# Patient Record
Sex: MALE | Race: WHITE | Employment: OTHER | URBAN - METROPOLITAN AREA
[De-identification: names, ages, dates, MRNs, and addresses within clinical notes are randomized per-mention and may not be internally consistent; named-entity substitution may affect disease eponyms.]

---

## 2017-01-05 ENCOUNTER — HOSPITAL ENCOUNTER (OUTPATIENT)
Dept: PHARMACY | Age: 82
Discharge: HOME | End: 2017-01-05
Payer: MEDICARE

## 2017-01-05 DIAGNOSIS — I48.91 ATRIAL FIBRILLATION, UNSPECIFIED TYPE (HCC): ICD-10-CM

## 2017-01-05 LAB
INR BLD: 2.1
PROTIME: 25 SECONDS

## 2017-01-05 PROCEDURE — G0463 HOSPITAL OUTPT CLINIC VISIT: HCPCS

## 2017-01-05 PROCEDURE — 85610 PROTHROMBIN TIME: CPT

## 2017-02-16 ENCOUNTER — HOSPITAL ENCOUNTER (OUTPATIENT)
Dept: PHARMACY | Age: 82
Discharge: HOME | End: 2017-02-16
Payer: MEDICARE

## 2017-02-16 DIAGNOSIS — I48.91 ATRIAL FIBRILLATION, UNSPECIFIED TYPE (HCC): ICD-10-CM

## 2017-02-16 LAB
INR BLD: 2.8
PROTIME: 33.3 SECONDS

## 2017-02-16 PROCEDURE — 85610 PROTHROMBIN TIME: CPT

## 2017-02-16 PROCEDURE — G0463 HOSPITAL OUTPT CLINIC VISIT: HCPCS

## 2017-03-02 LAB
INR BLD: 3.2
PROTIME: 38.8 SECONDS

## 2017-03-30 ENCOUNTER — HOSPITAL ENCOUNTER (OUTPATIENT)
Dept: PHARMACY | Age: 82
Discharge: HOME | End: 2017-03-30
Payer: MEDICARE

## 2017-03-30 DIAGNOSIS — I48.91 ATRIAL FIBRILLATION, UNSPECIFIED TYPE (HCC): ICD-10-CM

## 2017-03-30 LAB
INR BLD: 2.6
PROTIME: 31.2 SECONDS

## 2017-03-30 PROCEDURE — G0463 HOSPITAL OUTPT CLINIC VISIT: HCPCS | Performed by: PHARMACIST

## 2017-03-30 PROCEDURE — 85610 PROTHROMBIN TIME: CPT | Performed by: PHARMACIST

## 2017-04-05 RX ORDER — WARFARIN SODIUM 5 MG/1
TABLET ORAL
Qty: 140 TABLET | Refills: 1 | Status: SHIPPED | OUTPATIENT
Start: 2017-04-05 | End: 2017-11-29 | Stop reason: SDUPTHER

## 2017-05-11 ENCOUNTER — HOSPITAL ENCOUNTER (OUTPATIENT)
Dept: PHARMACY | Age: 82
Discharge: HOME | End: 2017-05-11
Payer: MEDICARE

## 2017-05-11 DIAGNOSIS — I48.91 ATRIAL FIBRILLATION, UNSPECIFIED TYPE (HCC): ICD-10-CM

## 2017-05-11 LAB
INR BLD: 3.2
PROTIME: 38.8 SECONDS

## 2017-05-11 PROCEDURE — 85610 PROTHROMBIN TIME: CPT | Performed by: PHARMACIST

## 2017-05-11 PROCEDURE — 99211 OFF/OP EST MAY X REQ PHY/QHP: CPT | Performed by: PHARMACIST

## 2017-06-08 ENCOUNTER — HOSPITAL ENCOUNTER (OUTPATIENT)
Dept: PHARMACY | Age: 82
Discharge: HOME | End: 2017-06-08
Payer: MEDICARE

## 2017-06-08 DIAGNOSIS — I48.91 ATRIAL FIBRILLATION, UNSPECIFIED TYPE (HCC): ICD-10-CM

## 2017-06-08 LAB
INR BLD: 2.2
PROTIME: 26.9 SECONDS

## 2017-06-08 PROCEDURE — 99211 OFF/OP EST MAY X REQ PHY/QHP: CPT | Performed by: PHARMACIST

## 2017-06-08 PROCEDURE — 85610 PROTHROMBIN TIME: CPT | Performed by: PHARMACIST

## 2017-07-13 ENCOUNTER — HOSPITAL ENCOUNTER (OUTPATIENT)
Dept: PHARMACY | Age: 82
Setting detail: THERAPIES SERIES
Discharge: HOME | End: 2017-07-13
Payer: MEDICARE

## 2017-07-13 DIAGNOSIS — I48.91 ATRIAL FIBRILLATION, UNSPECIFIED TYPE (HCC): ICD-10-CM

## 2017-07-13 LAB
INR BLD: 3
PROTIME: 35.9 SECONDS

## 2017-07-13 PROCEDURE — 99211 OFF/OP EST MAY X REQ PHY/QHP: CPT

## 2017-07-13 PROCEDURE — 85610 PROTHROMBIN TIME: CPT

## 2017-08-24 ENCOUNTER — HOSPITAL ENCOUNTER (OUTPATIENT)
Dept: PHARMACY | Age: 82
Setting detail: THERAPIES SERIES
Discharge: HOME | End: 2017-08-24
Payer: MEDICARE

## 2017-08-24 DIAGNOSIS — I48.91 ATRIAL FIBRILLATION, UNSPECIFIED TYPE (HCC): ICD-10-CM

## 2017-08-24 LAB
INR BLD: 3.9
PROTIME: 46.8 SECONDS

## 2017-08-24 PROCEDURE — 85610 PROTHROMBIN TIME: CPT

## 2017-08-24 PROCEDURE — 99211 OFF/OP EST MAY X REQ PHY/QHP: CPT

## 2017-08-24 RX ORDER — PRAVASTATIN SODIUM 10 MG
10 TABLET ORAL DAILY
COMMUNITY

## 2017-09-22 ENCOUNTER — OFFICE VISIT (OUTPATIENT)
Dept: PULMONOLOGY | Age: 82
End: 2017-09-22

## 2017-09-22 VITALS
OXYGEN SATURATION: 96 % | WEIGHT: 192 LBS | HEIGHT: 70 IN | SYSTOLIC BLOOD PRESSURE: 120 MMHG | BODY MASS INDEX: 27.49 KG/M2 | TEMPERATURE: 96.4 F | DIASTOLIC BLOOD PRESSURE: 70 MMHG | HEART RATE: 62 BPM

## 2017-09-22 DIAGNOSIS — G47.33 OSA ON CPAP: ICD-10-CM

## 2017-09-22 DIAGNOSIS — Z99.89 OSA ON CPAP: ICD-10-CM

## 2017-09-22 PROBLEM — I10 HTN (HYPERTENSION): Status: ACTIVE | Noted: 2017-09-22

## 2017-09-22 PROCEDURE — G8419 CALC BMI OUT NRM PARAM NOF/U: HCPCS | Performed by: INTERNAL MEDICINE

## 2017-09-22 PROCEDURE — 1123F ACP DISCUSS/DSCN MKR DOCD: CPT | Performed by: INTERNAL MEDICINE

## 2017-09-22 PROCEDURE — 1036F TOBACCO NON-USER: CPT | Performed by: INTERNAL MEDICINE

## 2017-09-22 PROCEDURE — 99213 OFFICE O/P EST LOW 20 MIN: CPT | Performed by: INTERNAL MEDICINE

## 2017-09-22 PROCEDURE — 4040F PNEUMOC VAC/ADMIN/RCVD: CPT | Performed by: INTERNAL MEDICINE

## 2017-09-22 PROCEDURE — G8427 DOCREV CUR MEDS BY ELIG CLIN: HCPCS | Performed by: INTERNAL MEDICINE

## 2017-09-22 RX ORDER — VERAPAMIL HYDROCHLORIDE 120 MG/1
180 CAPSULE, EXTENDED RELEASE ORAL
COMMUNITY
Start: 2005-09-01

## 2017-09-22 RX ORDER — OLMESARTAN MEDOXOMIL AND HYDROCHLOROTHIAZIDE 20/12.5 20; 12.5 MG/1; MG/1
TABLET ORAL
COMMUNITY
Start: 2005-09-01 | End: 2021-08-31

## 2017-09-22 ASSESSMENT — ENCOUNTER SYMPTOMS
WHEEZING: 0
ABDOMINAL PAIN: 0
NAUSEA: 0
SHORTNESS OF BREATH: 0
VOMITING: 0
DIARRHEA: 0
RHINORRHEA: 0
EYE ITCHING: 0
VOICE CHANGE: 0
COUGH: 0
CHEST TIGHTNESS: 0
SORE THROAT: 0

## 2017-10-05 ENCOUNTER — HOSPITAL ENCOUNTER (OUTPATIENT)
Dept: PHARMACY | Age: 82
Setting detail: THERAPIES SERIES
Discharge: HOME | End: 2017-10-05
Payer: MEDICARE

## 2017-10-05 DIAGNOSIS — I48.91 ATRIAL FIBRILLATION, UNSPECIFIED TYPE (HCC): ICD-10-CM

## 2017-10-05 LAB
INR BLD: 3.4
PROTIME: 41 SECONDS

## 2017-10-05 PROCEDURE — 99211 OFF/OP EST MAY X REQ PHY/QHP: CPT | Performed by: PHARMACIST

## 2017-10-05 PROCEDURE — 85610 PROTHROMBIN TIME: CPT | Performed by: PHARMACIST

## 2017-11-16 ENCOUNTER — HOSPITAL ENCOUNTER (OUTPATIENT)
Dept: PHARMACY | Age: 82
Setting detail: THERAPIES SERIES
Discharge: HOME | End: 2017-11-16
Payer: MEDICARE

## 2017-11-16 DIAGNOSIS — I48.91 ATRIAL FIBRILLATION, UNSPECIFIED TYPE (HCC): ICD-10-CM

## 2017-11-16 LAB
INR BLD: 3.1
PROTIME: 37.4 SECONDS

## 2017-11-16 PROCEDURE — 85610 PROTHROMBIN TIME: CPT

## 2017-11-16 PROCEDURE — 99211 OFF/OP EST MAY X REQ PHY/QHP: CPT

## 2017-11-16 NOTE — PROGRESS NOTES
Mr. Kandace Ventura is a [de-identified] y.o. y/o male with history of Afib who presents today for anticoagulation monitoring and adjustment.   INR 3.1 is supra-therapeutic for this patient (goal range 2-3) and is reflective of 42.5 mg TWD  Patient verifies current dosing regimen, patient able to verbally recall dose  Patient reports 0  missed doses since last INR   Patient denies s/sx clotting and/or stroke  Patient denies hematuria, epistaxis, rectal bleeding  Patient denies changes in diet, alcohol, or tobacco use  Reviewed medication list and drug allergies with patient, updated any medication additions or modifications accordingly  Patient also denies any pending medical or dental procedures scheduled at this time  This was the third result supra-therapeutic (previous readings 3.4, 3.9)  Patient was instructed to decrease (5.9%) to 40 mg  and RTC 5 weeks

## 2017-11-29 RX ORDER — WARFARIN SODIUM 5 MG/1
TABLET ORAL
Qty: 130 TABLET | Refills: 1 | Status: SHIPPED | OUTPATIENT
Start: 2017-11-29 | End: 2018-02-15 | Stop reason: SDUPTHER

## 2017-12-21 ENCOUNTER — HOSPITAL ENCOUNTER (OUTPATIENT)
Dept: PHARMACY | Age: 82
Setting detail: THERAPIES SERIES
Discharge: HOME | End: 2017-12-21
Payer: MEDICARE

## 2017-12-21 DIAGNOSIS — I48.91 ATRIAL FIBRILLATION, UNSPECIFIED TYPE (HCC): ICD-10-CM

## 2017-12-21 LAB
INR BLD: 3.9
PROTIME: 46.9 SECONDS

## 2017-12-21 PROCEDURE — 99211 OFF/OP EST MAY X REQ PHY/QHP: CPT | Performed by: PHARMACIST

## 2017-12-21 PROCEDURE — 85610 PROTHROMBIN TIME: CPT | Performed by: PHARMACIST

## 2017-12-21 RX ORDER — TAMSULOSIN HYDROCHLORIDE 0.4 MG/1
0.4 CAPSULE ORAL DAILY
COMMUNITY

## 2018-01-04 ENCOUNTER — HOSPITAL ENCOUNTER (OUTPATIENT)
Dept: PHARMACY | Age: 83
Setting detail: THERAPIES SERIES
Discharge: HOME | End: 2018-01-04
Payer: MEDICARE

## 2018-01-04 DIAGNOSIS — I48.91 ATRIAL FIBRILLATION, UNSPECIFIED TYPE (HCC): ICD-10-CM

## 2018-01-04 LAB
INR BLD: 2.6
PROTIME: 31 SECONDS

## 2018-01-04 PROCEDURE — 99211 OFF/OP EST MAY X REQ PHY/QHP: CPT

## 2018-01-04 PROCEDURE — 85610 PROTHROMBIN TIME: CPT

## 2018-02-01 ENCOUNTER — HOSPITAL ENCOUNTER (OUTPATIENT)
Dept: PHARMACY | Age: 83
Setting detail: THERAPIES SERIES
Discharge: HOME | End: 2018-02-01
Payer: MEDICARE

## 2018-02-01 DIAGNOSIS — I48.91 ATRIAL FIBRILLATION, UNSPECIFIED TYPE (HCC): ICD-10-CM

## 2018-02-01 LAB
INR BLD: 2.7
PROTIME: 32 SECONDS

## 2018-02-01 PROCEDURE — 85610 PROTHROMBIN TIME: CPT | Performed by: PHARMACIST

## 2018-02-01 PROCEDURE — 99211 OFF/OP EST MAY X REQ PHY/QHP: CPT | Performed by: PHARMACIST

## 2018-02-15 RX ORDER — WARFARIN SODIUM 5 MG/1
TABLET ORAL
Qty: 130 TABLET | Refills: 1 | Status: SHIPPED | OUTPATIENT
Start: 2018-02-15 | End: 2018-08-15 | Stop reason: SDUPTHER

## 2018-03-13 ENCOUNTER — HOSPITAL ENCOUNTER (OUTPATIENT)
Dept: GENERAL RADIOLOGY | Age: 83
Discharge: HOME | End: 2018-03-15
Payer: MEDICARE

## 2018-03-13 DIAGNOSIS — R10.9 ABDOMINAL PAIN, UNSPECIFIED ABDOMINAL LOCATION: ICD-10-CM

## 2018-03-13 PROCEDURE — 74018 RADEX ABDOMEN 1 VIEW: CPT

## 2018-03-15 ENCOUNTER — HOSPITAL ENCOUNTER (OUTPATIENT)
Dept: PHARMACY | Age: 83
Setting detail: THERAPIES SERIES
Discharge: HOME | End: 2018-03-15
Payer: MEDICARE

## 2018-03-15 DIAGNOSIS — I48.91 ATRIAL FIBRILLATION, UNSPECIFIED TYPE (HCC): ICD-10-CM

## 2018-03-15 LAB
INR BLD: 2
PROTIME: 23.9 SECONDS

## 2018-03-15 PROCEDURE — 85610 PROTHROMBIN TIME: CPT

## 2018-03-15 PROCEDURE — 99211 OFF/OP EST MAY X REQ PHY/QHP: CPT

## 2018-03-15 NOTE — PROGRESS NOTES
Mr. Angelia Leyden is a [de-identified] y.o. y/o male with history of Afib who presents today for anticoagulation monitoring and adjustment.   INR 2.0 is therapeutic for this patient (goal range 2-3) and is reflective of 35 mg TWD  Patient verifies current dosing regimen, patient able to verbally recall dose  Patient reports 0 missed doses since last INR   Patient denies s/sx clotting and/or stroke  Patient denies hematuria, epistaxis, rectal bleeding  Patient denies changes in diet, alcohol, or tobacco use  Reviewed medication list and drug allergies with patient, updated any medication additions or modifications accordingly  Patient also denies any pending medical or dental procedures scheduled at this time  Patient was instructed to continue 35 mg TWD and RTC 6 weeks    Rinku Castano PharmD   3/15/2018 8:41 AM

## 2018-03-27 ENCOUNTER — OFFICE VISIT (OUTPATIENT)
Dept: PULMONOLOGY | Age: 83
End: 2018-03-27
Payer: MEDICARE

## 2018-03-27 VITALS
WEIGHT: 191.2 LBS | BODY MASS INDEX: 28.32 KG/M2 | OXYGEN SATURATION: 96 % | HEART RATE: 97 BPM | SYSTOLIC BLOOD PRESSURE: 128 MMHG | HEIGHT: 69 IN | TEMPERATURE: 97.6 F | DIASTOLIC BLOOD PRESSURE: 70 MMHG

## 2018-03-27 DIAGNOSIS — I48.20 CHRONIC ATRIAL FIBRILLATION (HCC): ICD-10-CM

## 2018-03-27 DIAGNOSIS — G47.33 OSA ON CPAP: Primary | ICD-10-CM

## 2018-03-27 DIAGNOSIS — E66.3 OVERWEIGHT (BMI 25.0-29.9): ICD-10-CM

## 2018-03-27 DIAGNOSIS — Z99.89 OSA ON CPAP: Primary | ICD-10-CM

## 2018-03-27 PROCEDURE — 4040F PNEUMOC VAC/ADMIN/RCVD: CPT | Performed by: INTERNAL MEDICINE

## 2018-03-27 PROCEDURE — 99214 OFFICE O/P EST MOD 30 MIN: CPT | Performed by: INTERNAL MEDICINE

## 2018-03-27 PROCEDURE — 1123F ACP DISCUSS/DSCN MKR DOCD: CPT | Performed by: INTERNAL MEDICINE

## 2018-03-27 PROCEDURE — G8419 CALC BMI OUT NRM PARAM NOF/U: HCPCS | Performed by: INTERNAL MEDICINE

## 2018-03-27 PROCEDURE — 1036F TOBACCO NON-USER: CPT | Performed by: INTERNAL MEDICINE

## 2018-03-27 PROCEDURE — G8484 FLU IMMUNIZE NO ADMIN: HCPCS | Performed by: INTERNAL MEDICINE

## 2018-03-27 PROCEDURE — G8427 DOCREV CUR MEDS BY ELIG CLIN: HCPCS | Performed by: INTERNAL MEDICINE

## 2018-03-27 ASSESSMENT — ENCOUNTER SYMPTOMS
ABDOMINAL PAIN: 0
SORE THROAT: 0
DIARRHEA: 0
VOICE CHANGE: 0
EYE ITCHING: 0
SHORTNESS OF BREATH: 0
CHEST TIGHTNESS: 0
NAUSEA: 0
VOMITING: 0
RHINORRHEA: 0
WHEEZING: 0
COUGH: 0

## 2018-03-27 NOTE — PROGRESS NOTES
for chest pain, palpitations and leg swelling. Gastrointestinal: Negative for abdominal pain, diarrhea, nausea and vomiting. Genitourinary: Negative for difficulty urinating and hematuria. Musculoskeletal: Negative for arthralgias, joint swelling and myalgias. Skin: Negative for rash. Allergic/Immunologic: Negative for environmental allergies. Neurological: Negative for dizziness, tremors, weakness and headaches. Psychiatric/Behavioral: Negative for behavioral problems and sleep disturbance. Objective:     Vitals:    03/27/18 0858   BP: 128/70   Site: Left Arm   Pulse: 97   Temp: 97.6 °F (36.4 °C)   TempSrc: Tympanic   SpO2: 96%   Weight: 191 lb 3.2 oz (86.7 kg)   Height: 5' 9\" (1.753 m)         Physical Exam   Constitutional: He is oriented to person, place, and time. He appears well-developed and well-nourished. HENT:   Head: Normocephalic and atraumatic. Nose: Nose normal.   Mouth/Throat: Oropharynx is clear and moist.   Eyes: Conjunctivae and EOM are normal. Pupils are equal, round, and reactive to light. Neck: No JVD present. No tracheal deviation present. No thyromegaly present. Cardiovascular: Normal rate and regular rhythm. Exam reveals no gallop and no friction rub. No murmur heard. Pulmonary/Chest: Effort normal and breath sounds normal. No respiratory distress. He has no wheezes. He has no rales. He exhibits no tenderness. Abdominal: He exhibits no distension. Musculoskeletal: Normal range of motion. Lymphadenopathy:     He has no cervical adenopathy. Neurological: He is alert and oriented to person, place, and time. No cranial nerve deficit. Skin: Skin is warm and dry. No rash noted. Psychiatric: He has a normal mood and affect. His behavior is normal.       Current Outpatient Prescriptions   Medication Sig Dispense Refill    warfarin (COUMADIN) 5 MG tablet Take as directed by [facility] [place] [facility] [place] [facility]. Quantity equals 90 day supply.

## 2018-04-26 ENCOUNTER — HOSPITAL ENCOUNTER (OUTPATIENT)
Dept: PHARMACY | Age: 83
Setting detail: THERAPIES SERIES
Discharge: HOME | End: 2018-04-26
Payer: MEDICARE

## 2018-04-26 DIAGNOSIS — I48.91 ATRIAL FIBRILLATION, UNSPECIFIED TYPE (HCC): ICD-10-CM

## 2018-04-26 LAB
INR BLD: 2.2
PROTIME: 26.2 SECONDS

## 2018-04-26 PROCEDURE — 85610 PROTHROMBIN TIME: CPT

## 2018-04-26 PROCEDURE — 99211 OFF/OP EST MAY X REQ PHY/QHP: CPT

## 2018-06-07 ENCOUNTER — HOSPITAL ENCOUNTER (OUTPATIENT)
Dept: PHARMACY | Age: 83
Setting detail: THERAPIES SERIES
Discharge: HOME | End: 2018-06-07
Payer: MEDICARE

## 2018-06-07 DIAGNOSIS — I48.91 ATRIAL FIBRILLATION, UNSPECIFIED TYPE (HCC): ICD-10-CM

## 2018-06-07 LAB
INR BLD: 2.3
PROTIME: 27.1 SECONDS

## 2018-06-07 PROCEDURE — 85610 PROTHROMBIN TIME: CPT | Performed by: PHARMACIST

## 2018-06-07 PROCEDURE — 99211 OFF/OP EST MAY X REQ PHY/QHP: CPT | Performed by: PHARMACIST

## 2018-07-19 ENCOUNTER — HOSPITAL ENCOUNTER (OUTPATIENT)
Dept: PHARMACY | Age: 83
Setting detail: THERAPIES SERIES
Discharge: HOME | End: 2018-07-19
Payer: MEDICARE

## 2018-07-19 DIAGNOSIS — I48.91 ATRIAL FIBRILLATION, UNSPECIFIED TYPE (HCC): ICD-10-CM

## 2018-07-19 LAB
INR BLD: 2.6
PROTIME: 31 SECONDS

## 2018-07-19 PROCEDURE — 99211 OFF/OP EST MAY X REQ PHY/QHP: CPT

## 2018-07-19 PROCEDURE — 85610 PROTHROMBIN TIME: CPT

## 2018-08-15 RX ORDER — WARFARIN SODIUM 5 MG/1
TABLET ORAL
Qty: 100 TABLET | Refills: 1 | Status: SHIPPED | OUTPATIENT
Start: 2018-08-15 | End: 2019-05-14 | Stop reason: SDUPTHER

## 2018-08-30 ENCOUNTER — HOSPITAL ENCOUNTER (OUTPATIENT)
Dept: PHARMACY | Age: 83
Setting detail: THERAPIES SERIES
Discharge: HOME | End: 2018-08-30
Payer: MEDICARE

## 2018-08-30 DIAGNOSIS — I48.91 ATRIAL FIBRILLATION, UNSPECIFIED TYPE (HCC): ICD-10-CM

## 2018-08-30 LAB
INR BLD: 2.3
PROTIME: 27.8 SECONDS

## 2018-08-30 PROCEDURE — 85610 PROTHROMBIN TIME: CPT

## 2018-08-30 PROCEDURE — 99211 OFF/OP EST MAY X REQ PHY/QHP: CPT

## 2018-08-30 NOTE — PROGRESS NOTES
Mr. Sara Horta is a 82 y.o. y/o male with history of Afib who presents today for anticoagulation monitoring and adjustment.   INR 2.3 is therapeutic for this patient (goal range 2-3) and is reflective of 35 mg TWD  Patient verifies current dosing regimen, patient able to verbally recall dose  Patient reports 0  missed doses since last INR   Patient denies s/sx clotting and/or stroke  Patient denies hematuria, epistaxis, rectal bleeding  Patient denies changes in diet, alcohol, or tobacco use  Reviewed medication list and drug allergies with patient, updated any medication additions or modifications accordingly  Patient also denies any pending medical or dental procedures scheduled at this time  Patient was instructed to resume current regimen of 35 mg TWD and RTC 6 weeks      Sophy Angelo, PharmD  Staff Pharmacist  8/30/2018 8:49 AM

## 2018-09-25 ENCOUNTER — OFFICE VISIT (OUTPATIENT)
Dept: PULMONOLOGY | Age: 83
End: 2018-09-25
Payer: MEDICARE

## 2018-09-25 VITALS
BODY MASS INDEX: 27.63 KG/M2 | OXYGEN SATURATION: 97 % | TEMPERATURE: 98.6 F | HEIGHT: 70 IN | WEIGHT: 193 LBS | SYSTOLIC BLOOD PRESSURE: 120 MMHG | RESPIRATION RATE: 16 BRPM | HEART RATE: 76 BPM | DIASTOLIC BLOOD PRESSURE: 62 MMHG

## 2018-09-25 DIAGNOSIS — Z99.89 OSA ON CPAP: Primary | ICD-10-CM

## 2018-09-25 DIAGNOSIS — I48.91 ATRIAL FIBRILLATION, UNSPECIFIED TYPE (HCC): ICD-10-CM

## 2018-09-25 DIAGNOSIS — G47.33 OSA ON CPAP: Primary | ICD-10-CM

## 2018-09-25 DIAGNOSIS — E66.3 OVERWEIGHT (BMI 25.0-29.9): ICD-10-CM

## 2018-09-25 PROBLEM — H26.492 POSTERIOR CAPSULAR OPACIFICATION VISUALLY SIGNIFICANT OF LEFT EYE: Status: ACTIVE | Noted: 2018-01-10

## 2018-09-25 PROCEDURE — 1123F ACP DISCUSS/DSCN MKR DOCD: CPT | Performed by: INTERNAL MEDICINE

## 2018-09-25 PROCEDURE — 4040F PNEUMOC VAC/ADMIN/RCVD: CPT | Performed by: INTERNAL MEDICINE

## 2018-09-25 PROCEDURE — G8419 CALC BMI OUT NRM PARAM NOF/U: HCPCS | Performed by: INTERNAL MEDICINE

## 2018-09-25 PROCEDURE — 1036F TOBACCO NON-USER: CPT | Performed by: INTERNAL MEDICINE

## 2018-09-25 PROCEDURE — G8427 DOCREV CUR MEDS BY ELIG CLIN: HCPCS | Performed by: INTERNAL MEDICINE

## 2018-09-25 PROCEDURE — 1101F PT FALLS ASSESS-DOCD LE1/YR: CPT | Performed by: INTERNAL MEDICINE

## 2018-09-25 PROCEDURE — 99213 OFFICE O/P EST LOW 20 MIN: CPT | Performed by: INTERNAL MEDICINE

## 2018-09-25 RX ORDER — ASPIRIN 81 MG/1
81 TABLET, CHEWABLE ORAL DAILY
COMMUNITY

## 2018-09-25 ASSESSMENT — ENCOUNTER SYMPTOMS
CHEST TIGHTNESS: 0
VOICE CHANGE: 0
SHORTNESS OF BREATH: 0
COUGH: 0
SORE THROAT: 0
DIARRHEA: 0
ABDOMINAL PAIN: 0
VOMITING: 0
EYE ITCHING: 0
NAUSEA: 0
RHINORRHEA: 0
WHEEZING: 0

## 2018-09-25 NOTE — PROGRESS NOTES
chewable tablet Take 81 mg by mouth daily      warfarin (COUMADIN) 5 MG tablet Take as directed by [facility] EMERGENCY [facility] AT [place] [facility]. Quantity equals 90 day supply. 100 tablet 1    tamsulosin (FLOMAX) 0.4 MG capsule Take 0.4 mg by mouth daily      olmesartan-hydrochlorothiazide (BENICAR HCT) 20-12.5 MG per tablet Take by mouth      verapamil (VERELAN) 120 MG extended release capsule Take by mouth      CPAP Machine MISC by Does not apply route Indications: 7 cm with sleep 6-8 hours      pravastatin (PRAVACHOL) 10 MG tablet Take 10 mg by mouth daily       No current facility-administered medications for this visit. Assessment/Plan:     1. OSVALDO on CPAP  Patient is using CPAP with  7  centimeters of H2O with heated humidity. Patient is using CPAP for about 7  hours every night. Patient is using CPAP with  Dream Wear Nasal Mask. He said mask is moving and leaking. He said he will call [facility] to change mask . CPAP supply is requested. Counseling: CPAP/BiPAP uses, patient advised to use CPAP at least 5-6 hours every night. Driving: patient is advised for extreme caution when driving or operating machinery if there is a feeling of drowsiness, especially while driving it is preferable to stop driving and take a brief nap. Sleep hygiene:Avoid supine sleep, sleep on  sides. Avoid  sleep deprivation. Explained sleep hygiene. Advice to avoid Alcohol and sedative    2. Overweight (BMI 25.0-29. 9)  Patient patient is advised try to lose weight. obesity related risk explained to the patient ,  Current weight:  193 lb (87.5 kg) Lbs. BMI:  Body mass index is 27.69 kg/m². 3. Atrial fibrillation, unspecified type [facility])  Following with dr. Rene Olsen, he is on coumadin therapy. Return in about 6 months (around 3/25/2019) for osvaldo.       Sujata Greene MD

## 2018-10-11 ENCOUNTER — HOSPITAL ENCOUNTER (OUTPATIENT)
Dept: PHARMACY | Age: 83
Setting detail: THERAPIES SERIES
Discharge: HOME | End: 2018-10-11
Payer: MEDICARE

## 2018-10-11 DIAGNOSIS — I48.91 ATRIAL FIBRILLATION, UNSPECIFIED TYPE (HCC): ICD-10-CM

## 2018-10-11 LAB
INR BLD: 3.7
PROTIME: 44.3 SECONDS

## 2018-10-11 PROCEDURE — 85610 PROTHROMBIN TIME: CPT

## 2018-10-11 PROCEDURE — 99211 OFF/OP EST MAY X REQ PHY/QHP: CPT

## 2018-11-01 ENCOUNTER — HOSPITAL ENCOUNTER (OUTPATIENT)
Dept: PHARMACY | Age: 83
Setting detail: THERAPIES SERIES
Discharge: HOME | End: 2018-11-01
Payer: MEDICARE

## 2018-11-01 DIAGNOSIS — I48.91 ATRIAL FIBRILLATION, UNSPECIFIED TYPE (HCC): ICD-10-CM

## 2018-11-01 PROCEDURE — 85610 PROTHROMBIN TIME: CPT

## 2018-11-01 PROCEDURE — 99211 OFF/OP EST MAY X REQ PHY/QHP: CPT

## 2018-11-01 NOTE — PROGRESS NOTES
Mr. Santy Cisneros is a 82 y.o. y/o male with history of Afib who presents today for anticoagulation monitoring and adjustment.   INR 2.8 is therapeutic for this patient (goal range 2-3) and is reflective of 35 mg TWD  Patient verifies current dosing regimen, patient able to verbally recall dose  Patient reports 0  missed doses since last INR   Patient denies s/sx clotting and/or stroke  Patient denies hematuria, epistaxis, rectal bleeding  Patient denies changes in diet, alcohol, or tobacco use  Reviewed medication list and drug allergies with patient, updated any medication additions or modifications accordingly  Patient also denies any pending medical or dental procedures scheduled at this time  Patient was instructed to continue 35 mg TWd and RTC 6 weeks

## 2018-12-13 ENCOUNTER — HOSPITAL ENCOUNTER (OUTPATIENT)
Dept: PHARMACY | Age: 83
Setting detail: THERAPIES SERIES
Discharge: HOME | End: 2018-12-13
Payer: MEDICARE

## 2018-12-13 DIAGNOSIS — I48.91 ATRIAL FIBRILLATION, UNSPECIFIED TYPE (HCC): ICD-10-CM

## 2018-12-13 LAB — INTERNATIONAL NORMALIZATION RATIO, POC: 2.2

## 2018-12-13 PROCEDURE — 99211 OFF/OP EST MAY X REQ PHY/QHP: CPT | Performed by: PHARMACIST

## 2018-12-13 PROCEDURE — 85610 PROTHROMBIN TIME: CPT | Performed by: PHARMACIST

## 2018-12-13 NOTE — PROGRESS NOTES
Mr. Arleen Marr is a 82 y.o. y/o male with history of Afib who presents today for anticoagulation monitoring and adjustment.   INR 2.2 is therapeutic for this patient (goal range 2-3) and is reflective of 35 mg TWD  Patient verifies current dosing regimen, patient able to verbally recall dose  Patient reports no  missed doses since last INR   Patient denies s/sx clotting and/or stroke  Patient denies hematuria, epistaxis, rectal bleeding  Patient denies changes in diet, alcohol, or tobacco use  Reviewed medication list and drug allergies with patient, updated any medication additions or modifications accordingly  Patient also denies any pending medical or dental procedures scheduled at this time  Patient was instructed to continue 35mg TWD and RTC 6 weeks

## 2019-01-24 ENCOUNTER — HOSPITAL ENCOUNTER (OUTPATIENT)
Dept: PHARMACY | Age: 84
Setting detail: THERAPIES SERIES
Discharge: HOME | End: 2019-01-24
Payer: MEDICARE

## 2019-01-24 DIAGNOSIS — I48.91 ATRIAL FIBRILLATION, UNSPECIFIED TYPE (HCC): ICD-10-CM

## 2019-01-24 LAB — INTERNATIONAL NORMALIZATION RATIO, POC: 2.1

## 2019-01-24 PROCEDURE — 99211 OFF/OP EST MAY X REQ PHY/QHP: CPT | Performed by: PHARMACIST

## 2019-01-24 PROCEDURE — 85610 PROTHROMBIN TIME: CPT | Performed by: PHARMACIST

## 2019-03-07 ENCOUNTER — HOSPITAL ENCOUNTER (OUTPATIENT)
Dept: PHARMACY | Age: 84
Setting detail: THERAPIES SERIES
Discharge: HOME | End: 2019-03-07
Payer: MEDICARE

## 2019-03-07 DIAGNOSIS — I48.91 ATRIAL FIBRILLATION, UNSPECIFIED TYPE (HCC): ICD-10-CM

## 2019-03-07 PROCEDURE — 85610 PROTHROMBIN TIME: CPT

## 2019-03-07 PROCEDURE — 99211 OFF/OP EST MAY X REQ PHY/QHP: CPT

## 2019-03-26 ENCOUNTER — OFFICE VISIT (OUTPATIENT)
Dept: PULMONOLOGY | Age: 84
End: 2019-03-26
Payer: MEDICARE

## 2019-03-26 VITALS
TEMPERATURE: 97.6 F | HEART RATE: 75 BPM | RESPIRATION RATE: 16 BRPM | WEIGHT: 190 LBS | HEIGHT: 69 IN | BODY MASS INDEX: 28.14 KG/M2 | DIASTOLIC BLOOD PRESSURE: 64 MMHG | OXYGEN SATURATION: 97 % | SYSTOLIC BLOOD PRESSURE: 138 MMHG

## 2019-03-26 DIAGNOSIS — Z99.89 OSA ON CPAP: Primary | ICD-10-CM

## 2019-03-26 DIAGNOSIS — E66.3 OVERWEIGHT (BMI 25.0-29.9): ICD-10-CM

## 2019-03-26 DIAGNOSIS — G47.33 OSA ON CPAP: Primary | ICD-10-CM

## 2019-03-26 DIAGNOSIS — I48.91 ATRIAL FIBRILLATION, UNSPECIFIED TYPE (HCC): ICD-10-CM

## 2019-03-26 PROCEDURE — G8427 DOCREV CUR MEDS BY ELIG CLIN: HCPCS | Performed by: INTERNAL MEDICINE

## 2019-03-26 PROCEDURE — 1036F TOBACCO NON-USER: CPT | Performed by: INTERNAL MEDICINE

## 2019-03-26 PROCEDURE — 1123F ACP DISCUSS/DSCN MKR DOCD: CPT | Performed by: INTERNAL MEDICINE

## 2019-03-26 PROCEDURE — 4040F PNEUMOC VAC/ADMIN/RCVD: CPT | Performed by: INTERNAL MEDICINE

## 2019-03-26 PROCEDURE — G8484 FLU IMMUNIZE NO ADMIN: HCPCS | Performed by: INTERNAL MEDICINE

## 2019-03-26 PROCEDURE — 99214 OFFICE O/P EST MOD 30 MIN: CPT | Performed by: INTERNAL MEDICINE

## 2019-03-26 PROCEDURE — 1101F PT FALLS ASSESS-DOCD LE1/YR: CPT | Performed by: INTERNAL MEDICINE

## 2019-03-26 PROCEDURE — G8419 CALC BMI OUT NRM PARAM NOF/U: HCPCS | Performed by: INTERNAL MEDICINE

## 2019-03-26 ASSESSMENT — ENCOUNTER SYMPTOMS
COUGH: 0
SORE THROAT: 0
SHORTNESS OF BREATH: 0
CHEST TIGHTNESS: 0
VOMITING: 0
WHEEZING: 0
VOICE CHANGE: 0
DIARRHEA: 0
NAUSEA: 0
RHINORRHEA: 0
EYE ITCHING: 0
ABDOMINAL PAIN: 0

## 2019-04-18 ENCOUNTER — HOSPITAL ENCOUNTER (OUTPATIENT)
Dept: PHARMACY | Age: 84
Setting detail: THERAPIES SERIES
Discharge: HOME | End: 2019-04-18
Payer: MEDICARE

## 2019-04-18 DIAGNOSIS — I48.91 ATRIAL FIBRILLATION, UNSPECIFIED TYPE (HCC): ICD-10-CM

## 2019-04-18 LAB — INTERNATIONAL NORMALIZATION RATIO, POC: 2.8

## 2019-04-18 PROCEDURE — 99211 OFF/OP EST MAY X REQ PHY/QHP: CPT

## 2019-04-18 PROCEDURE — 85610 PROTHROMBIN TIME: CPT

## 2019-04-30 LAB
ANION GAP SERPL CALCULATED.3IONS-SCNC: 11 MMOL/L (ref 10–20)
BICARBONATE: 31 MMOL/L (ref 21–32)
CHLORIDE BLD-SCNC: 104 MMOL/L (ref 98–107)
CHOLESTEROL/HDL RATIO: 2.8
CHOLESTEROL: 128 MG/DL (ref 0–199)
CREAT SERPL-MCNC: 1.01 MG/DL (ref 0.5–1.3)
DIGOXIN LEVEL: 0.9 NG/ML (ref 0.8–2)
GFR AFRICAN AMERICAN: >60 ML/MIN/1.73M2
GFR NON-AFRICAN AMERICAN: >60 ML/MIN/1.73M2
HDLC SERPL-MCNC: 46 MG/DL
LDL CHOLESTEROL: 68 MG/DL (ref 0–99)
POTASSIUM SERPL-SCNC: 3.8 MMOL/L (ref 3.5–5.3)
SODIUM BLD-SCNC: 142 MMOL/L (ref 136–145)
TRIGL SERPL-MCNC: 72 MG/DL (ref 0–149)
UREA NITROGEN: 16 MG/DL (ref 6–23)
VLDLC SERPL CALC-MCNC: 14 MG/DL (ref 0–40)

## 2019-05-14 RX ORDER — WARFARIN SODIUM 5 MG/1
TABLET ORAL
Qty: 100 TABLET | Refills: 1 | Status: SHIPPED | OUTPATIENT
Start: 2019-05-14 | End: 2019-11-20 | Stop reason: SDUPTHER

## 2019-05-30 ENCOUNTER — HOSPITAL ENCOUNTER (OUTPATIENT)
Dept: PHARMACY | Age: 84
Setting detail: THERAPIES SERIES
Discharge: HOME | End: 2019-05-30
Payer: MEDICARE

## 2019-05-30 LAB — INTERNATIONAL NORMALIZATION RATIO, POC: 2

## 2019-05-30 PROCEDURE — 85610 PROTHROMBIN TIME: CPT

## 2019-05-30 PROCEDURE — 99211 OFF/OP EST MAY X REQ PHY/QHP: CPT

## 2019-07-11 ENCOUNTER — HOSPITAL ENCOUNTER (OUTPATIENT)
Dept: PHARMACY | Age: 84
Setting detail: THERAPIES SERIES
Discharge: HOME | End: 2019-07-11
Payer: MEDICARE

## 2019-07-11 DIAGNOSIS — I48.91 ATRIAL FIBRILLATION, UNSPECIFIED TYPE (HCC): ICD-10-CM

## 2019-07-11 LAB — INTERNATIONAL NORMALIZATION RATIO, POC: 2.8

## 2019-07-11 PROCEDURE — 85610 PROTHROMBIN TIME: CPT

## 2019-07-11 PROCEDURE — 99211 OFF/OP EST MAY X REQ PHY/QHP: CPT

## 2019-08-22 ENCOUNTER — HOSPITAL ENCOUNTER (OUTPATIENT)
Dept: PHARMACY | Age: 84
Setting detail: THERAPIES SERIES
Discharge: HOME | End: 2019-08-22
Payer: MEDICARE

## 2019-08-22 DIAGNOSIS — I48.91 ATRIAL FIBRILLATION, UNSPECIFIED TYPE (HCC): ICD-10-CM

## 2019-08-22 LAB — INTERNATIONAL NORMALIZATION RATIO, POC: 2.1

## 2019-08-22 PROCEDURE — 85610 PROTHROMBIN TIME: CPT | Performed by: PHARMACIST

## 2019-08-22 PROCEDURE — 99211 OFF/OP EST MAY X REQ PHY/QHP: CPT | Performed by: PHARMACIST

## 2019-08-22 NOTE — PROGRESS NOTES
Mr. Marco Antonio Machado is a 82 y.o. y/o male with history of Afib who presents today for anticoagulation monitoring and adjustment.   INR 2.1 is therapeutic for this patient (goal range 2-3) and is reflective of 35 mg TWD  Patient verifies current dosing regimen, patient able to verbally recall dose  Patient reports no missed doses since last INR   Patient denies s/sx clotting and/or stroke  Patient denies hematuria, epistaxis, rectal bleeding  Patient denies changes in diet, alcohol, or tobacco use  Reviewed medication list and drug allergies with patient, updated any medication additions or modifications accordingly  Patient also denies any pending medical or dental procedures scheduled at this time  Patient was instructed to continue 35mg TWD and RTC 6 weeks

## 2019-09-27 ENCOUNTER — OFFICE VISIT (OUTPATIENT)
Dept: PULMONOLOGY | Age: 84
End: 2019-09-27
Payer: MEDICARE

## 2019-09-27 VITALS
SYSTOLIC BLOOD PRESSURE: 120 MMHG | DIASTOLIC BLOOD PRESSURE: 60 MMHG | HEIGHT: 69 IN | BODY MASS INDEX: 28.14 KG/M2 | HEART RATE: 75 BPM | WEIGHT: 190 LBS | TEMPERATURE: 97.1 F | RESPIRATION RATE: 16 BRPM | OXYGEN SATURATION: 96 %

## 2019-09-27 DIAGNOSIS — E66.3 OVERWEIGHT (BMI 25.0-29.9): ICD-10-CM

## 2019-09-27 DIAGNOSIS — G47.33 OSA ON CPAP: Primary | ICD-10-CM

## 2019-09-27 DIAGNOSIS — Z99.89 OSA ON CPAP: Primary | ICD-10-CM

## 2019-09-27 PROCEDURE — 1123F ACP DISCUSS/DSCN MKR DOCD: CPT | Performed by: INTERNAL MEDICINE

## 2019-09-27 PROCEDURE — G8419 CALC BMI OUT NRM PARAM NOF/U: HCPCS | Performed by: INTERNAL MEDICINE

## 2019-09-27 PROCEDURE — 4040F PNEUMOC VAC/ADMIN/RCVD: CPT | Performed by: INTERNAL MEDICINE

## 2019-09-27 PROCEDURE — 1036F TOBACCO NON-USER: CPT | Performed by: INTERNAL MEDICINE

## 2019-09-27 PROCEDURE — G8427 DOCREV CUR MEDS BY ELIG CLIN: HCPCS | Performed by: INTERNAL MEDICINE

## 2019-09-27 PROCEDURE — 99213 OFFICE O/P EST LOW 20 MIN: CPT | Performed by: INTERNAL MEDICINE

## 2019-09-27 ASSESSMENT — ENCOUNTER SYMPTOMS
DIARRHEA: 0
WHEEZING: 0
RHINORRHEA: 0
EYE ITCHING: 0
CHEST TIGHTNESS: 0
VOICE CHANGE: 0
SORE THROAT: 0
VOMITING: 0
NAUSEA: 0
ABDOMINAL PAIN: 0
SHORTNESS OF BREATH: 0
COUGH: 0

## 2019-09-27 NOTE — PROGRESS NOTES
club or organization: Not on file     Attends meetings of clubs or organizations: Not on file     Relationship status: Not on file    Intimate partner violence:     Fear of current or ex partner: Not on file     Emotionally abused: Not on file     Physically abused: Not on file     Forced sexual activity: Not on file   Other Topics Concern    Not on file   Social History Narrative    Not on file         Review of Systems   Constitutional: Negative for chills, diaphoresis, fatigue and fever. HENT: Negative for congestion, mouth sores, nosebleeds, postnasal drip, rhinorrhea, sneezing, sore throat and voice change. Eyes: Negative for itching and visual disturbance. Respiratory: Negative for cough, chest tightness, shortness of breath and wheezing. Cardiovascular: Negative. Negative for chest pain, palpitations and leg swelling. Gastrointestinal: Negative for abdominal pain, diarrhea, nausea and vomiting. Genitourinary: Negative for difficulty urinating and hematuria. Musculoskeletal: Negative for arthralgias, joint swelling and myalgias. Skin: Negative for rash. Allergic/Immunologic: Negative for environmental allergies. Neurological: Negative for dizziness, tremors, weakness and headaches. Psychiatric/Behavioral: Positive for sleep disturbance. Negative for behavioral problems. Objective:     Vitals:    09/27/19 0907   BP: 120/60   Pulse: 75   Resp: 16   Temp: 97.1 °F (36.2 °C)   TempSrc: Tympanic   SpO2: 96%   Weight: 190 lb (86.2 kg)   Height: 5' 9\" (1.753 m)         Physical Exam   Constitutional: He is oriented to person, place, and time. He appears well-developed and well-nourished. HENT:   Head: Normocephalic and atraumatic. Nose: Nose normal.   Mouth/Throat: Oropharynx is clear and moist.   Eyes: Pupils are equal, round, and reactive to light. Conjunctivae and EOM are normal.   Neck: No JVD present. No tracheal deviation present. No thyromegaly present.    Cardiovascular:

## 2019-10-03 ENCOUNTER — HOSPITAL ENCOUNTER (OUTPATIENT)
Dept: PHARMACY | Age: 84
Setting detail: THERAPIES SERIES
Discharge: HOME | End: 2019-10-03
Payer: MEDICARE

## 2019-10-03 DIAGNOSIS — I48.91 ATRIAL FIBRILLATION, UNSPECIFIED TYPE (HCC): ICD-10-CM

## 2019-10-03 LAB — INTERNATIONAL NORMALIZATION RATIO, POC: 2

## 2019-10-03 PROCEDURE — 99211 OFF/OP EST MAY X REQ PHY/QHP: CPT

## 2019-10-03 PROCEDURE — 85610 PROTHROMBIN TIME: CPT

## 2019-11-14 ENCOUNTER — HOSPITAL ENCOUNTER (OUTPATIENT)
Dept: PHARMACY | Age: 84
Setting detail: THERAPIES SERIES
Discharge: HOME | End: 2019-11-14
Payer: MEDICARE

## 2019-11-14 DIAGNOSIS — I48.91 ATRIAL FIBRILLATION, UNSPECIFIED TYPE (HCC): ICD-10-CM

## 2019-11-14 LAB — INTERNATIONAL NORMALIZATION RATIO, POC: 1.5

## 2019-11-14 PROCEDURE — 99211 OFF/OP EST MAY X REQ PHY/QHP: CPT | Performed by: PHARMACIST

## 2019-11-14 PROCEDURE — 85610 PROTHROMBIN TIME: CPT | Performed by: PHARMACIST

## 2019-11-20 RX ORDER — WARFARIN SODIUM 5 MG/1
TABLET ORAL
Qty: 100 TABLET | Refills: 1 | Status: SHIPPED | OUTPATIENT
Start: 2019-11-20 | End: 2020-07-08 | Stop reason: SDUPTHER

## 2019-12-04 ENCOUNTER — HOSPITAL ENCOUNTER (OUTPATIENT)
Dept: PHARMACY | Age: 84
Setting detail: THERAPIES SERIES
Discharge: HOME | End: 2019-12-04
Payer: MEDICARE

## 2019-12-04 DIAGNOSIS — I48.91 ATRIAL FIBRILLATION, UNSPECIFIED TYPE (HCC): ICD-10-CM

## 2019-12-04 LAB — INTERNATIONAL NORMALIZATION RATIO, POC: 2.4

## 2019-12-04 PROCEDURE — 85610 PROTHROMBIN TIME: CPT

## 2019-12-04 PROCEDURE — 99211 OFF/OP EST MAY X REQ PHY/QHP: CPT

## 2020-01-16 ENCOUNTER — HOSPITAL ENCOUNTER (OUTPATIENT)
Dept: PHARMACY | Age: 85
Setting detail: THERAPIES SERIES
Discharge: HOME | End: 2020-01-16
Payer: MEDICARE

## 2020-01-16 LAB — INTERNATIONAL NORMALIZATION RATIO, POC: 2.1

## 2020-01-16 PROCEDURE — 99211 OFF/OP EST MAY X REQ PHY/QHP: CPT | Performed by: PHARMACIST

## 2020-01-16 PROCEDURE — 85610 PROTHROMBIN TIME: CPT | Performed by: PHARMACIST

## 2020-02-27 ENCOUNTER — HOSPITAL ENCOUNTER (OUTPATIENT)
Dept: PHARMACY | Age: 85
Setting detail: THERAPIES SERIES
Discharge: HOME | End: 2020-02-27
Payer: MEDICARE

## 2020-02-27 LAB — INTERNATIONAL NORMALIZATION RATIO, POC: 1.9

## 2020-02-27 PROCEDURE — 85610 PROTHROMBIN TIME: CPT | Performed by: PHARMACIST

## 2020-02-27 PROCEDURE — 99211 OFF/OP EST MAY X REQ PHY/QHP: CPT | Performed by: PHARMACIST

## 2020-04-10 ENCOUNTER — TELEPHONE (OUTPATIENT)
Dept: PHARMACY | Age: 85
End: 2020-04-10

## 2020-05-21 ENCOUNTER — HOSPITAL ENCOUNTER (OUTPATIENT)
Dept: PHARMACY | Age: 85
Setting detail: THERAPIES SERIES
Discharge: HOME | End: 2020-05-21
Payer: MEDICARE

## 2020-05-21 DIAGNOSIS — I48.91 ATRIAL FIBRILLATION, UNSPECIFIED TYPE (HCC): ICD-10-CM

## 2020-05-21 LAB
ANION GAP SERPL CALCULATED.3IONS-SCNC: 12 MEQ/L (ref 9–15)
CHLORIDE BLD-SCNC: 101 MEQ/L (ref 95–107)
CHOLESTEROL, TOTAL: 126 MG/DL (ref 0–199)
CO2: 26 MEQ/L (ref 20–31)
DIGOXIN LEVEL: 0.8 NG/ML (ref 0.8–2)
HDLC SERPL-MCNC: 63 MG/DL (ref 40–59)
INR BLD: 2
LDL CHOLESTEROL CALCULATED: 50 MG/DL (ref 0–129)
MAGNESIUM: 2.4 MG/DL (ref 1.7–2.4)
POTASSIUM SERPL-SCNC: 3.8 MEQ/L (ref 3.4–4.9)
PROTHROMBIN TIME: 22.9 SEC (ref 12.3–14.9)
SODIUM BLD-SCNC: 139 MEQ/L (ref 135–144)
TOTAL CK: 126 U/L (ref 0–190)
TRIGL SERPL-MCNC: 66 MG/DL (ref 0–150)

## 2020-05-21 PROCEDURE — 99211 OFF/OP EST MAY X REQ PHY/QHP: CPT

## 2020-05-21 NOTE — PROGRESS NOTES
Mr. Tayla Robin is a 82 y.o. y/o male with history of Afib who was called today following lab venipuncture for anticoagulation monitoring and adjustment.   INR 2.0 is therapeutic for this patient (goal range 2-3) and is reflective of 35 mg TWD  Patient verifies current dosing regimen, patient able to verbally recall dose  Patient reports 0  missed doses since last INR   Patient denies s/sx clotting and/or stroke  Patient denies hematuria, epistaxis, rectal bleeding  Patient denies changes in diet, alcohol, or tobacco use  Reviewed medication list and drug allergies with patient, updated any medication additions or modifications accordingly  Patient also denies any pending medical or dental procedures scheduled at this time  Patient was instructed to continue 35 mg TWD and RTC or lab venipuncture 6 weeks  Standing order placed for INR venipuncture lab draw if needed during COVID-19 protocol      CLINICAL PHARMACY CONSULT: MED RECONCILIATION/REVIEW [address]: No  Total # of Interventions Recommended: 1    - Maintenance Safety Lab Monitoring #: 1    Total Interventions Accepted: 1  Time Spent (min): [address]  [facility]

## 2020-05-22 LAB
BUN BLDV-MCNC: 19 MG/DL (ref 8–23)
CREAT SERPL-MCNC: 1.05 MG/DL (ref 0.7–1.2)
GFR AFRICAN AMERICAN: >60
GFR NON-AFRICAN AMERICAN: >60

## 2020-07-02 ENCOUNTER — HOSPITAL ENCOUNTER (OUTPATIENT)
Dept: PHARMACY | Age: 85
Setting detail: THERAPIES SERIES
Discharge: HOME | End: 2020-07-02
Payer: MEDICARE

## 2020-07-02 VITALS — TEMPERATURE: 98.6 F

## 2020-07-02 LAB — INTERNATIONAL NORMALIZATION RATIO, POC: 2

## 2020-07-02 PROCEDURE — 99211 OFF/OP EST MAY X REQ PHY/QHP: CPT

## 2020-07-02 PROCEDURE — 85610 PROTHROMBIN TIME: CPT

## 2020-07-02 NOTE — PROGRESS NOTES
Mr. Maricruz Verma is a 82 y.o. y/o male with history of Afib who presents today for anticoagulation monitoring and adjustment. INR 2.0 is therapeutic for this patient (goal range 2-3) and is reflective of 35 mg TWD  Patient verifies current dosing regimen, patient able to verbally recall dose  Patient reports 0  missed doses since last INR   Patient denies s/sx clotting and/or stroke  Patient denies hematuria, epistaxis, rectal bleeding  Patient denies changes in diet, alcohol, or tobacco use  Reviewed medication list and drug allergies with patient, updated any medication additions or modifications accordingly  Patient also denies any pending medical or dental procedures scheduled at this time  Patient was instructed to continue 35 mg TWD and RTC 6 weeks      CLINICAL PHARMACY CONSULT: MED RECONCILIATION/REVIEW ADDENDUM    For Pharmacy Admin Tracking Only    PHSO: No  Total # of Interventions Recommended: 0    - Maintenance Safety Lab Monitoring #: 1  Recommended intervention potential cost savings:      Total Interventions Accepted: 0  Time Spent (min): 15

## 2020-07-08 RX ORDER — WARFARIN SODIUM 5 MG/1
TABLET ORAL
Qty: 100 TABLET | Refills: 1 | Status: SHIPPED | OUTPATIENT
Start: 2020-07-08 | End: 2021-01-19 | Stop reason: SDUPTHER

## 2020-08-13 ENCOUNTER — HOSPITAL ENCOUNTER (OUTPATIENT)
Dept: PHARMACY | Age: 85
Setting detail: THERAPIES SERIES
Discharge: HOME | End: 2020-08-13
Payer: MEDICARE

## 2020-08-13 VITALS — TEMPERATURE: 98 F

## 2020-08-13 LAB — INTERNATIONAL NORMALIZATION RATIO, POC: 2.3

## 2020-08-13 PROCEDURE — 85610 PROTHROMBIN TIME: CPT | Performed by: PHARMACIST

## 2020-08-13 PROCEDURE — 99211 OFF/OP EST MAY X REQ PHY/QHP: CPT | Performed by: PHARMACIST

## 2020-09-24 ENCOUNTER — HOSPITAL ENCOUNTER (OUTPATIENT)
Dept: PHARMACY | Age: 85
Setting detail: THERAPIES SERIES
Discharge: HOME | End: 2020-09-24
Payer: MEDICARE

## 2020-09-24 VITALS — TEMPERATURE: 97.5 F

## 2020-09-24 LAB — INTERNATIONAL NORMALIZATION RATIO, POC: 2.5

## 2020-09-24 PROCEDURE — 85610 PROTHROMBIN TIME: CPT | Performed by: PHARMACIST

## 2020-09-24 PROCEDURE — 99211 OFF/OP EST MAY X REQ PHY/QHP: CPT | Performed by: PHARMACIST

## 2020-09-28 ENCOUNTER — OFFICE VISIT (OUTPATIENT)
Dept: PULMONOLOGY | Age: 85
End: 2020-09-28
Payer: MEDICARE

## 2020-09-28 VITALS
HEIGHT: 70 IN | HEART RATE: 75 BPM | DIASTOLIC BLOOD PRESSURE: 58 MMHG | TEMPERATURE: 98.1 F | SYSTOLIC BLOOD PRESSURE: 120 MMHG | WEIGHT: 192 LBS | OXYGEN SATURATION: 97 % | BODY MASS INDEX: 27.49 KG/M2 | RESPIRATION RATE: 16 BRPM

## 2020-09-28 PROCEDURE — G8417 CALC BMI ABV UP PARAM F/U: HCPCS | Performed by: INTERNAL MEDICINE

## 2020-09-28 PROCEDURE — 99214 OFFICE O/P EST MOD 30 MIN: CPT | Performed by: INTERNAL MEDICINE

## 2020-09-28 PROCEDURE — 1036F TOBACCO NON-USER: CPT | Performed by: INTERNAL MEDICINE

## 2020-09-28 PROCEDURE — 1123F ACP DISCUSS/DSCN MKR DOCD: CPT | Performed by: INTERNAL MEDICINE

## 2020-09-28 PROCEDURE — G8427 DOCREV CUR MEDS BY ELIG CLIN: HCPCS | Performed by: INTERNAL MEDICINE

## 2020-09-28 PROCEDURE — 4040F PNEUMOC VAC/ADMIN/RCVD: CPT | Performed by: INTERNAL MEDICINE

## 2020-09-28 RX ORDER — AMLODIPINE BESYLATE 5 MG/1
TABLET ORAL
COMMUNITY
Start: 2020-09-09

## 2020-09-28 ASSESSMENT — ENCOUNTER SYMPTOMS
WHEEZING: 0
SORE THROAT: 0
VOMITING: 0
NAUSEA: 0
SHORTNESS OF BREATH: 0
EYE ITCHING: 0
ABDOMINAL PAIN: 0
COUGH: 0
DIARRHEA: 0
RHINORRHEA: 0
CHEST TIGHTNESS: 0
VOICE CHANGE: 0

## 2020-09-28 NOTE — PROGRESS NOTES
Subjective:     Margoth Malik is a 82 y.o. male who complains today of:     Chief Complaint   Patient presents with    Follow-up     one year f/u OSVALDO. HPI  He is using CPAP with 7 centimeters of H2O with heated humidity. He is using CPAP for about  7-8 hours every night. He is using CPAP with Nasal Mask. DME is [employer], he received supply 1 month ago   He said  sleep is restful with the CPAP use. He is compliant with CPAP therapy and benefiting with CPAP use. No snoring with CPAP use. No complaint of daytime sleepiness or tiredness with CPAP use. He denies taking naps. No sleepiness with driving. He denies difficulty falling asleep or staying asleep.       Allergies:  Pcn [penicillins] and Sulfa antibiotics  Past Medical History:   Diagnosis Date    Atrial fibrillation (HCC)     HTN (hypertension)     OSVALDO on CPAP      Past Surgical History:   Procedure Laterality Date    CATARACT REMOVAL  2011     Family History   Problem Relation Age of Onset    Heart Disease Father      Social History     Socioeconomic History    Marital status:      Spouse name: Not on file    Number of children: Not on file    Years of education: Not on file    Highest education level: Not on file   Occupational History    Not on file   Social Needs    Financial resource strain: Not on file    Food insecurity     Worry: Not on file     Inability: Not on file    Transportation needs     Medical: Not on file     Non-medical: Not on file   Tobacco Use    Smoking status: Never Smoker    Smokeless tobacco: Never Used   Substance and Sexual Activity    Alcohol use: Not on file    Drug use: Not on file    Sexual activity: Not on file   Lifestyle    Physical activity     Days per week: Not on file     Minutes per session: Not on file    Stress: Not on file   Relationships    Social connections     Talks on phone: Not on file     Gets together: Not on file     Attends Confucianism service: Not on file Standard TWO VW    Narrative EXAMINATION CHEST TWO VIEWS     CLINICAL HISTORY Shortness of breath     COMPARISONS 5/8/09     FINDINGS Heart, mediastinum, and hilum are unremarkable. Dense  calcified granuloma of the left lower lobe is again noted. Lungs are  clear. Pulmonary vasculature is normal.  No effusions are seen. Bones  are intact. IMPRESSION NO ACTIVE CHEST DISEASE. Poly Shekhar English M.D. Released Jody English M.D. Released Date Time- 11/07/14 5102   This document has been electronically signed. ------------------------------------------------------------------------------       Assessment/Plan:     1. OSVALDO on CPAP  He is using CPAP with 7 centimeters of H2O with heated humidity. He is using CPAP for about  7-8 hours every night. He is using CPAP with Nasal Mask. DME is [facility], he received supply 1 month ago . He said  sleep is restful with the CPAP use. He is compliant with CPAP therapy and benefiting with CPAP use. No snoring with CPAP use. Continue CPAP as before. Counseling: CPAP/BiPAP uses, patient advised to use CPAP at least 5-6 hours every night. Driving: patient is advised for extreme caution when driving or operating machinery if there is a feeling of drowsiness, especially while driving it is preferable to stop driving and take a brief nap. Sleep hygiene:Avoid supine sleep, sleep on  sides. Avoid  sleep deprivation. Explained sleep hygiene. Advice to avoid Alcohol and sedative    Time spend over 25 min. Face to face. with greater than 50 % time with counseling regarding CPAP therapy. 2. Overweight (BMI 25.0-29. 9)  Patient patient is advised try to lose weight. obesity related risk explained to the patient ,  Current weight:  192 lb (87.1 kg) Lbs. BMI:  Body mass index is 27.55 kg/m². Suggested weight control approaches, including dietary changes , exercise, behavioral modification.         Return in

## 2020-11-05 ENCOUNTER — HOSPITAL ENCOUNTER (OUTPATIENT)
Dept: PHARMACY | Age: 85
Setting detail: THERAPIES SERIES
Discharge: HOME | End: 2020-11-05
Payer: MEDICARE

## 2020-11-05 VITALS — TEMPERATURE: 97.5 F

## 2020-11-05 LAB — INTERNATIONAL NORMALIZATION RATIO, POC: 1.8

## 2020-11-05 PROCEDURE — 85610 PROTHROMBIN TIME: CPT

## 2020-11-05 PROCEDURE — 99211 OFF/OP EST MAY X REQ PHY/QHP: CPT

## 2020-11-05 NOTE — PROGRESS NOTES
Mr. Kelly Allen is a 82 y.o. y/o male with history of Afib who presents today for anticoagulation monitoring and adjustment.   Today's INR at 1.8 is SUBtherapeutic for this patient (goal range 2 - 3) and is reflective of 35 mg TWD  Patient verifies current dosing regimen, patient able to verbally recall dose  Patient reports ZERO  missed doses since last INR   Patient denies s/sx clotting and/or stroke  Patient denies hematuria, epistaxis, rectal bleeding  Patient denies changes in diet, alcohol, or tobacco use  Reviewed medication list and drug allergies with patient, updated any medication additions or modifications accordingly  Patient also denies any pending medical or dental procedures scheduled at this time    Patient was instructed to boost today's dose (5 mg -> 7.5 mg) then resume 35 mg TWD and RTC in 2 weeks    CLINICAL PHARMACY CONSULT: MED RECONCILIATION/REVIEW [address]: No  Total # of Interventions Recommended: 2  - Increased Dose #: 1  - Maintenance Safety Lab Monitoring #: 1  Total Interventions Accepted: 2  Time Spent (min): [address], [facility]

## 2020-11-19 ENCOUNTER — HOSPITAL ENCOUNTER (OUTPATIENT)
Dept: PHARMACY | Age: 85
Setting detail: THERAPIES SERIES
Discharge: HOME | End: 2020-11-19
Payer: MEDICARE

## 2020-11-19 VITALS — TEMPERATURE: 97.7 F

## 2020-11-19 LAB — INTERNATIONAL NORMALIZATION RATIO, POC: 2.5

## 2020-11-19 PROCEDURE — 99211 OFF/OP EST MAY X REQ PHY/QHP: CPT | Performed by: PHARMACIST

## 2020-11-19 PROCEDURE — 85610 PROTHROMBIN TIME: CPT | Performed by: PHARMACIST

## 2020-11-19 NOTE — PROGRESS NOTES
Mr. Adelso Hernandez is a [address] y.o. y/o male with history of Afib who presents today for anticoagulation monitoring and adjustment. INR 2.5 is therapeutic for this patient (goal range 2.0-3.0) and is reflective of 35 mg TWD  Patient verifies current dosing regimen, patient able to verbally recall dose  Patient reports 0  missed doses since last INR   Patient denies s/sx clotting and/or stroke  Patient denies hematuria, epistaxis, rectal bleeding  Patient denies changes in diet, alcohol, or tobacco use  Reviewed medication list and drug allergies with patient, updated any medication additions or modifications accordingly  Patient also denies any pending medical or dental procedures scheduled at this time  Patient was instructed to continue current regimen and RTC 6 weeks    Unable to identify why the INR was low at the last visit. CLINICAL PHARMACY CONSULT: MED RECONCILIATION/REVIEW ADDENDUM    For Pharmacy Admin Tracking Only    PHSO: No  Total # of Interventions Recommended: 1  - Maintenance Safety Lab Monitoring #: 1  Total Interventions Accepted: 1  Time Spent (min): 15    CHATO Goodrich Ph. 11/19/2020 8:59 AM

## 2020-12-31 ENCOUNTER — HOSPITAL ENCOUNTER (OUTPATIENT)
Dept: PHARMACY | Age: 85
Setting detail: THERAPIES SERIES
Discharge: HOME | End: 2020-12-31
Payer: MEDICARE

## 2020-12-31 DIAGNOSIS — I48.91 ATRIAL FIBRILLATION, UNSPECIFIED TYPE (HCC): ICD-10-CM

## 2020-12-31 LAB — INTERNATIONAL NORMALIZATION RATIO, POC: 1.9

## 2020-12-31 PROCEDURE — 99211 OFF/OP EST MAY X REQ PHY/QHP: CPT | Performed by: PHARMACIST

## 2020-12-31 PROCEDURE — 85610 PROTHROMBIN TIME: CPT | Performed by: PHARMACIST

## 2020-12-31 NOTE — PROGRESS NOTES
Mr. Flory Mandel is a 82 y.o. y/o male with history of Afib who presents today for anticoagulation monitoring and adjustment.   INR 1.9 is slightly sub-therapeutic for this patient (goal range 2-3) and is reflective of 35 mg TWD  Patient verifies current dosing regimen, patient able to verbally recall dose  Patient reports NO  missed doses since last INR   Patient denies s/sx clotting and/or stroke  Patient denies hematuria, epistaxis, rectal bleeding  Patient denies changes in diet, alcohol, or tobacco use  Watch amounts of Vitamin K [place]  Reviewed medication list and drug allergies with patient, updated any medication additions or modifications accordingly  Patient also denies any pending medical or dental procedures scheduled at this time  Patient was instructed to continue 35 mg TWD and RTC 6 weeks    CLINICAL PHARMACY CONSULT: MED RECONCILIATION/REVIEW [address]: No  Total # of Interventions Recommended: 1  - Increased Dose #: 0  - Maintenance Safety Lab Monitoring #: 1    Total Interventions Accepted: 1  Time Spent (min): [address], [place] [address]

## 2021-01-19 RX ORDER — WARFARIN SODIUM 5 MG/1
TABLET ORAL
Qty: 100 TABLET | Refills: 1 | Status: SHIPPED | OUTPATIENT
Start: 2021-01-19 | End: 2021-08-04 | Stop reason: SDUPTHER

## 2021-02-11 ENCOUNTER — HOSPITAL ENCOUNTER (OUTPATIENT)
Dept: PHARMACY | Age: 86
Setting detail: THERAPIES SERIES
Discharge: HOME | End: 2021-02-11
Payer: MEDICARE

## 2021-02-11 VITALS — TEMPERATURE: 96.7 F

## 2021-02-11 DIAGNOSIS — I48.91 ATRIAL FIBRILLATION, UNSPECIFIED TYPE (HCC): ICD-10-CM

## 2021-02-11 LAB — INTERNATIONAL NORMALIZATION RATIO, POC: 3.4

## 2021-02-11 PROCEDURE — 99211 OFF/OP EST MAY X REQ PHY/QHP: CPT

## 2021-02-11 PROCEDURE — 85610 PROTHROMBIN TIME: CPT

## 2021-02-11 NOTE — PROGRESS NOTES
Mr. Shazia Pryor is a 82 y.o. y/o male with history of Afib who presents today for anticoagulation monitoring and adjustment.   INR 3.4 is supra therapeutic for this patient (goal range 2-3) and is reflective of 35 mg TWD  Patient verifies current dosing regimen, patient able to verbally recall dose  Patient reports 0  missed doses since last INR   Patient denies s/sx clotting and/or stroke  Patient denies hematuria, epistaxis, rectal bleeding  Patient denies changes in diet, alcohol, or tobacco use  Reviewed medication list and drug allergies with patient, updated any medication additions or modifications accordingly  Patient also denies any pending medical or dental procedures scheduled at this time  Patient was instructed to hold warfarin today, then resume 35 mg TWD and RTC 4 weeks (usual 6 weeks)    CLINICAL PHARMACY CONSULT: MED RECONCILIATION/REVIEW [address]: No  Total # of Interventions Recommended: 2  - Decreased Dose #: 1  - Maintenance Safety Lab Monitoring #: 1    Total Interventions Accepted: 2  Time Spent (min): [address] [place], [facility]

## 2021-02-18 ENCOUNTER — HOSPITAL ENCOUNTER (OUTPATIENT)
Dept: WOMENS IMAGING | Age: 86
Discharge: HOME | End: 2021-02-20
Payer: MEDICARE

## 2021-02-18 ENCOUNTER — HOSPITAL ENCOUNTER (OUTPATIENT)
Dept: ULTRASOUND IMAGING | Age: 86
Discharge: HOME | End: 2021-02-20
Payer: MEDICARE

## 2021-02-18 DIAGNOSIS — N64.4 BREAST TENDERNESS IN MALE: ICD-10-CM

## 2021-02-18 DIAGNOSIS — N64.4 BREAST TENDERNESS: ICD-10-CM

## 2021-02-18 PROCEDURE — G0279 TOMOSYNTHESIS, MAMMO: HCPCS

## 2021-02-18 PROCEDURE — 76642 ULTRASOUND BREAST LIMITED: CPT

## 2021-03-11 ENCOUNTER — HOSPITAL ENCOUNTER (OUTPATIENT)
Dept: PHARMACY | Age: 86
Setting detail: THERAPIES SERIES
Discharge: HOME | End: 2021-03-11
Payer: MEDICARE

## 2021-03-11 VITALS — TEMPERATURE: 97.3 F

## 2021-03-11 DIAGNOSIS — I48.91 ATRIAL FIBRILLATION, UNSPECIFIED TYPE (HCC): ICD-10-CM

## 2021-03-11 LAB — INTERNATIONAL NORMALIZATION RATIO, POC: 2

## 2021-03-11 PROCEDURE — 99211 OFF/OP EST MAY X REQ PHY/QHP: CPT | Performed by: PHARMACIST

## 2021-03-11 PROCEDURE — 85610 PROTHROMBIN TIME: CPT | Performed by: PHARMACIST

## 2021-03-11 NOTE — PROGRESS NOTES
Mr. Gerardo Hodgkin is a 82 y.o. y/o male with history of Afib who presents today for anticoagulation monitoring and adjustment.   INR 2.0 is therapeutic for this patient (goal range 2-3) and is reflective of 35 mg TWD  Patient verifies current dosing regimen, patient able to verbally recall dose  Patient reports NO missed doses since last INR   Patient denies s/sx clotting and/or stroke  Patient denies hematuria, epistaxis, rectal bleeding  Patient denies changes in diet, alcohol, or tobacco use  Reviewed medication list and drug allergies with patient, updated any medication additions or modifications accordingly  Patient also denies any pending medical or dental procedures scheduled at this time  Patient was instructed to continue 35 mg TWD and RTC 6 weeks    CLINICAL PHARMACY CONSULT: MED RECONCILIATION/REVIEW [address]: No  Total # of Interventions Recommended: 1  - Increased Dose #: 0  - Maintenance Safety Lab Monitoring #: 1    Total Interventions Accepted: 1  Time Spent (min): [address], [place] [address]

## 2021-04-22 ENCOUNTER — HOSPITAL ENCOUNTER (OUTPATIENT)
Dept: PHARMACY | Age: 86
Setting detail: THERAPIES SERIES
Discharge: HOME | End: 2021-04-22
Payer: MEDICARE

## 2021-04-22 DIAGNOSIS — I48.91 ATRIAL FIBRILLATION, UNSPECIFIED TYPE (HCC): ICD-10-CM

## 2021-04-22 LAB — INTERNATIONAL NORMALIZATION RATIO, POC: 2.3

## 2021-04-22 PROCEDURE — 85610 PROTHROMBIN TIME: CPT | Performed by: PHARMACIST

## 2021-04-22 PROCEDURE — 99211 OFF/OP EST MAY X REQ PHY/QHP: CPT | Performed by: PHARMACIST

## 2021-04-22 NOTE — PROGRESS NOTES
Mr. Carlie Gaviria is a 82 y.o. y/o male with history of Afib who presents today for anticoagulation monitoring and adjustment. INR 2.3 is therapeutic for this patient (goal range 2-3) and is reflective of 35 mg TWD  Patient verifies current dosing regimen, patient able to verbally recall dose  Patient reports NO  missed doses since last INR   Patient denies s/sx clotting and/or stroke  Patient denies hematuria, epistaxis, rectal bleeding  Patient denies changes in diet, alcohol, or tobacco use  Reviewed medication list and drug allergies with patient, updated any medication additions or modifications accordingly  Patient also denies any pending medical or dental procedures scheduled at this time  Patient was instructed to continue 35 mg TWD and RTC 6 weeks    For Pharmacy Admin Tracking Only     Intervention Detail: Adherence Monitorin   Total # of Interventions Recommended: 1   Total # of Interventions Accepted: 1   Time Spent (min): [address] Ph.  2021  8:32 AM

## 2021-05-25 LAB
ANION GAP SERPL CALCULATED.3IONS-SCNC: 15 MEQ/L (ref 9–15)
BUN BLDV-MCNC: 20 MG/DL (ref 8–23)
CHLORIDE BLD-SCNC: 104 MEQ/L (ref 95–107)
CHOLESTEROL, TOTAL: 153 MG/DL (ref 0–199)
CO2: 25 MEQ/L (ref 20–31)
CREAT SERPL-MCNC: 0.98 MG/DL (ref 0.7–1.2)
DIGOXIN LEVEL: 0.8 NG/ML (ref 0.8–2)
GFR AFRICAN AMERICAN: >60
GFR NON-AFRICAN AMERICAN: >60
HDLC SERPL-MCNC: 55 MG/DL (ref 40–59)
LDL CHOLESTEROL CALCULATED: 80 MG/DL (ref 0–129)
MAGNESIUM: 2.3 MG/DL (ref 1.7–2.4)
POTASSIUM SERPL-SCNC: 3.8 MEQ/L (ref 3.4–4.9)
SODIUM BLD-SCNC: 144 MEQ/L (ref 135–144)
TOTAL CK: 97 U/L (ref 0–190)
TRIGL SERPL-MCNC: 91 MG/DL (ref 0–150)

## 2021-06-03 ENCOUNTER — HOSPITAL ENCOUNTER (OUTPATIENT)
Dept: PHARMACY | Age: 86
Setting detail: THERAPIES SERIES
Discharge: HOME | End: 2021-06-03
Payer: MEDICARE

## 2021-06-03 DIAGNOSIS — I48.91 ATRIAL FIBRILLATION, UNSPECIFIED TYPE (HCC): Primary | ICD-10-CM

## 2021-06-03 LAB — INTERNATIONAL NORMALIZATION RATIO, POC: 2.2

## 2021-06-03 PROCEDURE — 99211 OFF/OP EST MAY X REQ PHY/QHP: CPT

## 2021-06-03 PROCEDURE — 85610 PROTHROMBIN TIME: CPT

## 2021-06-03 NOTE — PROGRESS NOTES
Mr. Angelo Mak is a 82 y.o. y/o male with history of Afib who presents today for anticoagulation monitoring and adjustment.   INR 2.2 is therapeutic for this patient (goal range 2-3) and is reflective of 35 mg TWD  Patient verifies current dosing regimen, patient able to verbally recall dose  Patient reports no  missed doses since last INR   Patient denies s/sx clotting and/or stroke  Patient denies hematuria, epistaxis, rectal bleeding  Patient denies changes in diet, alcohol, or tobacco use  Reviewed medication list and drug allergies with patient, updated any medication additions or modifications accordingly  Patient also denies any pending medical or dental procedures scheduled at this time  Patient was instructed to continue current regimen and RTC 6 weeks    For Pharmacy Admin Tracking Only     Intervention Detail: Lab(s) Ordered   Total # of Interventions Recommended: 1   Total # of Interventions Accepted: 1   Time Spent (min): 30    Nancy Reich, PharmD, [address] [facility]  Staff Pharmacist  6/3/2021 8:59 AM

## 2021-07-15 ENCOUNTER — HOSPITAL ENCOUNTER (OUTPATIENT)
Dept: PHARMACY | Age: 86
Setting detail: THERAPIES SERIES
Discharge: HOME | End: 2021-07-15
Payer: MEDICARE

## 2021-07-15 DIAGNOSIS — I48.91 ATRIAL FIBRILLATION, UNSPECIFIED TYPE (HCC): Primary | ICD-10-CM

## 2021-07-15 LAB — INTERNATIONAL NORMALIZATION RATIO, POC: 2

## 2021-07-15 PROCEDURE — 99211 OFF/OP EST MAY X REQ PHY/QHP: CPT

## 2021-07-15 PROCEDURE — 85610 PROTHROMBIN TIME: CPT

## 2021-07-15 NOTE — PROGRESS NOTES
Mr. Martha Puga is a 82 y.o. y/o male with history of Afib who presents today for anticoagulation monitoring and adjustment.   INR 2.0 is therapeutic for this patient (goal range 2-3) and is reflective of 35 mg TWD  Patient verifies current dosing regimen, patient able to verbally recall dose  Patient reports 0  missed doses since last INR   Patient denies s/sx clotting and/or stroke  Patient denies hematuria, epistaxis, rectal bleeding  Patient denies changes in diet, alcohol, or tobacco use  Reviewed medication list and drug allergies with patient, updated any medication additions or modifications accordingly  Patient also denies any pending medical or dental procedures scheduled at this time  Patient was instructed to continue 35 mg TWD and RTC 6 weeks    For Pharmacy Admin Tracking Only     Intervention Detail: Adherence Monitorin   Total # of Interventions Recommended: 1   Total # of Interventions Accepted: 1   Time Spent (min): 15

## 2021-08-04 RX ORDER — WARFARIN SODIUM 5 MG/1
TABLET ORAL
Qty: 100 TABLET | Refills: 1 | Status: SHIPPED | OUTPATIENT
Start: 2021-08-04 | End: 2022-04-11 | Stop reason: SDUPTHER

## 2021-08-26 ENCOUNTER — HOSPITAL ENCOUNTER (OUTPATIENT)
Dept: PHARMACY | Age: 86
Setting detail: THERAPIES SERIES
Discharge: HOME | End: 2021-08-26
Payer: MEDICARE

## 2021-08-26 DIAGNOSIS — I48.91 ATRIAL FIBRILLATION, UNSPECIFIED TYPE (HCC): Primary | ICD-10-CM

## 2021-08-26 LAB — INTERNATIONAL NORMALIZATION RATIO, POC: 2.1

## 2021-08-26 PROCEDURE — 99211 OFF/OP EST MAY X REQ PHY/QHP: CPT

## 2021-08-26 PROCEDURE — 85610 PROTHROMBIN TIME: CPT

## 2021-08-26 NOTE — PROGRESS NOTES
Mr. Keyla Morton is a 82 y.o. y/o male with history of Afib who presents today for anticoagulation monitoring and adjustment.   INR 2.1 is therapeutic for this patient (goal range 2-3) and is reflective of 35 mg TWD  Patient verifies current dosing regimen, patient able to verbally recall dose  Patient reports 0  missed doses since last INR   Patient denies s/sx clotting and/or stroke  Patient denies hematuria, epistaxis, rectal bleeding  Patient denies changes in diet, alcohol, or tobacco use  Reviewed medication list and drug allergies with patient, updated any medication additions or modifications accordingly  Patient also denies any pending medical or dental procedures scheduled at this time  Patient was instructed to continue current TWD of 35 mg and RTC 6 weeks            For Pharmacy Admin Tracking Only     Intervention Detail: Adherence Monitorin   Total # of Interventions Recommended: 1   Total # of Interventions Accepted: 1   Time Spent (min): 20

## 2021-08-31 ENCOUNTER — OFFICE VISIT (OUTPATIENT)
Dept: FAMILY MEDICINE CLINIC | Age: 86
End: 2021-08-31
Payer: MEDICARE

## 2021-08-31 VITALS
HEIGHT: 70 IN | TEMPERATURE: 97.5 F | OXYGEN SATURATION: 97 % | WEIGHT: 192 LBS | BODY MASS INDEX: 27.49 KG/M2 | HEART RATE: 71 BPM | DIASTOLIC BLOOD PRESSURE: 60 MMHG | SYSTOLIC BLOOD PRESSURE: 118 MMHG

## 2021-08-31 DIAGNOSIS — H65.191 ACUTE OTITIS MEDIA WITH EFFUSION OF RIGHT EAR: Primary | ICD-10-CM

## 2021-08-31 PROCEDURE — 99213 OFFICE O/P EST LOW 20 MIN: CPT | Performed by: NURSE PRACTITIONER

## 2021-08-31 RX ORDER — CEFUROXIME AXETIL 250 MG/1
250 TABLET ORAL 2 TIMES DAILY
Qty: 14 TABLET | Refills: 0 | Status: SHIPPED | OUTPATIENT
Start: 2021-08-31 | End: 2021-09-07

## 2021-08-31 RX ORDER — LOSARTAN POTASSIUM AND HYDROCHLOROTHIAZIDE 12.5; 5 MG/1; MG/1
TABLET ORAL
COMMUNITY
Start: 2020-09-09

## 2021-08-31 SDOH — ECONOMIC STABILITY: FOOD INSECURITY: WITHIN THE PAST 12 MONTHS, THE FOOD YOU BOUGHT JUST DIDN'T LAST AND YOU DIDN'T HAVE MONEY TO GET MORE.: NEVER TRUE

## 2021-08-31 SDOH — ECONOMIC STABILITY: FOOD INSECURITY: WITHIN THE PAST 12 MONTHS, YOU WORRIED THAT YOUR FOOD WOULD RUN OUT BEFORE YOU GOT MONEY TO BUY MORE.: NEVER TRUE

## 2021-08-31 ASSESSMENT — SOCIAL DETERMINANTS OF HEALTH (SDOH): HOW HARD IS IT FOR YOU TO PAY FOR THE VERY BASICS LIKE FOOD, HOUSING, MEDICAL CARE, AND HEATING?: NOT HARD AT ALL

## 2021-08-31 ASSESSMENT — ENCOUNTER SYMPTOMS
RHINORRHEA: 0
COUGH: 0
SHORTNESS OF BREATH: 0
SORE THROAT: 0

## 2021-08-31 ASSESSMENT — PATIENT HEALTH QUESTIONNAIRE - PHQ9
SUM OF ALL RESPONSES TO PHQ QUESTIONS 1-9: 0
2. FEELING DOWN, DEPRESSED OR HOPELESS: 0
SUM OF ALL RESPONSES TO PHQ QUESTIONS 1-9: 0
SUM OF ALL RESPONSES TO PHQ9 QUESTIONS 1 & 2: 0
SUM OF ALL RESPONSES TO PHQ QUESTIONS 1-9: 0
1. LITTLE INTEREST OR PLEASURE IN DOING THINGS: 0

## 2021-08-31 NOTE — PROGRESS NOTES
[address] Encounter    CHIEF COMPLAINT:   Soni Blackwell (: 1935) is a 82 y.o. male who presents today for:    Chief Complaint   Patient presents with    Otalgia     Pt states that ear pain started about 1 week ago pt states that he has been trying a hot compress but its not really working      ASSESSMENT/PLAN  1. Acute otitis media with effusion of right ear  Comments:  fullness + intermittent pain x 1wk, improved today. wait-and-see antibiotic prescription given w/ instructions to begin if not continuing to improve in next 48h  Orders:  -     cefUROXime (CEFTIN) 250 MG tablet; Take 1 tablet by mouth 2 times daily for 7 days, Disp-14 tablet, R-0Normal    -      See orders for this visit as documented in the electronic medical record. - Continue daily saline nasal spray, allergen avoidance where possible. - Discussed antibiotics can affect coagulation times- advised to notify PCP/[facility] if he is taking abx    Return for follow-up in 2 to 4 days if not improving, otherwise as needed. SUBJECTIVE/OBJECTIVE:    Otalgia   There is pain in the right ear. This is a new problem. Episode onset: 4d ago. The problem has been waxing and waning (improved overall at the time of this visit). There has been no fever. The pain is moderate. Associated symptoms include hearing loss (not new). Pertinent negatives include no abdominal pain, coughing, ear discharge, headaches, neck pain, rash, rhinorrhea, sore throat or vomiting. He has tried NSAIDs (+ warm compress to external ear, daily saline nasal spray) for the symptoms. The treatment provided mild relief. His past medical history is significant for hearing loss. Relevant PMH:  bilateral sensorineural hearing loss (), tinnitus (), a-fib on warfarin. Previously followed with [facility] (Dr. Chevy Carbone).       Previous Medications    AMLODIPINE (NORVASC) 5 MG TABLET    TAKE 1 TABLET BY MOUTH TWICE DAILY FOR Acute Care - Occupational Therapy Initial Evaluation  [facility]     Patient Name: Stacia Adkins  : 1943  MRN: [id number]  Today's Date: 2018  Onset of Illness/Injury or Date of Surgery: 18  Date of Referral to OT: 18  Referring Physician: MD Anthony    Admit Date: 2018       ICD-10-CM ICD-9-CM   1. Shortness of breath R06.02 786.05   2. Stress response F43.0 308.9   3. Traumatic rhabdomyolysis, initial encounter (CMS/[facility]) T79.6XXA 958.6   4. Fall in home, initial encounter W19.XXXA E888.9    Y92.009 E849.0   5. Impaired functional mobility, balance, gait, and endurance Z74.09 V49.89   6. Impaired mobility and ADLs Z74.09 799.89     Patient Active Problem List   Diagnosis   • Rhabdomyolysis   • Rheumatoid arthritis involving multiple sites (CMS/[facility])   • Diastolic CHF, chronic (CMS/[facility])   • H/O aortic valve replacement     Past Medical History:   Diagnosis Date   • Arthritis, rheumatoid (CMS/HCC)    • Back pain    • CHF (congestive heart failure) (CMS/[facility])      Past Surgical History:   Procedure Laterality Date   • BACK SURGERY     • CARDIAC VALVE SURGERY     • SHOULDER SURGERY            OT ASSESSMENT FLOWSHEET (last 72 hours)      Occupational Therapy Evaluation     Row Name 18 1358                   OT Evaluation Time/Intention    Subjective Information  complains of;weakness;fatigue;pain  -KF        Document Type  evaluation;therapy note (daily note)  -KF        Mode of Treatment  individual therapy;occupational therapy  -KF        Patient Effort  excellent  -KF        Comment  RN aware and notified of pain R shoulder, Pt stated felt better post mobility   -KF           General Information    Patient Profile Reviewed?  yes  -KF        Onset of Illness/Injury or Date of Surgery  18  -KF        Referring Physician  MD Anthony  -KF        Patient Observations  alert;cooperative;agree to therapy  -KF        Patient/Family Observations  no family present, exit alarm  -KF         General Observations of Patient  tele, RA, exit alarm pre and post, RN cleared, Pt agreeable, alert   -KF        Prior Level of Function  independent:;all household mobility;community mobility;gait;transfer;ADL's;home management;using stairs  -KF        Equipment Currently Used at Home  none  -KF        Pertinent History of Current Functional Problem  76 yo female hx of RA, R shoulder and back pain, CHF presents to ED via EMS due to SOA weakness with recent fall at home in which head was struck down for hours presented to PCP which determine abnormal labs indicating rhabdo, later became weak   -KF        Existing Precautions/Restrictions  fall  -KF        Risks Reviewed  patient:;LOB;nausea/vomiting;dizziness;increased discomfort  -KF        Benefits Reviewed  patient:;improve function;increase independence;increase strength;increase balance;decrease pain;increase knowledge  -KF        Barriers to Rehab  none identified  -KF           Relationship/Environment    Primary Source of Support/Comfort  child(erik)  -KF        Lives With  alone  -KF        Family Caregiver if Needed  none  -KF        Primary Roles/Responsibilities  caregiver for other(s);wage earner, part time  -KF        Concerns About Impact on Relationships  Pt states cares for others, very active, children live out of town, part time caregiver at [employer]  -KF           Resource/Environmental Concerns    Current Living Arrangements  home/apartment/condo  -KF           Home Main Entrance    Number of Stairs, Main Entrance  three  -KF        Stair Railings, Main Entrance  railings on both sides of stairs  -KF           Cognitive Assessment/Interventions    Additional Documentation  Cognitive Assessment/Intervention (Group)  -KF           Cognitive Assessment/Intervention- PT/OT    Affect/Mental Status (Cognitive)  WFL  -KF        Orientation Status (Cognition)  oriented x 4  -KF        Follows Commands (Cognition)  WFL;follows one step  90 DAYS    ASPIRIN 81 MG CHEWABLE TABLET    Take 81 mg by mouth daily    COVID-19 MRNA VACC, PFIZER, 30 MCG/0.3ML SUSP INJECTION    Inject into the muscle once Last dose March    CPAP MACHINE MISC    by Does not apply route Indications: 7 cm with sleep 6-8 hours    CPAP MACHINE MISC    by NOT APPLICABLE route    DIGOXIN PO    Take 1 tablet by mouth    LOSARTAN-HYDROCHLOROTHIAZIDE (HYZAAR) 50-12.5 MG PER TABLET    TAKE 1 TABLET BY MOUTH ONCE DAILY FOR 90 DAYS    MAGNESIUM PO    Take by mouth    PRAVASTATIN (PRAVACHOL) 10 MG TABLET    Take 10 mg by mouth daily    RESPIRATORY THERAPY SUPPLIES MASSIEL    New CPAP mask and supplies    TAMSULOSIN (FLOMAX) 0.4 MG CAPSULE    Take 0.4 mg by mouth daily    VERAPAMIL (VERELAN) 120 MG EXTENDED RELEASE CAPSULE    Take by mouth    WARFARIN (COUMADIN) 5 MG TABLET    Take as directed by [facility]. Quantity for 90 day supply. Allergies   Allergen Reactions    Pcn [Penicillins] Swelling    Sulfa Antibiotics Swelling      Review of Systems   Constitutional: Negative for chills, fever and unexpected weight change. HENT: Positive for congestion, ear pain, hearing loss (not new), sneezing and tinnitus (baseline). Negative for ear discharge, mouth sores, rhinorrhea and sore throat. Eyes: Negative for pain and redness. Respiratory: Negative for cough and shortness of breath. Cardiovascular: Negative for chest pain. Gastrointestinal: Negative for abdominal pain, nausea and vomiting. Musculoskeletal: Negative for myalgias and neck pain. Skin: Negative for rash. Neurological: Negative for dizziness, weakness, light-headedness and headaches. Hematological: Bruises/bleeds easily (warfarin).      Vitals:  Vitals:    08/31/21 1151   BP: 118/60   Site: Right Upper Arm   Position: Sitting   Cuff Size: Medium Adult   Pulse: 71   Temp: 97.5 °F (36.4 °C)   TempSrc: Temporal   SpO2: 97%   Weight: 192 lb (87.1 kg)   Height: 5' 10\" (1.778 m) Physical Exam  Vitals and nursing note reviewed. Constitutional:       General: He is not in acute distress. Appearance: He is well-groomed. He is not toxic-appearing. HENT:      Head: Normocephalic and atraumatic. Right Ear: External ear normal. Tenderness present. A middle ear effusion (air-fluid level visible behind TM; serous fluid, non-purulent.) is present. No mastoid tenderness. Tympanic membrane is erythematous (dull). Tympanic membrane is not bulging. Left Ear: Tympanic membrane, ear canal and external ear normal. No tenderness. No mastoid tenderness. Nose: Mucosal edema and congestion present. No nasal tenderness. Mouth/Throat:      Lips: Pink. No lesions. Mouth: Mucous membranes are moist. No oral lesions. Pharynx: Oropharynx is clear. Uvula midline. No oropharyngeal exudate or posterior oropharyngeal erythema. Eyes:      General: Lids are normal.      Extraocular Movements: Extraocular movements intact. Conjunctiva/sclera: Conjunctivae normal.      Pupils: Pupils are equal, round, and reactive to light. Neck:      Trachea: Trachea and phonation normal.   Cardiovascular:      Rate and Rhythm: Normal rate and regular rhythm. Heart sounds: Normal heart sounds. No murmur heard. No friction rub. No gallop. Pulmonary:      Effort: Pulmonary effort is normal. No tachypnea. Breath sounds: Normal breath sounds and air entry. Musculoskeletal:         General: Normal range of motion. Cervical back: Normal range of motion and neck supple. Lymphadenopathy:      Cervical: No cervical adenopathy. Skin:     General: Skin is warm and dry. Capillary Refill: Capillary refill takes less than 2 seconds. Findings: No rash. Neurological:      General: No focal deficit present. Mental Status: He is alert. Mental status is at baseline. Gait: Gait is intact.    Psychiatric:         Mood and Affect: Mood and affect normal. commands;over 90% accuracy  -KF        Cognitive Function (Cognitive)  safety deficit  -KF        Safety Deficit (Cognitive)  mild deficit  -KF        Cognitive Interventions (Cognitive)  occupation/activity based interventions;process/task specific training  -KF        Personal Safety Interventions  fall prevention program maintained;gait belt;muscle strengthening facilitated;nonskid shoes/slippers when out of bed  -KF           Safety Issues, Functional Mobility    Safety Issues Affecting Function (Mobility)  safety precaution awareness;insight into deficits/self awareness  -KF        Impairments Affecting Function (Mobility)  balance;strength;pain  -KF        Comment, Safety Issues/Impairments (Mobility)  pain in R shoulder did not place weight down, held neck in flexion at times because of pain   -KF           Bed Mobility Assessment/Treatment    Comment (Bed Mobility)  UIC  -KF           Functional Mobility    Functional Mobility- Ind. Level  contact guard assist;verbal cues required  -KF        Functional Mobility- Device  other (see comments) gait belt HHA  -KF        Functional Mobility-Distance (Feet)  120  -KF        Functional Mobility- Safety Issues  step length decreased  -KF           Transfer Assessment/Treatment    Transfer Assessment/Treatment  sit-stand transfer;stand-sit transfer  -KF        Comment (Transfers)  forward flexion into standing, no assist required, SBA for balance  -KF           Sit-Stand Transfer    Sit-Stand [place] (Transfers)  verbal cues;stand by assist  -KF           Stand-Sit Transfer    Stand-Sit [place] (Transfers)  supervision  -KF           ADL Assessment/Intervention    BADL Assessment/Intervention  lower body dressing;grooming  -KF           Lower Body Dressing Assessment/Training    Lower Body Dressing [place] Level  doff;don;shoes/slippers;socks;independent  -KF        Lower Body Dressing Position  unsupported sitting  -KF        Comment (Lower Body  Dressing)  able to doff/don shoes and socks as well as tie and untie cross LE technique no LOB and good safety  -KF           Grooming Assessment/Training    [place] Level (Grooming)  oral care regimen;wash face, hands;hair care, combing/brushing;supervision;verbal cues  -KF        Grooming Position  sink side;unsupported standing  -KF        Comment (Grooming)  supervision for safe placement of lines and safety awareness   -KF           BADL Safety/Performance    Impairments, BADL Safety/Performance  balance;strength;pain  -KF        Skilled BADL Treatment/Intervention  BADL process/adaptation training;cognitive/safety deficit modifications  -KF        Progress in BADL Status  improvement noted  -KF           General ROM    GENERAL ROM COMMENTS  R UE limited shoulder flexion due to pain less than 90 degrees, L UE AROM WFL  -KF           MMT (Manual Muscle Testing)    General MMT Comments  R UE MMT deferred due to pain; LUE 5/5  -KF           Motor Assessment/Interventions    Additional Documentation  Balance (Group);Balance Interventions (Group);Fine Motor Testing & Training (Group)  -KF           Balance    Balance  static sitting balance;static standing balance;dynamic sitting balance;dynamic standing balance  -KF           Static Sitting Balance    Level of [place] (Unsupported Sitting, Static Balance)  independent  -KF        Sitting Position (Unsupported Sitting, Static Balance)  sitting in chair  -KF        Time Able to Maintain Position (Unsupported Sitting, Static Balance)  more than 5 minutes  -KF           Dynamic Sitting Balance    Level of [place], Reaches Outside Midline (Sitting, Dynamic Balance)  supervision  -KF        Sitting Position, Reaches Outside Midline (Sitting, Dynamic Balance)  sitting in chair  -KF        Comment, Reaches Outside Midline (Sitting, Dynamic Balance)  no LOB able to cross LEs for LB dressing  -KF           Static Standing Balance    Level of [place]  Speech: Speech normal.       Antibiotic Instructions: Complete the full course of antibiotics as ordered. Take each dose with a small snack or meal to lessen potential GI upset. To prevent antibiotic resistance, please take medication as ordered and for the full duration even if you start to feel better. Consider intake of yogurt or probiotic during antibiotic use and for a few days after to help reduce the risk of developing a secondary infection. Separate the yogurt and antibiotic by at least 1 hour. Avoid alcohol while taking antibiotics. Notify PCP or anticoagulation prescriber when taking antibiotics as they could affect clotting times. Side effects and adverse effects of any medication prescribed today, as well as treatment plan/rationale, follow-up care, and result expectations have been discussed with the patient. Sandra Chauhan expresses understanding and wishes to proceed with the plan. Discussed signs and symptoms which require immediate follow-up in ED/call to 911. Understanding verbalized. I have reviewed and updated the electronic medical record.     Electronically signed by JAMES Mack CNP on 8/31/2021 at 12:44 PM (Supported Standing, Static Balance)  standby assist  -KF        Time Able to Maintain Position (Supported Standing, Static Balance)  3 to 4 minutes  -KF           Dynamic Standing Balance    Level of [place], Reaches Outside Midline (Standing, Dynamic Balance)  contact guard assist  -KF        Time Able to Maintain Position, Reaches Outside Midline (Standing, Dynamic Balance)  3 to 4 minutes  -KF           Fine Motor Testing & Training    Training Activity, Fine Motor Coordination  manipulation of grooming tools  -KF        Comment, Fine Motor Coordination  fine motor WFL   -KF           Sensory Assessment/Intervention    Sensory General Assessment  no sensation deficits identified  -KF           Positioning and Restraints    Pre-Treatment Position  sitting in chair/recliner  -KF        Post Treatment Position  chair  -KF        In Chair  notified nsg;reclined;sitting;call light within reach;encouraged to call for assist;exit alarm on;waffle cushion;legs elevated;with other staff  -KF           Pain Assessment    Additional Documentation  Pain Scale: Numbers Pre/Post-Treatment (Group)  -KF           Pain Scale: Numbers Pre/Post-Treatment    Pain Scale: Numbers, Pretreatment  8/10  -KF        Pain Scale: Numbers, Post-Treatment  8/10  -KF        Pain Location - Side  Bilateral  -KF        Pain Location - Orientation  posterior  -KF        Pain Location  neck  -KF        Pain Intervention(s)  Ambulation/increased activity;Repositioned  -KF           Plan of Care Review    Plan of Care Reviewed With  patient  -KF           Clinical Impression (OT)    Date of Referral to OT  12/13/18  -KF        OT Diagnosis  ADL decline  -KF        Patient/Family Goals Statement (OT Eval)  PLOF  -KF        Criteria for Skilled Therapeutic Interventions Met (OT Eval)  yes;treatment indicated  -KF        Rehab Potential (OT Eval)  good, to achieve stated therapy goals  -KF        Therapy Frequency (OT Eval)  daily  -KF        Care  Plan Review (OT)  evaluation/treatment results reviewed;care plan/treatment goals reviewed;risks/benefits reviewed;current/potential barriers reviewed;patient/other agree to care plan  -KF        Anticipated Equipment Needs at Discharge (OT)  -- TBD  -KF        Anticipated Discharge Disposition (OT)  home with home health  -KF           Vital Signs    Pre Systolic BP Rehab  119  -KF        Pre Treatment Diastolic BP  72 RN cleared , vitals stable no dizziness  -KF        Pretreatment Heart Rate (beats/min)  77  -KF        Pre SpO2 (%)  99  -KF        O2 Delivery Pre Treatment  room air  -KF        Intra SpO2 (%)  96  -KF        O2 Delivery Intra Treatment  room air  -KF        Post SpO2 (%)  98  -KF        O2 Delivery Post Treatment  room air  -KF        Pre Patient Position  Sitting  -KF        Intra Patient Position  Standing  -KF        Post Patient Position  Sitting  -KF           Planned OT Interventions    Planned Therapy Interventions (OT Eval)  activity tolerance training;adaptive equipment training;BADL retraining;cognitive/visual perception retraining;functional balance retraining;neuromuscular control/coordination retraining;occupation/activity based interventions;patient/caregiver education/training;ROM/therapeutic exercise;strengthening exercise;transfer/mobility retraining  -KF           OT Goals    Bed Mobility Goal Selection (OT)  bed mobility, OT goal 1  -KF        Transfer Goal Selection (OT)  transfer, OT goal 1  -KF        Dressing Goal Selection (OT)  --  -KF        Toileting Goal Selection (OT)  toileting, OT goal 1  -KF           Bed Mobility Goal 1 (OT)    Activity/Assistive Device (Bed Mobility Goal 1, OT)  sit to supine/supine to sit  -KF        [place] Level/Cues Needed (Bed Mobility Goal 1, OT)  conditional independence  -KF        Time Frame (Bed Mobility Goal 1, OT)  long term goal (LTG);by discharge  -KF        Progress/Outcomes (Bed Mobility Goal 1, OT)  goal ongoing  -KF            Transfer Goal 1 (OT)    Activity/Assistive Device (Transfer Goal 1, OT)  commode, bedside without drop arms;sit-to-stand/stand-to-sit  -KF        [place] Level/Cues Needed (Transfer Goal 1, OT)  conditional independence  -KF        Time Frame (Transfer Goal 1, OT)  long term goal (LTG);by discharge  -KF        Progress/Outcome (Transfer Goal 1, OT)  goal ongoing  -KF           Toileting Goal 1 (OT)    Activity/Device (Toileting Goal 1, OT)  adjust/manage clothing;perform perineal hygiene;commode, bedside without drop arms  -KF        [place] Level/Cues Needed (Toileting Goal 1, OT)  conditional independence  -KF        Time Frame (Toileting Goal 1, OT)  long term goal (LTG);by discharge  -KF        Progress/Outcome (Toileting Goal 1, OT)  goal ongoing  -KF           Living Environment    Home Accessibility  stairs to enter home  -KF          User Key  (r) = Recorded By, (t) = Taken By, (c) = Cosigned By    Initials Name Effective Dates    Khloe Diehl, OT 04/03/18 -          Occupational Therapy Education     Title: PT OT SLP Therapies (In Progress)     Topic: Occupational Therapy (Done)     Point: ADL training (Done)     Description: Instruct learner(s) on proper safety adaptation and remediation techniques during self care or transfers.   Instruct in proper use of assistive devices.    Learning Progress Summary           Patient Acceptance, E, SHIVA,DU by EDUARDA at 12/14/2018  1:58 PM    Comment:  Educated on purpose of skilled OT services, safety awareness in room and use of IV pole, safety with lines                   Point: Home exercise program (Done)     Description: Instruct learner(s) on appropriate technique for monitoring, assisting and/or progressing therapeutic exercises/activities.    Learning Progress Summary           Patient Acceptance, E, VU,DU by EDUARDA at 12/14/2018  1:58 PM    Comment:  Educated on purpose of skilled OT services, safety awareness in room and use of IV pole, safety  with lines                   Point: Precautions (Done)     Description: Instruct learner(s) on prescribed precautions during self-care and functional transfers.    Learning Progress Summary           Patient Acceptance, E, VU,DU by KF at 12/14/2018  1:58 PM    Comment:  Educated on purpose of skilled OT services, safety awareness in room and use of IV pole, safety with lines                   Point: Body mechanics (Done)     Description: Instruct learner(s) on proper positioning and spine alignment during self-care, functional mobility activities and/or exercises.    Learning Progress Summary           Patient Acceptance, E, VU,DU by KF at 12/14/2018  1:58 PM    Comment:  Educated on purpose of skilled OT services, safety awareness in room and use of IV pole, safety with lines                               User Key     Initials Effective Dates Name Provider Type Discipline     04/03/18 -  Khloe Neil, OT Occupational Therapist OT                  OT Recommendation and Plan  Outcome Summary/Treatment Plan (OT)  Anticipated Equipment Needs at Discharge (OT): (TBD)  Anticipated Discharge Disposition (OT): home with home health  Planned Therapy Interventions (OT Eval): activity tolerance training, adaptive equipment training, BADL retraining, cognitive/visual perception retraining, functional balance retraining, neuromuscular control/coordination retraining, occupation/activity based interventions, patient/caregiver education/training, ROM/therapeutic exercise, strengthening exercise, transfer/mobility retraining  Therapy Frequency (OT Eval): daily  Plan of Care Review  Plan of Care Reviewed With: patient  Plan of Care Reviewed With: patient  Outcome Summary: OT eval completed Pt presents with weakness, pain, and difficulty with functional transfers and mobility limiting ADL completion compared to baseline. 120 ft functional mob with HHA required CGA no LOB. Cont IPOT per POC    Outcome Measures     Row Name  12/14/18 1358 12/14/18 0820          How much help from another person do you currently need...    Turning from your back to your side while in flat bed without using bedrails?  --  4  -SJ     Moving from lying on back to sitting on the side of a flat bed without bedrails?  --  3  -SJ     Moving to and from a bed to a chair (including a wheelchair)?  --  3  -SJ     Standing up from a chair using your arms (e.g., wheelchair, bedside chair)?  --  4  -SJ     Climbing 3-5 steps with a railing?  --  3  -SJ     To walk in hospital room?  --  3  -SJ     AM-PAC 6 Clicks Score  --  20  -SJ        How much help from another is currently needed...    Putting on and taking off regular lower body clothing?  3  -KF  --     Bathing (including washing, rinsing, and drying)  3  -KF  --     Toileting (which includes using toilet bed pan or urinal)  3  -KF  --     Putting on and taking off regular upper body clothing  4  -KF  --     Taking care of personal grooming (such as brushing teeth)  4  -KF  --     Eating meals  4  -KF  --     Score  21  -KF  --        Functional Assessment    Outcome Measure Options  AM-PAC 6 Clicks Daily Activity (OT)  -  AM-[facility] 6 Clicks Basic Mobility (PT)  -       User Key  (r) = Recorded By, (t) = Taken By, (c) = Cosigned By    Initials Name Provider Type    Romana Short, PT Physical Therapist    Khloe Diehl OT Occupational Therapist          Time Calculation:   Time Calculation- OT     Row Name 12/14/18 1358             Time Calculation- OT    OT Start Time  1358  -KF      Total Timed Code Minutes- OT  10 minute(s)  -KF      OT Received On  12/14/18  -KF      OT Goal Re-Cert Due Date  12/24/18  -KF         Timed Charges    84219 - OT Therapeutic Activity Minutes  10  -KF        User Key  (r) = Recorded By, (t) = Taken By, (c) = Cosigned By    Initials Name Provider Type    Khloe Diehl OT Occupational Therapist        Therapy Suggested Charges     Code   Minutes Charges     64334 (CPT®) Hc Ot Neuromusc Re Education Ea 15 Min      37151 (CPT®) Hc Ot Ther Proc Ea 15 Min      92762 (CPT®) Hc Ot Therapeutic Act Ea 15 Min 10 1    97374 (CPT®) Hc Ot Manual Therapy Ea 15 Min      66216 (CPT®) Hc Ot Iontophoresis Ea 15 Min      30235 (CPT®) Hc Ot Elec Stim Ea-Per 15 Min      17929 (CPT®) Hc Ot Ultrasound Ea 15 Min      39893 (CPT®) Hc Ot Self Care/Mgmt/Train Ea 15 Min      Total  10 1        Therapy Charges for Today     Code Description Service Date Service Provider Modifiers Qty    99278410064 HC OT SELFCARE CURRENT 12/14/2018 Khloe Neil, OT GO, CJ 1    66993510593 HC OT SELFCARE PROJECTED 12/14/2018 Khloe Neil, OT GO, CI 1    86564770944 HC OT EVAL MOD COMPLEXITY 3 12/14/2018 Khloe Neil, OT GO 1    88211333119 HC OT THERAPEUTIC ACT EA 15 MIN 12/14/2018 Khloe Neil, OT GO 1          OT G-codes  OT Professional Judgement Used?: Yes  OT Functional Scales Options: AM-PAC 6 Clicks Daily Activity (OT)  Functional Assessment Tool Used: 6 clicks  Score: 21  Functional Limitation: Self care  Self Care Current Status (): At least 20 percent but less than 40 percent impaired, limited or restricted  Self Care Goal Status (): At least 1 percent but less than 20 percent impaired, limited or restricted    Khloe Neil OT  12/14/2018

## 2021-08-31 NOTE — PATIENT INSTRUCTIONS
Prescription for antibiotic was sent to your pharmacy. Begin the antibiotics if your ear pain does not continue to improve over the next 1 to 2 days. Continue daily saline nasal spray. Avoid any known allergens. Follow-up if not improving in the next 2 to 4 days, sooner if worsening in any way. Patient Education        Middle Ear Fluid: Care Instructions  Your Care Instructions     Fluid often builds up inside the ear during a cold or allergies. Usually the fluid drains away, but sometimes a small tube in the ear, called the eustachian tube, stays blocked for months. Symptoms of fluid buildup may include:  · Popping, ringing, or a feeling of fullness or pressure in the ear. · Trouble hearing. · Balance problems and dizziness. In most cases, you can treat yourself at home. Follow-up care is a key part of your treatment and safety. Be sure to make and go to all appointments, and call your doctor if you are having problems. It's also a good idea to know your test results and keep a list of the medicines you take. How can you care for yourself at home? · In most cases, the fluid clears up within a few months without treatment. You may need more tests if the fluid does not clear up after 3 months. · If your doctor prescribed antibiotics, take them as directed. Do not stop taking them just because you feel better. You need to take the full course of antibiotics. When should you call for help? Call your doctor now or seek immediate medical care if:    · You have symptoms of infection, such as:  ? Increased pain, swelling, warmth, or redness. ? Pus draining from the area. ? A fever. Watch closely for changes in your health, and be sure to contact your doctor if:    · You notice changes in hearing.     · You do not get better as expected. Where can you learn more? Go to [email or web address].[employer]. org and sign in to your [employer] account.  Enter S180 in the [employer] box to learn more about \"Middle Ear Fluid: Care Instructions. \"     If you do not have an account, please click on the \"Sign Up Now\" link. Current as of: December 2, 2020               Content Version: 12.9  © 2006-2021 Healthwise, Incorporated. Care instructions adapted under license by [facility] If you have questions about a medical condition or this instruction, always ask your healthcare professional. [address] any warranty or liability for your use of this information.

## 2021-09-27 ASSESSMENT — ENCOUNTER SYMPTOMS
EYE PAIN: 0
NAUSEA: 0
VOMITING: 0
EYE REDNESS: 0
ABDOMINAL PAIN: 0

## 2021-09-28 ENCOUNTER — OFFICE VISIT (OUTPATIENT)
Dept: PULMONOLOGY | Age: 86
End: 2021-09-28
Payer: MEDICARE

## 2021-09-28 VITALS
SYSTOLIC BLOOD PRESSURE: 121 MMHG | BODY MASS INDEX: 27.35 KG/M2 | HEIGHT: 70 IN | TEMPERATURE: 97.9 F | WEIGHT: 191 LBS | DIASTOLIC BLOOD PRESSURE: 72 MMHG | HEART RATE: 87 BPM | OXYGEN SATURATION: 98 %

## 2021-09-28 DIAGNOSIS — E66.3 OVERWEIGHT (BMI 25.0-29.9): ICD-10-CM

## 2021-09-28 DIAGNOSIS — G47.33 OSA ON CPAP: Primary | ICD-10-CM

## 2021-09-28 DIAGNOSIS — Z99.89 OSA ON CPAP: Primary | ICD-10-CM

## 2021-09-28 PROCEDURE — 99214 OFFICE O/P EST MOD 30 MIN: CPT | Performed by: INTERNAL MEDICINE

## 2021-09-28 ASSESSMENT — ENCOUNTER SYMPTOMS
COUGH: 0
NAUSEA: 0
WHEEZING: 0
VOMITING: 0
RHINORRHEA: 0
DIARRHEA: 0
SORE THROAT: 0
SHORTNESS OF BREATH: 0
VOICE CHANGE: 0
EYE ITCHING: 0
ABDOMINAL PAIN: 0
CHEST TIGHTNESS: 0

## 2021-09-28 NOTE — PROGRESS NOTES
Subjective:     Zoila Mccrary is a 82 y.o. male who complains today of:     Chief Complaint   Patient presents with    Sleep Apnea     1 year f/u       HPI  He is using CPAP with   7 centimeters of H2O with heated humidity. His CPAP is more than 11 yrs old. CPAP is working good. He is using CPAP for about 8   hours every night. He is using CPAP with  Nasal  Mask. He said  sleep is restful with the CPAP use. He is compliant with CPAP therapy and benefiting with CPAP use. No snoring with CPAP use. No complaint of daytime sleepiness or tiredness with CPAP use. He denies taking naps. No sleepiness with driving. He denies difficulty falling asleep or staying asleep.       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    Tinnitus 06/28/2010     Past Surgical History:   Procedure Laterality Date    CATARACT REMOVAL  2011     Family History   Problem Relation Age of Onset    Heart Disease Father      Social History     Socioeconomic History    Marital status:      Spouse name: Not on file    Number of children: Not on file    Years of education: Not on file    Highest education level: Not on file   Occupational History    Not on file   Tobacco Use    Smoking status: Never Smoker    Smokeless tobacco: Never Used   Substance and Sexual Activity    Alcohol use: Not on file    Drug use: Not on file    Sexual activity: Not on file   Other Topics Concern    Not on file   Social History Narrative    Not on file     Social Determinants of Health     Financial Resource Strain: Low Risk     Difficulty of Paying Living Expenses: Not hard at all   Food Insecurity: No Food Insecurity    Worried About Running Out of Food in the Last Year: Never true    Andrade of Food in the Last Year: Never true   Transportation Needs:     Lack of Transportation (Medical):  Lack of Transportation (Non-Medical):    Physical Activity:     Days of Exercise per Week:     Minutes of Exercise per Session:    Stress:     Feeling of Stress :    Social Connections:     Frequency of Communication with Friends and Family:     Frequency of Social Gatherings with Friends and Family:     Attends Taoism Services:     Active Member of Clubs or Organizations:     Attends Club or Organization Meetings:     Marital Status:    Intimate Partner Violence:     Fear of Current or Ex-Partner:     Emotionally Abused:     Physically Abused:     Sexually Abused:          Review of Systems   Constitutional: Negative for chills, diaphoresis, fatigue and fever. HENT: Negative for congestion, mouth sores, nosebleeds, postnasal drip, rhinorrhea, sneezing, sore throat and voice change. Eyes: Negative for itching and visual disturbance. Respiratory: Negative for cough, chest tightness, shortness of breath and wheezing. Cardiovascular: Negative. Negative for chest pain, palpitations and leg swelling. Gastrointestinal: Negative for abdominal pain, diarrhea, nausea and vomiting. Genitourinary: Negative for difficulty urinating and hematuria. Musculoskeletal: Negative for arthralgias, joint swelling and myalgias. Skin: Negative for rash. Allergic/Immunologic: Negative for environmental allergies. Neurological: Negative for dizziness, tremors, weakness and headaches. Psychiatric/Behavioral: Positive for sleep disturbance. Negative for behavioral problems. :     Vitals:    09/28/21 0831   BP: 121/72   Pulse: 87   Temp: 97.9 °F (36.6 °C)   SpO2: 98%   Weight: 191 lb (86.6 kg)   Height: 5' 10\" (1.778 m)     Wt Readings from Last 3 Encounters:   09/28/21 191 lb (86.6 kg)   08/31/21 192 lb (87.1 kg)   09/28/20 192 lb (87.1 kg)         Physical Exam  Constitutional:       Appearance: He is well-developed.    HENT:      Head: Normocephalic and atraumatic. Nose: Nose normal.   Eyes:      Conjunctiva/sclera: Conjunctivae normal.      Pupils: Pupils are equal, round, and reactive to light. Neck:      Thyroid: No thyromegaly. Vascular: No JVD. Trachea: No tracheal deviation. Cardiovascular:      Rate and Rhythm: Normal rate and regular rhythm. Heart sounds: No murmur heard. No friction rub. No gallop. Pulmonary:      Effort: Pulmonary effort is normal. No respiratory distress. Breath sounds: Normal breath sounds. No wheezing or rales. Chest:      Chest wall: No tenderness. Abdominal:      General: There is no distension. Musculoskeletal:         General: Normal range of motion. Lymphadenopathy:      Cervical: No cervical adenopathy. Skin:     General: Skin is warm and dry. Findings: No rash. Neurological:      Mental Status: He is alert and oriented to person, place, and time. Cranial Nerves: No cranial nerve deficit. Psychiatric:         Behavior: Behavior normal.         Current Outpatient Medications   Medication Sig Dispense Refill    CPAP Machine MISC by NOT APPLICABLE route      losartan-hydroCHLOROthiazide (HYZAAR) 50-12.5 MG per tablet TAKE 1 TABLET BY MOUTH ONCE DAILY FOR 90 DAYS      warfarin (COUMADIN) 5 MG tablet Take as directed by [facility] [place] [facility] [place] [facility]. Quantity for 90 day supply.  100 tablet 1    COVID-19 mRNA Vacc, PFIZER, 30 MCG/0.3ML SUSP injection Inject into the muscle once Last dose March      amLODIPine (NORVASC) 5 MG tablet TAKE 1 TABLET BY MOUTH TWICE DAILY FOR 90 DAYS      DIGOXIN PO Take 1 tablet by mouth      MAGNESIUM PO Take by mouth      aspirin 81 MG chewable tablet Take 81 mg by mouth daily      Respiratory Therapy Supplies MASSIEL New CPAP mask and supplies 1 Device 0    tamsulosin (FLOMAX) 0.4 MG capsule Take 0.4 mg by mouth daily      verapamil (VERELAN) 120 MG extended release capsule Take 180 mg by mouth       CPAP Machine MISC by Does not apply route Indications: 7 cm with sleep 6-8 hours      pravastatin (PRAVACHOL) 10 MG tablet Take 10 mg by mouth daily       No current facility-administered medications for this visit. Results for orders placed during the hospital encounter of 11/07/14    XR Chest Standard TWO VW    Narrative  EXAMINATION CHEST TWO VIEWS    CLINICAL HISTORY Shortness of breath    COMPARISONS 5/8/09    FINDINGS Heart, mediastinum, and hilum are unremarkable. Dense  calcified granuloma of the left lower lobe is again noted. Lungs are  clear. Pulmonary vasculature is normal.  No effusions are seen. Bones  are intact. IMPRESSION NO ACTIVE CHEST DISEASE. Cortney Bowen M.D. Released Elkin Bowen M.D. Released Date Time- 11/07/14 0845  This document has been electronically signed. ------------------------------------------------------------------------------    Assessment/Plan:     1. OSVALDO on CPAP  He is using CPAP with   7 centimeters of H2O with heated humidity. His CPAP is more than 11 yrs old. CPAP is working good. He is using CPAP for about 8   hours every night. He is using CPAP with  Nasal  Mask. He said  sleep is restful with the CPAP use. He is compliant with CPAP therapy and benefiting with CPAP use. No snoring with CPAP use. Counseling: CPAP/BiPAP uses, He advised to use CPAP at least 5-6 hours every night. Driving: He is advised for extreme caution when driving or operating machinery if there is a feeling of drowsiness, especially while driving it is preferable to stop driving and take a brief nap. Sleep hygiene:Avoid supine sleep, sleep on  sides. Avoid  sleep deprivation. Explained sleep hygiene. Advice to avoid Alcohol and sedative    Time spend over 30 min. Face to face. with greater than 50 % time with CPAP therapy including review compliance, counseling and advised regarding CPAP therapy. 2. Overweight (BMI 25.0-29. 9)  He is advised try to lose weight. obesity related risk explained to the patient ,  Current weight:  191 lb (86.6 kg) Lbs. BMI:  Body mass index is 27.41 kg/m². Suggested weight control approaches, including dietary changes , exercise, behavioral modification. Return in about 1 year (around 9/28/2022) for jasper.       Sandra Mead MD

## 2021-10-07 ENCOUNTER — HOSPITAL ENCOUNTER (OUTPATIENT)
Dept: PHARMACY | Age: 86
Setting detail: THERAPIES SERIES
Discharge: HOME | End: 2021-10-07
Payer: MEDICARE

## 2021-10-07 DIAGNOSIS — I48.91 ATRIAL FIBRILLATION, UNSPECIFIED TYPE (HCC): Primary | ICD-10-CM

## 2021-10-07 LAB — INTERNATIONAL NORMALIZATION RATIO, POC: 2

## 2021-10-07 PROCEDURE — 99211 OFF/OP EST MAY X REQ PHY/QHP: CPT

## 2021-10-07 PROCEDURE — 85610 PROTHROMBIN TIME: CPT

## 2021-10-07 NOTE — PROGRESS NOTES
Mr. Bryan Bill is a 82 y.o. y/o male with history of Afib who presents today for anticoagulation monitoring and adjustment.   INR 2 is therapeutic for this patient (goal range 2-3) and is reflective of 35 mg TWD  Patient verifies current dosing regimen, patient able to verbally recall dose  Patient reports no  missed doses since last INR   Patient denies s/sx clotting and/or stroke  Patient denies hematuria, epistaxis, rectal bleeding  Patient denies changes in diet, alcohol, or tobacco use  Reviewed medication list and drug allergies with patient, updated any medication additions or modifications accordingly  Patient also denies any pending medical or dental procedures scheduled at this time  Patient was instructed to continue current regimen of 35 mg TWD and RTC 6 weeks    For Pharmacy Admin Tracking Only     Intervention Detail: Lab(s) Ordered   Total # of Interventions Recommended: 1   Total # of Interventions Accepted: 1   Time Spent (min): 30    Gertrude RamirezD, [facility], [facility]  Staff Pharmacist  10/7/2021 8:39 AM

## 2021-10-31 NOTE — PROGRESS NOTES
Mr. Geovanny Quinones is a [de-identified] y.o. y/o male with history of Afib who presents today for anticoagulation monitoring and adjustment. INR 3.4 is supratherapeutic for this patient (goal range 2-3) and is reflective of 42.5 mg TWD  Patient verifies current dosing regimen, patient able to verbally recall dose  Patient reports no  missed doses since last INR   Patient denies s/sx clotting and/or stroke  Patient denies hematuria, epistaxis, rectal bleeding  Patient denies changes in diet, alcohol, or tobacco use  Reviewed medication list and drug allergies with patient, updated any medication additions or modifications accordingly  Patient also denies any pending medical or dental procedures scheduled at this time  Patient was instructed to skip dose today then take usual 42.5mg TWD and RTC 6 weeks. Pt advised that if trend continues on supratx INR's, may need to adjust dose at next visit. Summarization Of Episode

                             Created on: 10/31/2021



RITA REYES

External Reference #: [id number]

: 1960

Sex: Undifferentiated



Demographics





                          Address                   [address] [place] [address]

 

                          Home Phone                [phone]

 

                          Preferred Language        English

 

                          Marital Status            Unknown

 

                          [employer]aritan Affiliation     Unknown

 

                          Race                      Unknown

 

                          Ethnic Group              Not  or 





Author





                          Author                    [facility]

 

                          Organization              [facility]

 

                          Address                   Unknown

 

                          Phone                     Unavailable







Support





                Name            Relationship    Address         Phone

 

                    SELF EMPLOYED       Next Of Kin         [address] [place] [address]                    [phone]

 

                    DR REYES          Next Of Kin         [address] [place] [address]                    [phone]

 

                    AV REYES  Next Of Kin         [address] [place] [address]                    [phone]

 

                    [place] [address]                    [phone]



                                  



Re-disclosure Warning

          The records that you are about to access may contain information from 
federally-assisted alcohol or drug abuse programs. If such information is 
present, then the following federally mandated warning applies: This information
has been disclosed to you from records protected by federal confidentiality 
rules (42 CFR part 2). The federal rules prohibit you from making any further 
disclosure of this information unless further disclosure is expressly permitted 
by the written consent of the person to whom it pertains or as otherwise 
permitted by 42 CFR part 2. A general authorization for the release of medical 
or other information is NOT sufficient for this purpose. The Federal rules 
restrict any use of the information to criminally investigate or prosecute any 
alcohol or drug abuse patient.The records that you are about to access may 
contain highly sensitive health information, the redisclosure of which is 
protected by Article 27-F of the [facility] Public Health law. If you 
continue you may have access to information: Regarding HIV / AIDS; Provided by 
facilities licensed or operated by the [facility] Office of Mental Health; 
or Provided by the [facility] Office for People With Developmental 
Disabilities. If such information is present, then the following [place] 
mandated warning applies: This information has been disclosed to you from 
confidential records which are protected by state law. State law prohibits you 
from making any further disclosure of this information without the specific 
written consent of the person to whom it pertains, or as otherwise permitted by 
law. Any unauthorized further disclosure in violation of state law may result in
a fine or senior care sentence or both. A general authorization for the release of 
medical or other information is NOT sufficient authorization for further disc
losure.                                                                         
    



Family History

          



             Family Member Name Family Member Gender Family Member Status Date o

f Status 

Description                             Data Source(s)

 

           Unknown    Unknown    Problem                          [facility]



                                                                                
       



Encounters

          



           Encounter  Providers  Location   Date       Indications Data Source(s

)

 

                Outpatient                      [address] [facility] [id number] 10/19/2021 12:00:00 AM

EDT                                                 eCW1 ([facility])

 

                Outpatient                      [address] [facility] [id number] 10/13/2021 12:00:00 AM

EDT                                                 eCW1 ([facility])

 

                Unknown                         [address] [facility] [id number] 10/05/2021 12:00:00 AM 

EDT                                                 eCW1 ([facility])

 

                    TeleMedicine Phone E/M by Phys 5-10 Min                     

[address] [place] [address] 

[phone]          2021 12:00:00 AM EDT                     eCW1 ([facility])

 

                Unknown                         [address] [facility] [id number] 2021 12:00:00 AM 

EDT                                                 eCW1 ([facility])

 

                Unknown                         [address] [facility] [id number] 2021 12:00:00 AM 

EDT                                                 eCW1 ([facility])

 

                Outpatient                      [address] [facility] [id number] 07/15/2021 12:00:00 AM

EDT                                                 eCW1 ([facility])

 

                Unknown                         [address] [facility] [id number] 2021 12:00:00 AM 

EDT                                                 eCW1 ([facility])

 

                Unknown                         [address] [facility] [id number] 2021 12:00:00 AM 

EDT                                                 eCW1 ([facility])

 

                Unknown                         [address] [facility] [address] 2021 12:00:00 AM 

EDT                                                 eCW1 ([facility])

 

                [place]                      [address] [facility] [address] 10/08/2020 12:00:00 AM

EDT                                                 eCW1 ([facility])

 

                [facility]                      [address] [facility] [address] 09/15/2020 12:00:00 AM

EDT                                                 eCW1 ([facility])



                                                                                
                                                                                
                                    



Medications

          



          Medication Brand Name Start Date Product Form Dose      Route     Admi

nistrative 

Instructions Pharmacy Instructions Status     Indications Reaction   Description

 Data 

Source(s)

 

                                        Triamcinolone Acetonide 0.001 MG/MG Topi

brandi Ointment Triamcinolone Acetonide 0.1

%            Triamcinolone Acetonide 0.1 % 10/13/2021 12:00:00 AM EDT           

   1.0 {application}  

                              active                        Triamcinolone Aceton

kiana 0.1 % eCW1 ([facility])

 

                                        Triamcinolone Acetonide 0.001 MG/MG Topi

brandi Ointment Triamcinolone Acetonide 0.1

%            Triamcinolone Acetonide 0.1 % 10/13/2021 12:00:00 AM EDT           

   1.0 {application}  

                              active                        Triamcinolone Aceton

kiana 0.1 % eCW1 ([facility])

 

                    Omeprazole 20 MG Delayed Release Oral Capsule Omeprazole 20 

MG    10/08/2020 

12:00:00 AM EDT                                    suspended               Omepr

azole 20 MG eCW1 ([facility])

 

                    Biotin 1 MG Oral Tablet Biotin 1000 MCG Biotin 1000 MCG     

10/08/2020 12:00:00 AM 

EDT           1.0 {tablet}                      active               Biotin 1000

 MCG eCW1 ([facility])

 

                    Omeprazole 20 MG Delayed Release Oral Capsule Omeprazole 20 

MG    10/08/2020 

12:00:00 AM EDT                                    active               Omeprazo

le 20 MG eCW1 ([facility])

 

                    Omeprazole 20 MG Delayed Release Oral Capsule Omeprazole 20 

MG    10/08/2020 

12:00:00 AM EDT                                    active               Omeprazo

le 20 MG eCW1 ([facility])

 

                    Biotin 1 MG Oral Tablet Biotin 1000 MCG Biotin 1000 MCG     

10/08/2020 12:00:00 AM 

EDT           1.0 {tablet}                      active               Biotin 1000

 MCG eCW1 ([facility])

 

                    Omeprazole 20 MG Delayed Release Oral Capsule Omeprazole 20 

MG    10/08/2020 

12:00:00 AM EDT                                    active               Omeprazo

le 20 MG eCW1 ([facility])

 

                    Biotin 1 MG Oral Tablet Biotin 1000 MCG Biotin 1000 MCG     

10/08/2020 12:00:00 AM 

EDT           1.0 {tablet}                      active               Biotin 1000

 MCG eCW1 ([facility])

 

                    Biotin 1 MG Oral Tablet Biotin 1000 MCG Biotin 1000 MCG     

10/08/2020 12:00:00 AM 

EDT           1.0 {tablet}                      active               Biotin 1000

 MCG eCW1 ([facility])

 

                    Biotin 1 MG Oral Tablet Biotin 1000 MCG Biotin 1000 MCG     

10/08/2020 12:00:00 AM 

EDT           1.0 {tablet}                      active               Biotin 1000

 MCG eCW1 ([facility])

 

                    Biotin 1 MG Oral Tablet Biotin 1000 MCG Biotin 1000 MCG     

10/08/2020 12:00:00 AM 

EDT           1.0 {tablet}                      active               Biotin 1000

 MCG eCW1 ([facility])

 

                    Omeprazole 20 MG Delayed Release Oral Capsule Omeprazole 20 

MG    10/08/2020 

12:00:00 AM EDT                                    active               Omeprazo

le 20 MG eCW1 ([facility])

 

                    Omeprazole 20 MG Delayed Release Oral Capsule Omeprazole 20 

MG    10/08/2020 

12:00:00 AM EDT                                    active               Omeprazo

le 20 MG eCW1 ([facility])

 

                    Biotin 1 MG Oral Tablet Biotin 1000 MCG Biotin 1000 MCG     

10/08/2020 12:00:00 AM 

EDT           1.0 {tablet}                      active               Biotin 1000

 MCG eCW1 ([facility])

 

                    Biotin 1 MG Oral Tablet Biotin 1000 MCG Biotin 1000 MCG     

10/08/2020 12:00:00 AM 

EDT           1.0 {tablet}                      active               Biotin 1000

 MCG eCW1 ([facility])

 

                    Omeprazole 20 MG Delayed Release Oral Capsule Omeprazole 20 

MG    10/08/2020 

12:00:00 AM EDT                                    active               Omeprazo

le 20 MG eCW1 ([facility])

 

                    Biotin 1 MG Oral Tablet Biotin 1000 MCG Biotin 1000 MCG     

10/08/2020 12:00:00 AM 

EDT           1.0 {tablet}                      active               Biotin 1000

 MCG eCW1 ([facility])

 

                    Biotin 1 MG Oral Tablet Biotin 1000 MCG Biotin 1000 MCG     

10/08/2020 12:00:00 AM 

EDT           1.0 {tablet}                      active               Biotin 1000

 MCG eCW1 ([facility])

 

                    Omeprazole 20 MG Delayed Release Oral Capsule Omeprazole 20 

MG    10/08/2020 

12:00:00 AM EDT                                    active               Omeprazo

le 20 MG eCW1 ([facility])

 

                    Omeprazole 20 MG Delayed Release Oral Capsule Omeprazole 20 

MG    10/08/2020 

12:00:00 AM EDT                                    suspended               Omepr

azole 20 MG eCW1 ([facility])

 

                    Omeprazole 20 MG Delayed Release Oral Capsule Omeprazole 20 

MG    10/08/2020 

12:00:00 AM EDT                                    active               Omeprazo

le 20 MG eCW1 ([facility])

 

                    Omeprazole 20 MG Delayed Release Oral Capsule Omeprazole 20 

MG    10/08/2020 

12:00:00 AM EDT                                    active               Omeprazo

le 20 MG eCW1 ([facility])

 

                    Biotin 1 MG Oral Tablet Biotin 1000 MCG Biotin 1000 MCG     

10/08/2020 12:00:00 AM 

EDT           1.0 {tablet}                      active               Biotin 1000

 MCG eCW1 ([facility])



                                                                                
                                                                                
                                                                                
                                                       



Insurance Providers

          



             Payer name   Policy type / Coverage type Policy ID    Covered party

 ID Covered 

party's relationship to steinberg Policy [employer]             Plan Information

 

                              [id number]                               [id number]

[id number]

 

          [facility] HMO/PPO/POS           [id number]           0   

                [id number]

 

          EXCELLUS  H         [id number]           Self                [id number]

[id number]

 

          [employer] [place]           [id number]           SP       

           [id number]

 

          [employer] [place] /[place]           [id number]           SP       

           [id number]

 

          [employer] [place] /[place]           [id number]           SP       

           [id number]

 

          B/S BLUE PPO/HSA (33)           [id number]           1              

     [id number]

 

          MEDICARE            [id number]           SP                  [id number]

T

 

          MEDICARE SECOND PAYER (88)           [id number]           1           

        [id number]

 

          [facility]POS           [id number]           0   

                [id number]

 

          B/S BLUE PPO/HSA (33)           [id number]           1              

     [id number]

 

          EXCELLUS  H         [id number]           Spouse              [id number]

[id number]

 

          [employer] [place] /[place]           [id number]           [id number]     

           [id number]

 

          [employer]BS [place] /307           [id number]           SP       

           [id number]

 

          [employer] [place]/806           [id number]           [id number]      

          [id number]

 

          EXCELLUS BCBS B         [id number]                   [id number]

[id number]

 

          [employer] [place]/806           [id number]           SP        

          [id number]

 

          [employer] [place]/806           [id number]           SP        

          [id number]

 

          [employer] [place]/806           [id number]           [id number]      

          [id number]

 

          BCBS [place] /307           [id number]           SP       

           [id number]

 

          SELF PAY ONLY           UNAVAILABLE           SP                  UNAV

AILABLE

 

          BS [employer] (HMO)           

[id number]     Self                 

 

          SPECIAL ACCOUNT (8)           UNAVAILABLE                             

  UNAVAILABLE

 

          MEDICARE            [id number]           SP                  [id number]

T



                                                                                
                                                                                
                                                                                
                                                                           



Problems, Conditions, and Diagnoses

          



           Code       Display Name Description Problem Type Effective Dates Data

 Source(s)

 

           U07.1      [id number] COVID-19   Problem    10/19/2021 12:00:

00 AM EDT eCW1 

([facility])

 

           L81.4      [id number]  Lentigines Problem    10/13/2021 12:00:00 AM ED

T eCW1 ([facility])

 

           L91.8      [id number]  Skin tag   Problem    10/13/2021 12:00:00 AM ED

T eCW1 ([facility])

 

             L82.0        [id number]    Seborrheic keratoses, inflamed Problem   

   10/13/2021 12:00:00 AM 

EDT                                     eCW1 ([facility])

 

           L82.1      [id number]  Seborrheic keratoses Problem    10/13/2021 12:0

0:00 AM EDT eCW1 

([facility])

 

           D22.9      [id number]  Melanocytic nevus of skin Problem    10/13/2021

 12:00:00 AM EDT 

eCW1 ([facility])

 

           L28.0      [id number]   Lichen simplex chronicus Problem    10/13/2021 

12:00:00 AM EDT eCW1 

([facility])

 

           D18.01     4965704    Cherry angioma Problem    10/13/2021 12:00:00 A

M EDT eCW1 ([facility])

 

           L57.0      958325277  Actinic keratosis Problem    10/13/2021 12:00:0

0 AM EDT eCW1 

([facility])

 

           Z12.83     [id number]  Skin cancer screening Problem    10/13/2021 12:

00:00 AM EDT eCW1 

([facility])

 

             L81.8        5188309      Idiopathic guttate hypomelanosis Problem 

     10/13/2021 12:00:00 AM 

EDT                                     eCW1 ([facility])

 

           Z94.0      [id number]  Renal transplant recipient Problem    10/13/202

1 12:00:00 AM EDT 

eCW1 ([facility])

 

           G47.9      82156506   Sleep disturbance Problem    10/08/2020 12:00:0

0 AM EDT eCW1 

([facility])

 

           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

 12:00:00 AM EDT 

eCW1 ([facility])



                                                                                
                                                                                
                                                                  



Surgeries/Procedures

          No Information                                                        
                     



Results

          



                    ID                  Date                Data Source

 

                    FERRITIN            10/20/2021 12:00:00 AM EDT eCW1 ([facility])









          Name      Value     Range     Interpretation Code Description Data Sondra

rce(s) Supporting 

Document(s)

 

                    48        0-945               FERRITIN  eCW1 ([facility])  









                    ID                  Date                Data Source

 

                    DDIMER QUANT        10/20/2021 12:00:00 AM EDT eCW1 ([facility])









          Name      Value     Range     Interpretation Code Description Data Sondra

rce(s) Supporting 

Document(s)

 

                    1407.80   <500                D-DIMER QUANT eCW1 ([facility])  









                    ID                  Date                Data Source

 

                    C REACTIVE PROTEIN QUANTITATIV ([facility]) 10/20/2021 12:00

:00 AM EDT eCW1 

([facility])









          Name      Value     Range     Interpretation Code Description Data Sondra

rce(s) Supporting 

Document(s)

 

                      0.30       0.00-0.30             C REACTIVE PROTEIN QUANTI

TATIV eCW1 ([facility])                                  









                    ID                  Date                Data Source

 

                    Comprehensive Metabolic Profile (CMP) 10/20/2021 12:00:00 AM

 EDT eCW1 ([facility])









          Name      Value     Range     Interpretation Code Description Data Sondra

rce(s) Supporting 

Document(s)

 

                    18        7-18                BLOOD UREA NITROGEN eCW1 ([facility])  

 

                    1.12      0.55-1.30           CREATININE FOR GFR eCW1 ([facility]  

 

                    101                     GLUCOSE, FASTING eCW1 ([facility])  

 

                    103                     CHLORIDE LEVEL eCW1 ([facility])  

 

                    53.0      >51                 GLOMERULAR FILTRATION RATE eCW

1 ([facility])  

 

                    4.2       3.5-5.1             POTASSIUM SERUM eCW1 ([facility])  

 

                    136       136-145             SODIUM LEVEL eCW1 ([facility])  

 

                    18        7-37                AST/SGOT  eCW1 ([facility])  

 

                    24        12-78               ALT/SGPT  eCW1 ([facility])  

 

                    9.0       8.5-10.1            CALCIUM LEVEL eCW1 ([facility])  

 

                    29        21-32               CARBON DIOXIDE LEVEL eCW1 ([facility])  

 

                    0.4       0.2-1.0             BILIRUBIN,TOTAL eCW1 ([facility])  

 

                    6.7       6.4-8.2             TOTAL PROTEIN eCW1 ([facility])  

 

                    65                      ALKALINE PHOSPHATASE eCW1 ([facility])  

 

                    3.6       3.2-5.2             ALBUMIN   eCW1 ([facility])  

 

                    1.2       1.2-2.2             ALBUMIN/GLOBULIN RATIO eCW1 ([facility])  









                    ID                  Date                Data Source

 

                    CBC - Complete Blood Count 10/20/2021 12:00:00 AM EDT eCW1 (

[facility])









          Name      Value     Range     Interpretation Code Description Data Sondra

rce(s) Supporting 

Document(s)

 

                    3.2       4.0-10.0            WHITE BLOOD COUNT eCW1 ([facility])  

 

                    4.44      4.00-5.40           RED BLOOD COUNT eCW1 ([facility])  

 

                    37.1      36.0-47.0           HEMATOCRIT eCW1 ([facility])  

 

                    12.0      12.0-15.5           HEMOGLOBIN eCW1 ([facility])  

 

                      13.5       11.5-14.5             RED CELL DISTRIBUTION WID

TH eCW1 ([facility])                                  

 

                      32.3       32.0-36.5             MEAN CORPUSCULAR HGB CONC

 eCW1 ([facility])                                  

 

                      27.0       27.0-33.0             MEAN CORPUSCULAR HEMOGLOB

IN eCW1 ([facility])                                  

 

                      83.6       80.0-96.0             MEAN CORPUSCULAR VOLUME e

CW1 ([facility])

                                         

 

                    186       150-450             PLATELET COUNT, AUTOMATED eCW1

 ([facility]) 











                    ID                  Date                Data Source

 

                    [id number]            2021 06:00:00 PM EDT [facility]









          Name      Value     Range     Interpretation Code Description Data Sondra

rce(s) Supporting 

Document(s)

 

                                        SARS coronavirus 2 RNA [Presence] in Res

piratory specimen by [facility] with probe 

detection  POSITIVE                                    [facility]      

 

                                        This lab was ordered by [facility] a

nd reported by [facility].

 









                    ID                  Date                Data Source

 

                    ADM CHEST 2 VIEW    10/13/2020 01:05:05 PM EDT eCW1 ([facility])









          Name      Value     Range     Interpretation Code Description Data Sondra

rce(s) Supporting 

Document(s)

 

                                                  ADM CHEST 2 VIEW eCW1 ([facility])  







                                        Procedure

 

                                          



                                                                                
                                                                              



Social History

          



           Code       Duration   Value      Status     Description Data Source(s

)

 

           Smoking    10/19/2021 12:00:00 AM EDT Never Smoker completed  Never S

moker eCW1 

([facility])

 

           Smoking    10/13/2021 12:00:00 AM EDT Never Smoker completed  Never S

moker eCW1 

([facility])

 

           Smoking    07/15/2021 12:00:00 AM EDT Never Smoker completed  Never S

moker eCW1 

([facility])

 

           Smoking    07/15/2021 12:00:00 AM EDT Never Smoker completed  Never S

moker eCW1 

([facility])

 

           Smoking    07/15/2021 12:00:00 AM EDT Never Smoker completed  Never S

moker eCW1 

([facility])

 

           Smoking    07/15/2021 12:00:00 AM EDT Never Smoker completed  Never S

moker eCW1 

([facility])

 

           Smoking    07/15/2021 12:00:00 AM EDT Never Smoker completed  Never S

moker eCW1 

([facility])

 

           Smoking    10/08/2020 12:00:00 AM EDT Never Smoker completed  Never S

moker eCW1 

([facility])

 

           Smoking    10/08/2020 12:00:00 AM EDT Never Smoker completed  Never S

moker eCW1 

([facility])

 

           Smoking    10/08/2020 12:00:00 AM EDT Never Smoker completed  Never S

moker eCW1 

([facility])

 

           Smoking    10/08/2020 12:00:00 AM EDT Never Smoker completed  Never S

moker eCW1 

([facility])



                                                                                
                                                                                
                                      



Vital Signs

          



                    ID                  Date                Data Source

 

                    UNK                                      









           Name       Value      Range      Interpretation Code Description Data

 Source(s)

 

           Body weight 154 [lb_av]                       154 [lb_av] [facility]

 

           Body height 68 [in_i]                        68 [in_i]  eCW1 ([facility])

 

           Body mass index (BMI) [Ratio] 23.41 kg/m2                       23.41

 kg/m2 eCW1 ([facility])

 

           Heart rate 94 /min                          94 /min    eCW1 ([facility])

 

           Respiratory rate 20 /min                          20 /min    eCW1 ([facility])

 

           Body temperature 97.5 [degF]                       97.5 [degF] eCW1 (

[facility])

 

           Systolic blood pressure 116 mm[Hg]                       116 mm[Hg] e

CW1 ([facility])

 

           Diastolic blood pressure 66 mm[Hg]                        66 mm[Hg]  

eCW1 ([facility])

 

           Body weight 155.6 [lb_av]                       155.6 [lb_av] eCW1 ([facility])

 

           Body weight 70.58 kg                         70.58 kg   eCW1 ([facility])

 

           Body height 68 [in_i]                        68 [in_i]  eCW1 ([facility])

 

           Body mass index (BMI) [Ratio] 23.66 kg/m2                       23.66

 kg/m2 eCW1 ([facility])

 

           Systolic blood pressure 122 mm[Hg]                       122 mm[Hg] e

CW1 ([facility])

 

           Diastolic blood pressure 76 mm[Hg]                        76 mm[Hg]  

eCW1 ([facility])

 

           Body weight 155 [lb_av]                       155 [lb_av] eCW[facility]

 

           Body height                                   [in_i]    eCW1 ([facility])

 

           Body mass index (BMI) [Ratio] 23.57 kg/m2                       23.57

 kg/m2 eCW1 ([facility])

 

           Heart rate 98 /min                          98 /min    eCW1 ([facility])

 

           Respiratory rate 18 /min                          18 /min    eCW1 ([facility])

 

           Body temperature 96.9 [degF]                       96.9 [degF] eCW1 (

[facility])

 

           Systolic blood pressure 116 mm[Hg]                       116 mm[Hg] e

CW1 ([facility])

 

           Diastolic blood pressure 60 mm[Hg]                        60 mm[Hg]  

eCW1 ([facility])

 

           Body weight 155 [lb_av]                       155 [lb_av] eCW[facility] ([facility]

 

           Body height                                   [in_i]    eCW1 ([facility])

 

           Body mass index (BMI) [Ratio] 23.57 kg/m2                       23.57

 kg/m2 eCW1 ([facility])

 

           Heart rate 101 /min                         101 /min   eCW1 ([facility])

 

           Respiratory rate 18 /min                          18 /min    eCW1 ([facility])

 

           Body temperature 97.6 [degF]                       97.6 [degF] eCW1 (

[facility])

 

           Systolic blood pressure 110 mm[Hg]                       110 mm[Hg] e

CW1 ([facility])

 

           Diastolic blood pressure 68 mm[Hg]                        68 mm[Hg]  

eCW1 ([facility])



                                                                                
                  



Patient Treatment Plan of Care

          



             Planned Activity Planned Date Details      Description  Data Source

(s)

 

                    Triamcinolone Acetonide 0.001 MG/MG Topical Ointment 10/13/2

021 12:00:00 AM EDT 

                                                    eCW1 ([facility])

 

             Omeprazole 20 MG Delayed Release Oral Capsule 10/08/2020 12:00:00 A

M EDT                           

eCW1 ([facility])

 

             Biotin 1 MG Oral Tablet 10/08/2020 12:00:00 AM EDT                 

          eCW1 ([facility])

 

             Omeprazole 20 MG Delayed Release Oral Capsule 10/08/2020 12:00:00 A

M EDT                           

eCW1 ([facility])

 

             Biotin 1 MG Oral Tablet 10/08/2020 12:00:00 AM EDT                 

          eCW1 ([facility])

 

             Omeprazole 20 MG Delayed Release Oral Capsule 10/08/2020 12:00:00 A

M EDT                           

eCW1 ([facility])

 

             Biotin 1 MG Oral Tablet 10/08/2020 12:00:00 AM EDT                 

          eCW1 ([facility])

 

             Omeprazole 20 MG Delayed Release Oral Capsule 10/08/2020 12:00:00 A

M EDT                           

eCW1 ([facility])

 

             Biotin 1 MG Oral Tablet 10/08/2020 12:00:00 AM EDT                 

          eCW1 ([facility])

## 2021-11-18 ENCOUNTER — HOSPITAL ENCOUNTER (OUTPATIENT)
Dept: PHARMACY | Age: 86
Setting detail: THERAPIES SERIES
Discharge: HOME | End: 2021-11-18
Payer: MEDICARE

## 2021-11-18 LAB — INTERNATIONAL NORMALIZATION RATIO, POC: 2

## 2021-11-18 PROCEDURE — 85610 PROTHROMBIN TIME: CPT

## 2021-11-18 PROCEDURE — 99211 OFF/OP EST MAY X REQ PHY/QHP: CPT

## 2021-11-18 NOTE — PROGRESS NOTES
Mr. Harish Espinoza is a 82 y.o. male with history of Afib who presents today for anticoagulation monitoring and adjustment.   INR 2.0 is therapeutic for this patient (goal range 2-3) and is reflective of 35 mg TWD  Patient verifies current dosing regimen, patient able to verbally recall dose  Patient reports zero missed doses since last INR   Patient denies s/sx clotting and/or stroke  Patient denies hematuria, epistaxis, rectal bleeding  Patient denies changes in diet, alcohol, or tobacco use  Reviewed medication list and drug allergies with patient, updated any medication additions or modifications accordingly  Patient also denies any pending medical or dental procedures scheduled at this time  Patient was instructed to continue current regimen of warfarin 35 mg TWD and RTC 6 weeks    For Pharmacy Admin Tracking Only     Intervention Detail: Adherence Monitorin   Total # of Interventions Recommended: 1   Total # of Interventions Accepted: 1   Time Spent (min): [address], PharmD   2021 8:26 AM

## 2021-12-30 ENCOUNTER — HOSPITAL ENCOUNTER (OUTPATIENT)
Dept: PHARMACY | Age: 86
Setting detail: THERAPIES SERIES
Discharge: HOME | End: 2021-12-30
Payer: MEDICARE

## 2021-12-30 DIAGNOSIS — I48.91 ATRIAL FIBRILLATION, UNSPECIFIED TYPE (HCC): Primary | ICD-10-CM

## 2021-12-30 LAB — INTERNATIONAL NORMALIZATION RATIO, POC: 1.7

## 2021-12-30 PROCEDURE — 99211 OFF/OP EST MAY X REQ PHY/QHP: CPT | Performed by: PHARMACIST

## 2021-12-30 PROCEDURE — 85610 PROTHROMBIN TIME: CPT | Performed by: PHARMACIST

## 2021-12-30 NOTE — PROGRESS NOTES
Mr. Perla Agarwal is a 82 y.o. y/o male with history of Afib who presents today for anticoagulation monitoring and adjustment. INR 1.7 is sub-therapeutic for this patient (goal range 2-3) and is reflective of 35 mg TWD  Patient verifies current dosing regimen, patient able to verbally recall dose  Patient reports NO  missed doses since last INR   Patient denies s/sx clotting and/or stroke  Patient denies hematuria, epistaxis, rectal bleeding  Patient denies changes in diet, alcohol, or tobacco use  Reviewed medication list and drug allergies with patient, updated any medication additions or modifications accordingly  Patient also denies any pending medical or dental procedures scheduled at this time  Patient was instructed to take extra half tablet today then continue 35 mg TWD and RTC 6 weeks  Patient states no missed doses or change in diet or medications. Patient has been stable for past 6 visits. Boosted today and okayed normal 6 week visit.      For Pharmacy Admin Tracking Only     Intervention Detail: Adherence Monitorin and Dose Adjustment: 1, reason: Improve Adherence, Therapy Optimization   Total # of Interventions Recommended: 1   Total # of Interventions Accepted: 1   Time Spent (min): 15

## 2022-02-10 ENCOUNTER — HOSPITAL ENCOUNTER (OUTPATIENT)
Dept: PHARMACY | Age: 87
Setting detail: THERAPIES SERIES
Discharge: HOME | End: 2022-02-10
Payer: MEDICARE

## 2022-02-10 DIAGNOSIS — I48.91 ATRIAL FIBRILLATION, UNSPECIFIED TYPE (HCC): Primary | ICD-10-CM

## 2022-02-10 LAB — INTERNATIONAL NORMALIZATION RATIO, POC: 1.7

## 2022-02-10 PROCEDURE — 85610 PROTHROMBIN TIME: CPT

## 2022-02-10 PROCEDURE — 99211 OFF/OP EST MAY X REQ PHY/QHP: CPT

## 2022-02-10 NOTE — PROGRESS NOTES
Mr. En Davis is a 82 y.o. y/o male with history of Afib who presents today for anticoagulation monitoring and adjustment. Due to COVID protocol, extensive cleaning with Virex performed before and after visit. Masks worn by both parties the entire visit. INR 1.7 is subtherapeutic for this patient (goal range 2-3) and is reflective of 35 mg TWD.  Second visit subtherapeutic  Patient verifies current dosing regimen, patient able to verbally recall dose  Patient reports 0  missed doses since last INR   Patient denies s/sx clotting and/or stroke  Patient denies hematuria, epistaxis, rectal bleeding  Patient denies changes in diet, alcohol, or tobacco use  Reviewed medication list and drug allergies with patient, updated any medication additions or modifications accordingly  Patient also denies any pending medical or dental procedures scheduled at this time  Patient was instructed to increase to  37.5 mg TWD and RTC 4 weeks    For Pharmacy Admin Tracking Only     Intervention Detail: Adherence Monitorin   Total # of Interventions Recommended: 0   Total # of Interventions Accepted: 0   Time Spent (min): 15

## 2022-03-10 ENCOUNTER — HOSPITAL ENCOUNTER (OUTPATIENT)
Dept: PHARMACY | Age: 87
Setting detail: THERAPIES SERIES
Discharge: HOME | End: 2022-03-10
Payer: MEDICARE

## 2022-03-10 DIAGNOSIS — I48.91 ATRIAL FIBRILLATION, UNSPECIFIED TYPE (HCC): Primary | ICD-10-CM

## 2022-03-10 LAB — INTERNATIONAL NORMALIZATION RATIO, POC: 2.2

## 2022-03-10 PROCEDURE — 99211 OFF/OP EST MAY X REQ PHY/QHP: CPT

## 2022-03-10 PROCEDURE — 85610 PROTHROMBIN TIME: CPT

## 2022-03-24 LAB
ANION GAP SERPL CALCULATED.3IONS-SCNC: 11 MEQ/L (ref 9–15)
BUN BLDV-MCNC: 19 MG/DL (ref 8–23)
CALCIUM SERPL-MCNC: 9.6 MG/DL (ref 8.5–9.9)
CHLORIDE BLD-SCNC: 104 MEQ/L (ref 95–107)
CO2: 28 MEQ/L (ref 20–31)
CREAT SERPL-MCNC: 1.12 MG/DL (ref 0.7–1.2)
GFR AFRICAN AMERICAN: >60
GFR NON-AFRICAN AMERICAN: >60
GLUCOSE BLD-MCNC: 95 MG/DL (ref 70–99)
MAGNESIUM: 2.5 MG/DL (ref 1.7–2.4)
POTASSIUM SERPL-SCNC: 4.1 MEQ/L (ref 3.4–4.9)
SODIUM BLD-SCNC: 143 MEQ/L (ref 135–144)

## 2022-03-25 LAB
MISCELLANEOUS LAB TEST ORDER: ABNORMAL
WHOPPER PROMPT: ABNORMAL

## 2022-04-11 RX ORDER — WARFARIN SODIUM 5 MG/1
TABLET ORAL
Qty: 100 TABLET | Refills: 1 | Status: SHIPPED | OUTPATIENT
Start: 2022-04-11 | End: 2022-10-06 | Stop reason: SDUPTHER

## 2022-04-21 ENCOUNTER — HOSPITAL ENCOUNTER (OUTPATIENT)
Dept: PHARMACY | Age: 87
Setting detail: THERAPIES SERIES
Discharge: HOME | End: 2022-04-21
Payer: MEDICARE

## 2022-04-21 DIAGNOSIS — I48.91 ATRIAL FIBRILLATION, UNSPECIFIED TYPE (HCC): Primary | ICD-10-CM

## 2022-04-21 LAB — INTERNATIONAL NORMALIZATION RATIO, POC: 2.3

## 2022-04-21 PROCEDURE — 85610 PROTHROMBIN TIME: CPT | Performed by: PHARMACIST

## 2022-04-21 PROCEDURE — 99211 OFF/OP EST MAY X REQ PHY/QHP: CPT | Performed by: PHARMACIST

## 2022-04-21 NOTE — PROGRESS NOTES
Mr. Loretta Aleman is a 82 y.o. y/o male with history of Afib who presents today for anticoagulation monitoring and adjustment.   INR 2.3 is therapeutic for this patient (goal range 2-3) and is reflective of 37.5 mg TWD  Patient verifies current dosing regimen, patient able to verbally recall dose  Patient reports NO  missed doses since last INR   Patient denies s/sx clotting and/or stroke  Patient denies hematuria, epistaxis, rectal bleeding  Patient denies changes in diet, alcohol, or tobacco use  Reviewed medication list and drug allergies with patient, updated any medication additions or modifications accordingly  Patient also denies any pending medical or dental procedures scheduled at this time  Patient was instructed to continue 37.5 mg TWD and RTC 6 weeks    For Pharmacy Admin Tracking Only     Intervention Detail: Adherence Monitorin   Total # of Interventions Recommended: 0   Total # of Interventions Accepted: 0   Time Spent (min): 15

## 2022-06-02 ENCOUNTER — HOSPITAL ENCOUNTER (OUTPATIENT)
Dept: PHARMACY | Age: 87
Setting detail: THERAPIES SERIES
Discharge: HOME | End: 2022-06-02
Payer: MEDICARE

## 2022-06-02 DIAGNOSIS — I48.91 ATRIAL FIBRILLATION, UNSPECIFIED TYPE (HCC): Primary | ICD-10-CM

## 2022-06-02 LAB — INTERNATIONAL NORMALIZATION RATIO, POC: 2

## 2022-06-02 PROCEDURE — 99211 OFF/OP EST MAY X REQ PHY/QHP: CPT

## 2022-06-02 PROCEDURE — 85610 PROTHROMBIN TIME: CPT

## 2022-06-02 NOTE — PROGRESS NOTES
Mr. Soni Blackwell is a 82 y.o. y/o male with history of Afib who presents today for anticoagulation monitoring and adjustment. Face to face visit completed, procedural mask worn by myself as instructed; procedural mask worn by patient, room cleaned pre and post visit with Virex Plus spray and/anti-viral wipe.      INR 2 is therapeutic for this patient (goal range 2-3) and is reflective of 37.5 mg TWD  Patient verifies current dosing regimen, patient able to verbally recall dose  Patient reports 0 missed doses since last INR   Patient denies s/sx clotting and/or stroke  Patient denies hematuria, epistaxis, rectal bleeding  Patient denies changes in diet, alcohol, or tobacco use  Reviewed medication list and drug allergies with patient, updated any medication additions or modifications accordingly  Patient also denies any pending medical or dental procedures scheduled at this time  Patient was instructed to continue with warfarin 37.5mg TWD and RTC 6 weeks    For Pharmacy Admin Tracking Only     Intervention Detail: Adherence Monitorin and Lab(s) Ordered   Total # of Interventions Recommended: 2   Total # of Interventions Accepted: 2   Time Spent (min): [address] Gerardo Ferreira, GertrudeD, BCPS   2022 8:25 AM

## 2022-06-06 ENCOUNTER — HOSPITAL ENCOUNTER (OUTPATIENT)
Dept: LAB | Age: 87
Discharge: HOME | End: 2022-06-06
Payer: MEDICARE

## 2022-06-06 LAB
ALBUMIN SERPL-MCNC: 4.5 G/DL (ref 3.5–4.6)
ALP BLD-CCNC: 93 U/L (ref 35–104)
ALT SERPL-CCNC: 15 U/L (ref 0–41)
ANION GAP SERPL CALCULATED.3IONS-SCNC: 15 MEQ/L (ref 9–15)
AST SERPL-CCNC: 19 U/L (ref 0–40)
BASOPHILS ABSOLUTE: 0 K/UL (ref 0–0.2)
BASOPHILS RELATIVE PERCENT: 0.7 %
BILIRUB SERPL-MCNC: 0.9 MG/DL (ref 0.2–0.7)
BUN BLDV-MCNC: 21 MG/DL (ref 8–23)
CALCIUM SERPL-MCNC: 9 MG/DL (ref 8.5–9.9)
CHLORIDE BLD-SCNC: 101 MEQ/L (ref 95–107)
CHOLESTEROL, TOTAL: 143 MG/DL (ref 0–199)
CO2: 24 MEQ/L (ref 20–31)
CREAT SERPL-MCNC: 0.88 MG/DL (ref 0.7–1.2)
EOSINOPHILS ABSOLUTE: 0.1 K/UL (ref 0–0.7)
EOSINOPHILS RELATIVE PERCENT: 1.3 %
GFR AFRICAN AMERICAN: >60
GFR NON-AFRICAN AMERICAN: >60
GLOBULIN: 2.9 G/DL (ref 2.3–3.5)
GLUCOSE BLD-MCNC: 100 MG/DL (ref 70–99)
HCT VFR BLD CALC: 42.5 % (ref 42–52)
HDLC SERPL-MCNC: 59 MG/DL (ref 40–59)
HEMOGLOBIN: 14.8 G/DL (ref 14–18)
LDL CHOLESTEROL CALCULATED: 67 MG/DL (ref 0–129)
LYMPHOCYTES ABSOLUTE: 2.2 K/UL (ref 1–4.8)
LYMPHOCYTES RELATIVE PERCENT: 39 %
MCH RBC QN AUTO: 35.1 PG (ref 27–31.3)
MCHC RBC AUTO-ENTMCNC: 34.8 % (ref 33–37)
MCV RBC AUTO: 100.9 FL (ref 80–100)
MONOCYTES ABSOLUTE: 0.7 K/UL (ref 0.2–0.8)
MONOCYTES RELATIVE PERCENT: 11.6 %
NEUTROPHILS ABSOLUTE: 2.7 K/UL (ref 1.4–6.5)
NEUTROPHILS RELATIVE PERCENT: 47.4 %
PDW BLD-RTO: 13.8 % (ref 11.5–14.5)
PLATELET # BLD: 177 K/UL (ref 130–400)
POTASSIUM SERPL-SCNC: 3.4 MEQ/L (ref 3.4–4.9)
RBC # BLD: 4.21 M/UL (ref 4.7–6.1)
SODIUM BLD-SCNC: 140 MEQ/L (ref 135–144)
TOTAL PROTEIN: 7.4 G/DL (ref 6.3–8)
TRIGL SERPL-MCNC: 85 MG/DL (ref 0–150)
WBC # BLD: 5.7 K/UL (ref 4.8–10.8)

## 2022-06-06 PROCEDURE — 36415 COLL VENOUS BLD VENIPUNCTURE: CPT

## 2022-06-06 PROCEDURE — 83550 IRON BINDING TEST: CPT

## 2022-06-06 PROCEDURE — 80053 COMPREHEN METABOLIC PANEL: CPT

## 2022-06-06 PROCEDURE — 83540 ASSAY OF IRON: CPT

## 2022-06-06 PROCEDURE — 80061 LIPID PANEL: CPT

## 2022-06-06 PROCEDURE — 85025 COMPLETE CBC W/AUTO DIFF WBC: CPT

## 2022-06-07 LAB
IRON SATURATION: 42 % (ref 20–55)
IRON: 134 UG/DL (ref 59–158)
TOTAL IRON BINDING CAPACITY: 319 UG/DL (ref 250–450)
UNSATURATED IRON BINDING CAPACITY: 185 UG/DL (ref 112–347)

## 2022-07-14 ENCOUNTER — HOSPITAL ENCOUNTER (OUTPATIENT)
Dept: PHARMACY | Age: 87
Setting detail: THERAPIES SERIES
Discharge: HOME | End: 2022-07-14
Payer: MEDICARE

## 2022-07-14 LAB — INTERNATIONAL NORMALIZATION RATIO, POC: 2.4

## 2022-07-14 PROCEDURE — 85610 PROTHROMBIN TIME: CPT

## 2022-07-14 PROCEDURE — 99211 OFF/OP EST MAY X REQ PHY/QHP: CPT

## 2022-07-14 NOTE — PROGRESS NOTES
Mr. Kattie Kayser is a 82 y.o. y/o male with history of Afib who presents today for anticoagulation monitoring and adjustment.   INR 2.4 is therapeutic for this patient (goal range 2-3) and is reflective of 37.5 mg TWD  Patient verifies current dosing regimen, patient able to verbally recall dose  Patient reports no  missed doses since last INR   Patient denies s/sx clotting and/or stroke  Patient denies hematuria, epistaxis, rectal bleeding  Patient denies changes in diet, alcohol, or tobacco use  Reviewed medication list and drug allergies with patient, updated any medication additions or modifications accordingly  Patient also denies any pending medical or dental procedures scheduled at this time  Patient was instructed to continue 37.5 mg TWD and RTC 6 weeks      For Pharmacy Admin Tracking Only     Intervention Detail: Adherence Monitorin   Total # of Interventions Recommended: 1   Total # of Interventions Accepted: 1   Time Spent (min): [place] PharmD Student

## 2022-08-25 ENCOUNTER — HOSPITAL ENCOUNTER (OUTPATIENT)
Dept: PHARMACY | Age: 87
Setting detail: THERAPIES SERIES
Discharge: HOME | End: 2022-08-25
Payer: MEDICARE

## 2022-08-25 DIAGNOSIS — I48.91 ATRIAL FIBRILLATION, UNSPECIFIED TYPE (HCC): Primary | ICD-10-CM

## 2022-08-25 LAB — INTERNATIONAL NORMALIZATION RATIO, POC: 2.6

## 2022-08-25 PROCEDURE — 85610 PROTHROMBIN TIME: CPT

## 2022-08-25 PROCEDURE — 99211 OFF/OP EST MAY X REQ PHY/QHP: CPT

## 2022-08-25 NOTE — PROGRESS NOTES
Mr. Linda Blunt is a 82 y.o. y/o male with history of Afib who presents today for anticoagulation monitoring and adjustment.     INR 2.6 is therapeutic for this patient (goal range 2-3) and is reflective of 37.5 mg TWD    Patient verifies current dosing regimen, patient able to verbally recall dose  Patient reports 0  missed doses since last INR   Patient denies s/sx clotting and/or stroke  Patient denies hematuria, epistaxis, rectal bleeding  Patient denies changes in diet, alcohol, or tobacco use  Reviewed medication list and drug allergies with patient, updated any medication additions or modifications accordingly  Patient also denies any pending medical or dental procedures scheduled at this time    Patient was instructed to continue current regimen of 5 mg on Mon, Tu, Wed, Fri, Sat, Sun and 7.5 mg on Thurs (37.5 mg TWD) and RTC 6 weeks    Shannon Holbrook, PharmD  PGY-1 Resident    For Pharmacy Admin Tracking Only    Intervention Detail: Adherence Monitorin  Total # of Interventions Recommended: 1  Total # of Interventions Accepted: 1  Time Spent (min): 20

## 2022-09-27 ENCOUNTER — OFFICE VISIT (OUTPATIENT)
Dept: PULMONOLOGY | Age: 87
End: 2022-09-27
Payer: MEDICARE

## 2022-09-27 VITALS
WEIGHT: 196 LBS | SYSTOLIC BLOOD PRESSURE: 132 MMHG | HEART RATE: 81 BPM | OXYGEN SATURATION: 97 % | BODY MASS INDEX: 28.12 KG/M2 | DIASTOLIC BLOOD PRESSURE: 76 MMHG

## 2022-09-27 DIAGNOSIS — E66.3 OVERWEIGHT (BMI 25.0-29.9): ICD-10-CM

## 2022-09-27 DIAGNOSIS — Z99.89 OSA ON CPAP: Primary | ICD-10-CM

## 2022-09-27 DIAGNOSIS — G47.33 OSA ON CPAP: Primary | ICD-10-CM

## 2022-09-27 PROCEDURE — 99214 OFFICE O/P EST MOD 30 MIN: CPT | Performed by: INTERNAL MEDICINE

## 2022-09-27 PROCEDURE — 1123F ACP DISCUSS/DSCN MKR DOCD: CPT | Performed by: INTERNAL MEDICINE

## 2022-09-27 ASSESSMENT — ENCOUNTER SYMPTOMS
RHINORRHEA: 0
SHORTNESS OF BREATH: 0
NAUSEA: 0
EYE ITCHING: 0
CHEST TIGHTNESS: 0
SORE THROAT: 0
ABDOMINAL PAIN: 0
WHEEZING: 0
VOICE CHANGE: 0
VOMITING: 0
COUGH: 0
DIARRHEA: 0

## 2022-09-27 NOTE — PROGRESS NOTES
Subjective:     Huan Lamas is a 82 y.o. male who complains today of:     Chief Complaint   Patient presents with    Sleep Apnea    Follow-up     1 year f/u       HPI  He is using CPAP with  7  centimeters of H2O with heated humidity. He is using CPAP for about  7-8 hours every night. He is using CPAP with  nasal  Mask. He has CPAP more than 7 yrs old and need new CPAP machine , DME is [facility]  He said  sleep is restful with the CPAP use. He is compliant with CPAP therapy and benefiting with CPAP use. No snoring with CPAP use. No complaint of daytime sleepiness or tiredness with CPAP use. He denies taking naps. No sleepiness with driving. He denies difficulty falling asleep or staying asleep.           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    Tinnitus 06/28/2010     Past Surgical History:   Procedure Laterality Date    CATARACT REMOVAL  2011     Family History   Problem Relation Age of Onset    Heart Disease Father      Social History     Socioeconomic History    Marital status:      Spouse name: Not on file    Number of children: Not on file    Years of education: Not on file    Highest education level: Not on file   Occupational History    Not on file   Tobacco Use    Smoking status: Never    Smokeless tobacco: Never   Substance and Sexual Activity    Alcohol use: Not on file    Drug use: Not on file    Sexual activity: Not on file   Other Topics Concern    Not on file   Social History Narrative    Not on file     Social Determinants of Health     Financial Resource Strain: Not on file   Food Insecurity: Not on file   Transportation Needs: Not on file   Physical Activity: Not on file   Stress: Not on file   Social Connections: Not on file   Intimate Partner Violence: Not on file   Housing Stability: Not on file         Review of Systems   Constitutional:  Negative for chills, diaphoresis, fatigue and fever. HENT:  Negative for congestion, mouth sores, nosebleeds, postnasal drip, rhinorrhea, sneezing, sore throat and voice change. Eyes:  Negative for itching and visual disturbance. Respiratory:  Negative for cough, chest tightness, shortness of breath and wheezing. Cardiovascular: Negative. Negative for chest pain, palpitations and leg swelling. Gastrointestinal:  Negative for abdominal pain, diarrhea, nausea and vomiting. Genitourinary:  Negative for difficulty urinating and hematuria. Musculoskeletal:  Negative for arthralgias, joint swelling and myalgias. Skin:  Negative for rash. Allergic/Immunologic: Negative for environmental allergies. Neurological:  Negative for dizziness, tremors, weakness and headaches. Psychiatric/Behavioral:  Positive for sleep disturbance. Negative for behavioral problems. :     Vitals:    09/27/22 0904   BP: 132/76   Site: Right Upper Arm   Position: Sitting   Cuff Size: Medium Adult   Pulse: 81   SpO2: 97%   Weight: 196 lb (88.9 kg)     Wt Readings from Last 3 Encounters:   09/27/22 196 lb (88.9 kg)   09/28/21 191 lb (86.6 kg)   08/31/21 192 lb (87.1 kg)         Physical Exam  Constitutional:       Appearance: He is well-developed. HENT:      Head: Normocephalic and atraumatic. Nose: Nose normal.   Eyes:      Conjunctiva/sclera: Conjunctivae normal.      Pupils: Pupils are equal, round, and reactive to light. Neck:      Thyroid: No thyromegaly. Vascular: No JVD. Trachea: No tracheal deviation. Cardiovascular:      Rate and Rhythm: Normal rate and regular rhythm. Heart sounds: No murmur heard. No friction rub. No gallop. Pulmonary:      Effort: Pulmonary effort is normal. No respiratory distress. Breath sounds: Normal breath sounds. No wheezing or rales. Chest:      Chest wall: No tenderness.    Abdominal:      General: There is no distension. Musculoskeletal:         General: Normal range of motion. Lymphadenopathy:      Cervical: No cervical adenopathy. Skin:     General: Skin is warm and dry. Findings: No rash. Neurological:      Mental Status: He is alert and oriented to person, place, and time. Cranial Nerves: No cranial nerve deficit. Psychiatric:         Behavior: Behavior normal.       Current Outpatient Medications   Medication Sig Dispense Refill    CPAP Machine MISC by Does not apply route New CPAP with 7 cm  of H2O and CPAP supply. 1 each 0    warfarin (COUMADIN) 5 MG tablet Indications: Atrial Fibrillation Take as directed by [facility] EM[facility] [place] [facility]. Quantity for 90 day supply. 100 tablet 1    CPAP Machine MISC by NOT APPLICABLE route      losartan-hydroCHLOROthiazide (HYZAAR) 50-12.5 MG per tablet TAKE 1 TABLET BY MOUTH ONCE DAILY FOR 90 DAYS      COVID-19 mRNA Vacc, PFIZER, 30 MCG/0.3ML SUSP injection Inject into the muscle once Last dose March      amLODIPine (NORVASC) 5 MG tablet TAKE 1 TABLET BY MOUTH TWICE DAILY FOR 90 DAYS      DIGOXIN PO Take 1 tablet by mouth      MAGNESIUM PO Take by mouth      aspirin 81 MG chewable tablet Take 81 mg by mouth daily      Respiratory Therapy Supplies MASSIEL New CPAP mask and supplies 1 Device 0    tamsulosin (FLOMAX) 0.4 MG capsule Take 0.4 mg by mouth daily      verapamil (VERELAN) 120 MG extended release capsule Take 180 mg by mouth       CPAP Machine MISC by Does not apply route Indications: 7 cm with sleep 6-8 hours      pravastatin (PRAVACHOL) 10 MG tablet Take 10 mg by mouth daily       No current facility-administered medications for this visit. Results for orders placed during the hospital encounter of 11/07/14    XR Chest Standard TWO VW    Narrative  EXAMINATION CHEST TWO VIEWS    CLINICAL HISTORY Shortness of breath    COMPARISONS 5/8/09    FINDINGS Heart, mediastinum, and hilum are unremarkable.  Dense  calcified granuloma of the Leigh Winn MD

## 2022-10-06 ENCOUNTER — HOSPITAL ENCOUNTER (OUTPATIENT)
Dept: PHARMACY | Age: 87
Setting detail: THERAPIES SERIES
Discharge: HOME | End: 2022-10-06
Payer: MEDICARE

## 2022-10-06 DIAGNOSIS — I48.91 ATRIAL FIBRILLATION, UNSPECIFIED TYPE (HCC): Primary | ICD-10-CM

## 2022-10-06 LAB — INTERNATIONAL NORMALIZATION RATIO, POC: 1.8

## 2022-10-06 PROCEDURE — 99211 OFF/OP EST MAY X REQ PHY/QHP: CPT

## 2022-10-06 PROCEDURE — 85610 PROTHROMBIN TIME: CPT

## 2022-10-06 RX ORDER — WARFARIN SODIUM 5 MG/1
TABLET ORAL
Qty: 100 TABLET | Refills: 1 | Status: SHIPPED | OUTPATIENT
Start: 2022-10-06

## 2022-10-06 NOTE — PROGRESS NOTES
Mr. Ana Laureano is a 82 y.o. y/o male with history of Afib who presents today for anticoagulation monitoring and adjustment. INR 1.8 is slightly sub therapeutic for this patient (goal range 2-3) and is reflective of 37.5 mg TWD  Patient verifies current dosing regimen, patient able to verbally recall dose  Current weekly dose is 37.5 mg TWD. Patient reports 0 missed doses since last INR   Patient denies s/sx clotting and/or stroke  Patient denies hematuria, epistaxis, rectal bleeding  Patient denies changes in diet, alcohol, or tobacco use  Reviewed medication list and drug allergies with patient, updated any medication additions or modifications accordingly  Patient also denies any pending medical or dental procedures scheduled at this time    Could not identify reason for slight decrease, as patient has been therapeutic on this dose for several visits I did not change dose. Patient is due to take boosted dose of 7.5 mg tonight. Patient was instructed to continue with TWD 37.5 mg and RTC 6 weeks. Patient requested to be scheduled at [facility]. Patient requested refill.      Olena Luke, PharmD  PGY1 Pharmacy Resident  10/6/2022 8:49 AM    For Pharmacy Admin Tracking Only    Intervention Detail: Adherence Monitorin and Refill(s) Provided  Total # of Interventions Recommended: 1  Total # of Interventions Accepted: 1  Time Spent (min): 30

## 2022-11-16 ENCOUNTER — HOSPITAL ENCOUNTER (OUTPATIENT)
Dept: PHARMACY | Age: 87
Setting detail: THERAPIES SERIES
Discharge: HOME | End: 2022-11-16
Payer: MEDICARE

## 2022-11-16 DIAGNOSIS — I48.0 PAROXYSMAL ATRIAL FIBRILLATION (HCC): Primary | ICD-10-CM

## 2022-11-16 LAB — INTERNATIONAL NORMALIZATION RATIO, POC: 2

## 2022-11-16 PROCEDURE — 85610 PROTHROMBIN TIME: CPT

## 2022-11-16 PROCEDURE — 99211 OFF/OP EST MAY X REQ PHY/QHP: CPT

## 2022-11-16 NOTE — PROGRESS NOTES
Mr. Reymundo Norris is a 82 y.o. y/o male with history of Afib who presents today for anticoagulation monitoring and adjustment.   INR 2.0 is therapeutic for this patient (goal range 2-3) and is reflective of 37.5 mg TWD  Patient verifies current dosing regimen, patient able to verbally recall dose  Patient reports 0  missed doses since last INR   Patient denies s/sx clotting and/or stroke  Patient denies hematuria, epistaxis, rectal bleeding  Patient denies changes in diet, alcohol, or tobacco use  Reviewed medication list and drug allergies with patient, updated any medication additions or modifications accordingly  Patient also denies any pending medical or dental procedures scheduled at this time  Patient was instructed to continue 37.5 mg TWD and RTC 6 weeks  For Pharmacy Admin Tracking Only    Intervention Detail: Adherence Monitorin  Total # of Interventions Recommended: 1  Total # of Interventions Accepted: 1  Time Spent (min): 15

## 2022-12-05 ENCOUNTER — HOSPITAL ENCOUNTER (OUTPATIENT)
Dept: LAB | Age: 87
Discharge: HOME | End: 2022-12-05
Payer: MEDICARE

## 2022-12-05 LAB
ALBUMIN SERPL-MCNC: 4.4 G/DL (ref 3.5–4.6)
ALP BLD-CCNC: 97 U/L (ref 35–104)
ALT SERPL-CCNC: 16 U/L (ref 0–41)
ANION GAP SERPL CALCULATED.3IONS-SCNC: 11 MEQ/L (ref 9–15)
AST SERPL-CCNC: 23 U/L (ref 0–40)
BASOPHILS ABSOLUTE: 0 K/UL (ref 0–0.2)
BASOPHILS RELATIVE PERCENT: 0.6 %
BILIRUB SERPL-MCNC: 0.7 MG/DL (ref 0.2–0.7)
BUN BLDV-MCNC: 24 MG/DL (ref 8–23)
CALCIUM SERPL-MCNC: 9.3 MG/DL (ref 8.5–9.9)
CHLORIDE BLD-SCNC: 105 MEQ/L (ref 95–107)
CHOLESTEROL, TOTAL: 157 MG/DL (ref 0–199)
CO2: 27 MEQ/L (ref 20–31)
CREAT SERPL-MCNC: 1.03 MG/DL (ref 0.7–1.2)
EOSINOPHILS ABSOLUTE: 0.1 K/UL (ref 0–0.7)
EOSINOPHILS RELATIVE PERCENT: 1.3 %
GFR SERPL CREATININE-BSD FRML MDRD: >60 ML/MIN/{1.73_M2}
GLOBULIN: 3.2 G/DL (ref 2.3–3.5)
GLUCOSE BLD-MCNC: 104 MG/DL (ref 70–99)
HCT VFR BLD CALC: 44.1 % (ref 42–52)
HDLC SERPL-MCNC: 59 MG/DL (ref 40–59)
HEMOGLOBIN: 14.8 G/DL (ref 14–18)
LDL CHOLESTEROL CALCULATED: 83 MG/DL (ref 0–129)
LYMPHOCYTES ABSOLUTE: 2.2 K/UL (ref 1–4.8)
LYMPHOCYTES RELATIVE PERCENT: 36.9 %
MAGNESIUM: 2.4 MG/DL (ref 1.7–2.4)
MCH RBC QN AUTO: 34 PG (ref 27–31.3)
MCHC RBC AUTO-ENTMCNC: 33.6 % (ref 33–37)
MCV RBC AUTO: 101 FL (ref 79–92.2)
MONOCYTES ABSOLUTE: 0.7 K/UL (ref 0.2–0.8)
MONOCYTES RELATIVE PERCENT: 11.7 %
NEUTROPHILS ABSOLUTE: 2.9 K/UL (ref 1.4–6.5)
NEUTROPHILS RELATIVE PERCENT: 49.5 %
PDW BLD-RTO: 13.7 % (ref 11.5–14.5)
PLATELET # BLD: 188 K/UL (ref 130–400)
POTASSIUM SERPL-SCNC: 3.8 MEQ/L (ref 3.4–4.9)
PROSTATE SPECIFIC ANTIGEN: 2.04 NG/ML (ref 0–4)
RBC # BLD: 4.37 M/UL (ref 4.7–6.1)
SODIUM BLD-SCNC: 143 MEQ/L (ref 135–144)
TOTAL PROTEIN: 7.6 G/DL (ref 6.3–8)
TRIGL SERPL-MCNC: 74 MG/DL (ref 0–150)
WBC # BLD: 5.8 K/UL (ref 4.8–10.8)

## 2022-12-05 PROCEDURE — 82306 VITAMIN D 25 HYDROXY: CPT

## 2022-12-05 PROCEDURE — 80053 COMPREHEN METABOLIC PANEL: CPT

## 2022-12-05 PROCEDURE — 85025 COMPLETE CBC W/AUTO DIFF WBC: CPT

## 2022-12-05 PROCEDURE — 80061 LIPID PANEL: CPT

## 2022-12-05 PROCEDURE — 83735 ASSAY OF MAGNESIUM: CPT

## 2022-12-05 PROCEDURE — 84153 ASSAY OF PSA TOTAL: CPT

## 2022-12-05 PROCEDURE — 36415 COLL VENOUS BLD VENIPUNCTURE: CPT

## 2022-12-06 LAB — VITAMIN D 25-HYDROXY: 8.6 NG/ML

## 2022-12-28 ENCOUNTER — HOSPITAL ENCOUNTER (OUTPATIENT)
Dept: PHARMACY | Age: 87
Setting detail: THERAPIES SERIES
Discharge: HOME | End: 2022-12-28
Payer: MEDICARE

## 2022-12-28 ENCOUNTER — HOSPITAL ENCOUNTER (OUTPATIENT)
Dept: LAB | Age: 87
Discharge: HOME | End: 2022-12-28
Payer: MEDICARE

## 2022-12-28 DIAGNOSIS — I48.0 PAROXYSMAL ATRIAL FIBRILLATION (HCC): Primary | ICD-10-CM

## 2022-12-28 LAB
DIGOXIN LEVEL: 0.7 NG/ML (ref 0.8–2)
INTERNATIONAL NORMALIZATION RATIO, POC: 2

## 2022-12-28 PROCEDURE — 36415 COLL VENOUS BLD VENIPUNCTURE: CPT

## 2022-12-28 PROCEDURE — 85610 PROTHROMBIN TIME: CPT

## 2022-12-28 PROCEDURE — 99211 OFF/OP EST MAY X REQ PHY/QHP: CPT

## 2022-12-28 PROCEDURE — 80162 ASSAY OF DIGOXIN TOTAL: CPT

## 2022-12-28 NOTE — PROGRESS NOTES
Mr. Yuki Mckenna is a 82 y.o. y/o male with history of Afib who presents today for anticoagulation monitoring and adjustment.   INR 2.0 is therapeutic for this patient (goal range 2-3) and is reflective of 32.5 mg TWD from last 7 days (37.5 mg TWD maintenance plan)  Patient verifies current dosing regimen, patient able to verbally recall dose  Patient reports 1  missed dose () since last INR   Patient denies s/sx clotting and/or stroke  Patient denies hematuria, epistaxis, rectal bleeding  Patient denies changes in diet, alcohol, or tobacco use  Reviewed medication list and drug allergies with patient, updated any medication additions or modifications accordingly  Patient also denies any pending medical or dental procedures scheduled at this time  Patient was instructed to boost today to 7.5 mg (usual 5 mg), then resume 37.5 mg TWD and RTC 6 weeks  For Pharmacy Admin Tracking Only    Intervention Detail: Adherence Monitorin  Total # of Interventions Recommended: 1  Total # of Interventions Accepted: 1  Time Spent (min): 15

## 2023-02-08 ENCOUNTER — HOSPITAL ENCOUNTER (OUTPATIENT)
Dept: PHARMACY | Age: 88
Setting detail: THERAPIES SERIES
Discharge: HOME | End: 2023-02-08
Payer: MEDICARE

## 2023-02-08 DIAGNOSIS — I48.0 PAROXYSMAL ATRIAL FIBRILLATION (HCC): Primary | ICD-10-CM

## 2023-02-08 LAB — INTERNATIONAL NORMALIZATION RATIO, POC: 2

## 2023-02-08 PROCEDURE — 99211 OFF/OP EST MAY X REQ PHY/QHP: CPT

## 2023-02-08 PROCEDURE — 85610 PROTHROMBIN TIME: CPT

## 2023-02-08 NOTE — PROGRESS NOTES
Mr. Jenn Tineo is a 82 y.o. y/o male with history of Afib who presents today for anticoagulation monitoring and adjustment.   INR 2.0 is therapeutic for this patient (goal range 2-3) and is reflective of 37.5 mg TWD  Patient verifies current dosing regimen, patient able to verbally recall dose  Patient reports 0  missed doses since last INR   Patient denies s/sx clotting and/or stroke  Patient denies hematuria, epistaxis, rectal bleeding  Patient denies changes in diet, alcohol, or tobacco use  Reviewed medication list and drug allergies with patient, updated any medication additions or modifications accordingly  Patient states now taking multivitamin  Patient also denies any pending medical or dental procedures scheduled at this time  Patient was instructed to continue 37.5 mg TWD and RTC 6 weeks  For Pharmacy Admin Tracking Only    Intervention Detail: Adherence Monitorin  Total # of Interventions Recommended: 1  Total # of Interventions Accepted: 1  Time Spent (min): 15

## 2023-03-07 ENCOUNTER — OFFICE VISIT (OUTPATIENT)
Dept: FAMILY MEDICINE CLINIC | Age: 88
End: 2023-03-07
Payer: MEDICARE

## 2023-03-07 VITALS
TEMPERATURE: 99.1 F | OXYGEN SATURATION: 97 % | SYSTOLIC BLOOD PRESSURE: 118 MMHG | DIASTOLIC BLOOD PRESSURE: 64 MMHG | HEIGHT: 69 IN | HEART RATE: 98 BPM | WEIGHT: 193 LBS | BODY MASS INDEX: 28.58 KG/M2

## 2023-03-07 DIAGNOSIS — H61.21 IMPACTED CERUMEN OF RIGHT EAR: ICD-10-CM

## 2023-03-07 DIAGNOSIS — H92.01 RIGHT EAR PAIN: ICD-10-CM

## 2023-03-07 DIAGNOSIS — J02.9 ACUTE PHARYNGITIS, UNSPECIFIED ETIOLOGY: Primary | ICD-10-CM

## 2023-03-07 DIAGNOSIS — J02.0 STREP THROAT: ICD-10-CM

## 2023-03-07 LAB — S PYO AG THROAT QL: POSITIVE

## 2023-03-07 PROCEDURE — 99213 OFFICE O/P EST LOW 20 MIN: CPT

## 2023-03-07 PROCEDURE — 87880 STREP A ASSAY W/OPTIC: CPT

## 2023-03-07 PROCEDURE — 3078F DIAST BP <80 MM HG: CPT

## 2023-03-07 PROCEDURE — 1123F ACP DISCUSS/DSCN MKR DOCD: CPT

## 2023-03-07 PROCEDURE — 3074F SYST BP LT 130 MM HG: CPT

## 2023-03-07 RX ORDER — PRAVASTATIN SODIUM 40 MG
TABLET ORAL
COMMUNITY
Start: 2023-03-02

## 2023-03-07 RX ORDER — MAGNESIUM OXIDE 400 MG/1
TABLET ORAL
COMMUNITY
End: 2023-03-07

## 2023-03-07 RX ORDER — DIGOXIN 125 MCG
TABLET ORAL
COMMUNITY
Start: 2023-03-02

## 2023-03-07 RX ORDER — AZITHROMYCIN 250 MG/1
250 TABLET, FILM COATED ORAL SEE ADMIN INSTRUCTIONS
Qty: 6 TABLET | Refills: 0 | Status: SHIPPED | OUTPATIENT
Start: 2023-03-07 | End: 2023-03-12

## 2023-03-07 RX ORDER — POTASSIUM CHLORIDE 750 MG/1
TABLET, EXTENDED RELEASE ORAL
COMMUNITY
Start: 2022-12-20 | End: 2023-03-07

## 2023-03-07 SDOH — ECONOMIC STABILITY: HOUSING INSECURITY
IN THE LAST 12 MONTHS, WAS THERE A TIME WHEN YOU DID NOT HAVE A STEADY PLACE TO SLEEP OR SLEPT IN A SHELTER (INCLUDING NOW)?: NO

## 2023-03-07 SDOH — ECONOMIC STABILITY: FOOD INSECURITY: WITHIN THE PAST 12 MONTHS, YOU WORRIED THAT YOUR FOOD WOULD RUN OUT BEFORE YOU GOT MONEY TO BUY MORE.: NEVER TRUE

## 2023-03-07 SDOH — ECONOMIC STABILITY: FOOD INSECURITY: WITHIN THE PAST 12 MONTHS, THE FOOD YOU BOUGHT JUST DIDN'T LAST AND YOU DIDN'T HAVE MONEY TO GET MORE.: NEVER TRUE

## 2023-03-07 SDOH — ECONOMIC STABILITY: INCOME INSECURITY: HOW HARD IS IT FOR YOU TO PAY FOR THE VERY BASICS LIKE FOOD, HOUSING, MEDICAL CARE, AND HEATING?: NOT HARD AT ALL

## 2023-03-07 ASSESSMENT — ENCOUNTER SYMPTOMS
DIARRHEA: 0
NAUSEA: 0
SINUS PAIN: 1
SHORTNESS OF BREATH: 0
SINUS PRESSURE: 1
CHEST TIGHTNESS: 0
FACIAL SWELLING: 0
COUGH: 0
COLOR CHANGE: 0

## 2023-03-07 ASSESSMENT — COLUMBIA-SUICIDE SEVERITY RATING SCALE - C-SSRS
1. WITHIN THE PAST MONTH, HAVE YOU WISHED YOU WERE DEAD OR WISHED YOU COULD GO TO SLEEP AND NOT WAKE UP?: NO
6. HAVE YOU EVER DONE ANYTHING, STARTED TO DO ANYTHING, OR PREPARED TO DO ANYTHING TO END YOUR LIFE?: NO
2. HAVE YOU ACTUALLY HAD ANY THOUGHTS OF KILLING YOURSELF?: NO

## 2023-03-07 ASSESSMENT — PATIENT HEALTH QUESTIONNAIRE - PHQ9
7. TROUBLE CONCENTRATING ON THINGS, SUCH AS READING THE NEWSPAPER OR WATCHING TELEVISION: 0
3. TROUBLE FALLING OR STAYING ASLEEP: 0
9. THOUGHTS THAT YOU WOULD BE BETTER OFF DEAD, OR OF HURTING YOURSELF: 0
10. IF YOU CHECKED OFF ANY PROBLEMS, HOW DIFFICULT HAVE THESE PROBLEMS MADE IT FOR YOU TO DO YOUR WORK, TAKE CARE OF THINGS AT HOME, OR GET ALONG WITH OTHER PEOPLE: 0
4. FEELING TIRED OR HAVING LITTLE ENERGY: 0
6. FEELING BAD ABOUT YOURSELF - OR THAT YOU ARE A FAILURE OR HAVE LET YOURSELF OR YOUR FAMILY DOWN: 0
SUM OF ALL RESPONSES TO PHQ9 QUESTIONS 1 & 2: 0
2. FEELING DOWN, DEPRESSED OR HOPELESS: 0
5. POOR APPETITE OR OVEREATING: 0
SUM OF ALL RESPONSES TO PHQ QUESTIONS 1-9: 0
8. MOVING OR SPEAKING SO SLOWLY THAT OTHER PEOPLE COULD HAVE NOTICED. OR THE OPPOSITE, BEING SO FIGETY OR RESTLESS THAT YOU HAVE BEEN MOVING AROUND A LOT MORE THAN USUAL: 0
1. LITTLE INTEREST OR PLEASURE IN DOING THINGS: 0
SUM OF ALL RESPONSES TO PHQ QUESTIONS 1-9: 0

## 2023-03-07 NOTE — PROGRESS NOTES
[facility] Encounter        ASSESSMENT/PLAN     Margaux Ramos is a 82 y.o. male who presents with:  5 out of 10 right ear pain starting yesterday. Also with report of moderate sore throat pain improving today. Reporting chronic sinus congestion making it difficult to breathe with CPAP for 2 weeks. On examination left ear TM has mild bulging. Right ear canal is obstructed with cerumen. Pharynx erythemic tonsils 2+ left side 3+ right side. Neck supple no masses. Rapid strep test is positive. Patient has severe penicillin allergy uncertain of sensitivity to cephalosporins there is no previous record of treatment with antibiotics available. He will be treated with azithromycin 5-day course. Patient educated to monitor for decreasing heart rate less than 50 bpm nausea vomiting diarrhea any changes in the vision. If he experiences any of the symptoms he is to discontinue medication and notify the office immediately. 1. Acute pharyngitis, unspecified etiology  2. Right ear pain  3. Strep throat  -     POCT rapid strep A  -     azithromycin (ZITHROMAX) 250 MG tablet; Take 1 tablet by mouth See Admin Instructions for 5 days 500mg on day 1 followed by 250mg on days 2 - 5, Disp-6 tablet, R-0Normal  4. Impacted cerumen of right ear  -     carbamide peroxide (DEBROX) 6.5 % otic solution; Place 5 drops in ear(s) 2 times daily, Disp-1 each, R-0Normal           PATIENT REFERRED TO:  Return if symptoms worsen or fail to improve. DISCHARGE MEDICATIONS:  New Prescriptions    AZITHROMYCIN (ZITHROMAX) 250 MG TABLET    Take 1 tablet by mouth See Admin Instructions for 5 days 500mg on day 1 followed by 250mg on days 2 - 5    CARBAMIDE PEROXIDE (DEBROX) 6.5 % OTIC SOLUTION    Place 5 drops in ear(s) 2 times daily     Cannot display discharge medications since this is not an admission.        Mireya Souza, JAMES - CNP    CHIEF COMPLAINT       Chief Complaint   Patient presents with    Ear Problem     R ear pain x2 days          SUBJECTIVE/REVIEW OF SYSTEMS     Review of Systems   Constitutional:  Negative for fatigue and unexpected weight change. HENT:  Positive for congestion, ear pain, sinus pressure, sinus pain and tinnitus. Negative for facial swelling, hearing loss and sneezing. Eyes:  Negative for visual disturbance. Respiratory:  Negative for cough, chest tightness and shortness of breath. Cardiovascular:  Negative for chest pain, palpitations and leg swelling. Gastrointestinal:  Negative for diarrhea and nausea. Endocrine: Negative for polydipsia and polyuria. Genitourinary: Negative. Musculoskeletal: Negative. Skin:  Negative for color change. Neurological:  Negative for weakness, light-headedness, numbness and headaches. Hematological:  Does not bruise/bleed easily. Psychiatric/Behavioral:  Negative for confusion. The patient is not nervous/anxious. OBJECTIVE/PHYSICAL EXAM     Physical Exam  Vitals reviewed. Constitutional:       Appearance: Normal appearance. He is not ill-appearing or toxic-appearing. HENT:      Head: Normocephalic. Right Ear: Tympanic membrane, ear canal and external ear normal. No middle ear effusion. There is no impacted cerumen. No mastoid tenderness. Tympanic membrane is not perforated, erythematous or bulging. Left Ear: Tympanic membrane, ear canal and external ear normal.  No middle ear effusion. There is no impacted cerumen. No mastoid tenderness. Tympanic membrane is not perforated, erythematous or bulging. Nose: No congestion or rhinorrhea. Mouth/Throat:      Lips: Pink. Mouth: Mucous membranes are moist.      Pharynx: Oropharynx is clear. Posterior oropharyngeal erythema present. No pharyngeal swelling or oropharyngeal exudate. Tonsils: No tonsillar exudate or tonsillar abscesses. 3+ on the right. 2+ on the left. Eyes:      General:         Right eye: No discharge.          Left eye: No discharge.   Cardiovascular:      Rate and Rhythm: Normal rate and regular rhythm.      Pulses: Normal pulses.      Heart sounds: Normal heart sounds. No murmur heard.    No gallop.   Pulmonary:      Effort: Pulmonary effort is normal. No respiratory distress.      Breath sounds: Normal breath sounds. No stridor. No wheezing or rhonchi.   Chest:      Chest wall: No tenderness.   Abdominal:      General: Abdomen is flat.   Musculoskeletal:      Cervical back: No rigidity or tenderness.   Lymphadenopathy:      Head:      Right side of head: No submandibular adenopathy.      Left side of head: No submandibular adenopathy.      Cervical: No cervical adenopathy.   Skin:     General: Skin is warm and dry.      Capillary Refill: Capillary refill takes less than 2 seconds.      Coloration: Skin is not pale.   Neurological:      Mental Status: He is alert and oriented to person, place, and time. Mental status is at baseline.   Psychiatric:         Mood and Affect: Mood normal.         Behavior: Behavior normal.       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  CURRENT MEDICATIONS       Previous Medications    AMLODIPINE (NORVASC) 5 MG TABLET    TAKE 1 TABLET BY MOUTH TWICE DAILY FOR 90 DAYS    ASPIRIN 81 MG CHEWABLE TABLET    Take 81 mg by mouth daily    CPAP MACHINE MISC    by NOT APPLICABLE route    DIGOXIN (LANOXIN) 125 MCG TABLET        LOSARTAN-HYDROCHLOROTHIAZIDE (HYZAAR) 50-12.5 MG PER TABLET    TAKE 1 TABLET BY MOUTH ONCE DAILY FOR 90 DAYS    PRAVASTATIN (PRAVACHOL) 40 MG TABLET        RESPIRATORY THERAPY SUPPLIES MASSIEL    New CPAP mask and supplies    TAMSULOSIN (FLOMAX)  0.4 MG CAPSULE    Take 0.4 mg by mouth daily    VERAPAMIL (VERELAN) 120 MG EXTENDED RELEASE CAPSULE    Take 180 mg by mouth     WARFARIN (COUMADIN) 5 MG TABLET    Indications: Atrial Fibrillation Take as directed by [facility] AT [facility]. Quantity for 90 day supply. ALLERGIES     Patient is is allergic to pcn [penicillins] and sulfa antibiotics. FAMILY HISTORY     Patient'sfamily history includes Heart Disease in his father. HISTORY     Patient  reports that he has never smoked. He has never used smokeless tobacco.  READY CARE COURSE     Orders Placed This Encounter   Procedures    POCT rapid strep A        Labs:  Results for POC orders placed in visit on 03/07/23   POCT rapid strep A   Result Value Ref Range    Strep A Ag Positive (A) None Detected     IMAGING:  No orders to display     Scheduled Meds:  Continuous Infusions:  PRN Meds:.

## 2023-03-07 NOTE — PATIENT INSTRUCTIONS
Warning signs of medication interactions with this antibiotic are slowing of heart rate , nausea, vomiting, diarrhea, or vision problems.   Discontinue and notify this office if you begin to experience these symptoms

## 2023-03-22 ENCOUNTER — HOSPITAL ENCOUNTER (OUTPATIENT)
Dept: PHARMACY | Age: 88
Setting detail: THERAPIES SERIES
Discharge: HOME | End: 2023-03-22
Payer: MEDICARE

## 2023-03-22 DIAGNOSIS — I48.0 PAROXYSMAL ATRIAL FIBRILLATION (HCC): Primary | ICD-10-CM

## 2023-03-22 LAB — INTERNATIONAL NORMALIZATION RATIO, POC: 2.7

## 2023-03-22 PROCEDURE — 85610 PROTHROMBIN TIME: CPT

## 2023-03-22 PROCEDURE — 99211 OFF/OP EST MAY X REQ PHY/QHP: CPT

## 2023-03-22 NOTE — PROGRESS NOTES
Mr. Tommie Corrigan is a 82 y.o. y/o male with history of Afib who presents today for anticoagulation monitoring and adjustment.   INR 2.7 is therapeutic for this patient (goal range 2-3) and is reflective of 37.5 mg TWD  Patient verifies current dosing regimen, patient able to verbally recall dose  Patient reports 0  missed doses since last INR   Patient denies s/sx clotting and/or stroke  Patient denies hematuria, epistaxis, rectal bleeding  Patient denies changes in diet, alcohol, or tobacco use  Reviewed medication list and drug allergies with patient, updated any medication additions or modifications accordingly  Patient had 5 day therapy of azithromycin 2 weeks ago, which can slightly elevate INR  Patient also denies any pending medical or dental procedures scheduled at this time  Patient was instructed to continue 37.5 mg TWD and RTC 6 weeks    For Pharmacy Admin Tracking Only    Intervention Detail: Adherence Monitorin  Total # of Interventions Recommended: 1  Total # of Interventions Accepted: 1  Time Spent (min): 15

## 2023-03-29 ENCOUNTER — OFFICE VISIT (OUTPATIENT)
Dept: PULMONOLOGY | Age: 88
End: 2023-03-29
Payer: MEDICARE

## 2023-03-29 VITALS
OXYGEN SATURATION: 97 % | WEIGHT: 193 LBS | HEART RATE: 80 BPM | TEMPERATURE: 97.1 F | DIASTOLIC BLOOD PRESSURE: 58 MMHG | BODY MASS INDEX: 28.5 KG/M2 | SYSTOLIC BLOOD PRESSURE: 116 MMHG

## 2023-03-29 DIAGNOSIS — G47.33 OSA ON CPAP: Primary | ICD-10-CM

## 2023-03-29 DIAGNOSIS — Z99.89 OSA ON CPAP: Primary | ICD-10-CM

## 2023-03-29 DIAGNOSIS — E66.3 OVERWEIGHT (BMI 25.0-29.9): ICD-10-CM

## 2023-03-29 PROCEDURE — 3074F SYST BP LT 130 MM HG: CPT | Performed by: INTERNAL MEDICINE

## 2023-03-29 PROCEDURE — 99214 OFFICE O/P EST MOD 30 MIN: CPT | Performed by: INTERNAL MEDICINE

## 2023-03-29 PROCEDURE — 3078F DIAST BP <80 MM HG: CPT | Performed by: INTERNAL MEDICINE

## 2023-03-29 PROCEDURE — 1123F ACP DISCUSS/DSCN MKR DOCD: CPT | Performed by: INTERNAL MEDICINE

## 2023-03-29 ASSESSMENT — ENCOUNTER SYMPTOMS
NAUSEA: 0
SORE THROAT: 0
SHORTNESS OF BREATH: 0
DIARRHEA: 0
CHEST TIGHTNESS: 0
VOMITING: 0
RHINORRHEA: 0
WHEEZING: 0
ABDOMINAL PAIN: 0
EYE ITCHING: 0
VOICE CHANGE: 0
COUGH: 0

## 2023-03-29 NOTE — PROGRESS NOTES
Subjective:     Susi Tesfaye is a 82 y.o. male who complains today of:     Chief Complaint   Patient presents with    Follow-up     6m f/u on OSVALDO       HPI  He  said he never received new CPAP , never received call from DME. New CPAP order was sent in last visit to Susana Sweeney . Currently he is using old CPAP with  7  centimeters of H2O with heated humidity. He is using CPAP for about  7 hours every night. He is using CPAP with  nasal  Mask. He said  sleep is restful with the CPAP use. He is compliant with CPAP therapy and benefiting with CPAP use. No snoring with CPAP use. No complaint of daytime sleepiness or tiredness with CPAP use. He denies taking naps. No sleepiness with driving. He denies difficulty falling asleep or staying asleep.     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    Tinnitus 06/28/2010     Past Surgical History:   Procedure Laterality Date    CATARACT REMOVAL  2011     Family History   Problem Relation Age of Onset    Heart Disease Father      Social History     Socioeconomic History    Marital status:      Spouse name: Not on file    Number of children: Not on file    Years of education: Not on file    Highest education level: Not on file   Occupational History    Not on file   Tobacco Use    Smoking status: Never    Smokeless tobacco: Never   Substance and Sexual Activity    Alcohol use: Not on file    Drug use: Not on file    Sexual activity: Not on file   Other Topics Concern    Not on file   Social History Narrative    Not on file     Social Determinants of Health     Financial Resource Strain: Low Risk     Difficulty of Paying Living Expenses: Not hard at all   Food Insecurity: No Food Insecurity    Worried About [address] in the Last Year: Never true    [address] in the Last Year: Never

## 2023-04-06 RX ORDER — WARFARIN SODIUM 5 MG/1
TABLET ORAL
Qty: 100 TABLET | Refills: 1 | Status: SHIPPED | OUTPATIENT
Start: 2023-04-06

## 2023-04-21 ENCOUNTER — TELEPHONE (OUTPATIENT)
Dept: FAMILY MEDICINE CLINIC | Age: 88
End: 2023-04-21

## 2023-05-03 ENCOUNTER — HOSPITAL ENCOUNTER (OUTPATIENT)
Dept: PHARMACY | Age: 88
Setting detail: THERAPIES SERIES
Discharge: HOME | End: 2023-05-03
Payer: MEDICARE

## 2023-05-03 DIAGNOSIS — I48.0 PAROXYSMAL ATRIAL FIBRILLATION (HCC): Primary | ICD-10-CM

## 2023-05-03 LAB — INTERNATIONAL NORMALIZATION RATIO, POC: 2.3

## 2023-05-03 PROCEDURE — 85610 PROTHROMBIN TIME: CPT

## 2023-05-03 PROCEDURE — 99211 OFF/OP EST MAY X REQ PHY/QHP: CPT

## 2023-05-03 NOTE — PROGRESS NOTES
Mr. Lisa Glover is a 82 y.o. y/o male with history of Afib who presents today for anticoagulation monitoring and adjustment.   INR 2.3 is therapeutic for this patient (goal range 2-3) and is reflective of 37.5 mg TWD  Patient verifies current dosing regimen, patient able to verbally recall dose  Patient reports 0  missed doses since last INR   Patient denies s/sx clotting and/or stroke  Patient denies hematuria, epistaxis, rectal bleeding  Patient denies changes in diet, alcohol, or tobacco use  Reviewed medication list and drug allergies with patient, updated any medication additions or modifications accordingly  Patient also denies any pending medical or dental procedures scheduled at this time  Patient was instructed to continue 37.5 mg TWD and RTC 6 weeks  For Pharmacy Admin Tracking Only    Intervention Detail: Adherence Monitorin  Total # of Interventions Recommended: 1  Total # of Interventions Accepted: 1  Time Spent (min): 15

## 2023-06-04 ENCOUNTER — OFFICE VISIT (OUTPATIENT)
Dept: FAMILY MEDICINE CLINIC | Age: 88
End: 2023-06-04

## 2023-06-04 VITALS
SYSTOLIC BLOOD PRESSURE: 138 MMHG | OXYGEN SATURATION: 95 % | WEIGHT: 194 LBS | TEMPERATURE: 99.4 F | HEIGHT: 69 IN | HEART RATE: 99 BPM | DIASTOLIC BLOOD PRESSURE: 70 MMHG | BODY MASS INDEX: 28.73 KG/M2

## 2023-06-04 DIAGNOSIS — M79.641 RIGHT HAND PAIN: ICD-10-CM
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
## 2023-06-04 DIAGNOSIS — S61.451A OPEN WOUND OF RIGHT HAND DUE TO CAT BITE: Primary | ICD-10-CM

## 2023-06-04 RX ORDER — CLINDAMYCIN HYDROCHLORIDE 300 MG/1
300 CAPSULE ORAL 3 TIMES DAILY
Qty: 21 CAPSULE | Refills: 0 | Status: SHIPPED | OUTPATIENT
Start: 2023-06-04 | End: 2023-06-11

## 2023-06-04 RX ORDER — CEPHALEXIN 500 MG/1
500 CAPSULE ORAL 2 TIMES DAILY
Qty: 14 CAPSULE | Refills: 0 | Status: SHIPPED | OUTPATIENT
Start: 2023-06-04 | End: 2023-06-11

## 2023-06-04 ASSESSMENT — ENCOUNTER SYMPTOMS
SINUS PAIN: 0
NAUSEA: 0
RHINORRHEA: 0
COLOR CHANGE: 0
WHEEZING: 0
ABDOMINAL PAIN: 0
TROUBLE SWALLOWING: 0
VOMITING: 0
COUGH: 0
ABDOMINAL DISTENTION: 0
CONSTIPATION: 0
SHORTNESS OF BREATH: 0
SORE THROAT: 0
DIARRHEA: 0
SINUS PRESSURE: 0
CHEST TIGHTNESS: 0

## 2023-06-06 ENCOUNTER — HOSPITAL ENCOUNTER (OUTPATIENT)
Dept: LAB | Age: 88
Discharge: HOME | End: 2023-06-06
Payer: MEDICARE

## 2023-06-06 LAB
ALBUMIN SERPL-MCNC: 4.1 G/DL (ref 3.5–4.6)
ALP SERPL-CCNC: 74 U/L (ref 35–104)
ALT SERPL-CCNC: 15 U/L (ref 0–41)
ANION GAP SERPL CALCULATED.3IONS-SCNC: 12 MEQ/L (ref 9–15)
AST SERPL-CCNC: 24 U/L (ref 0–40)
BACTERIA URNS QL MICRO: NEGATIVE /HPF
BASOPHILS # BLD: 0 K/UL (ref 0–0.2)
BASOPHILS NFR BLD: 0.4 %
BILIRUB SERPL-MCNC: 1 MG/DL (ref 0.2–0.7)
BILIRUB UR QL STRIP: NEGATIVE
BUN SERPL-MCNC: 21 MG/DL (ref 8–23)
CALCIUM SERPL-MCNC: 8.9 MG/DL (ref 8.5–9.9)
CHLORIDE SERPL-SCNC: 102 MEQ/L (ref 95–107)
CHOLEST SERPL-MCNC: 134 MG/DL (ref 0–199)
CLARITY UR: CLEAR
CO2 SERPL-SCNC: 25 MEQ/L (ref 20–31)
COLOR UR: YELLOW
CREAT SERPL-MCNC: 0.96 MG/DL (ref 0.7–1.2)
EOSINOPHIL # BLD: 0 K/UL (ref 0–0.7)
EOSINOPHIL NFR BLD: 0.6 %
EPI CELLS #/AREA URNS AUTO: ABNORMAL /HPF (ref 0–5)
ERYTHROCYTE [DISTWIDTH] IN BLOOD BY AUTOMATED COUNT: 13.6 % (ref 11.5–14.5)
GLOBULIN SER CALC-MCNC: 3.5 G/DL (ref 2.3–3.5)
GLUCOSE SERPL-MCNC: 96 MG/DL (ref 70–99)
GLUCOSE UR STRIP-MCNC: NEGATIVE MG/DL
HCT VFR BLD AUTO: 41.7 % (ref 42–52)
HDLC SERPL-MCNC: 62 MG/DL (ref 40–59)
HGB BLD-MCNC: 14.5 G/DL (ref 14–18)
HGB UR QL STRIP: ABNORMAL
HYALINE CASTS #/AREA URNS AUTO: ABNORMAL /HPF (ref 0–5)
KETONES UR STRIP-MCNC: NEGATIVE MG/DL
LDLC SERPL CALC-MCNC: 62 MG/DL (ref 0–129)
LEUKOCYTE ESTERASE UR QL STRIP: NEGATIVE
LYMPHOCYTES # BLD: 1.9 K/UL (ref 1–4.8)
LYMPHOCYTES NFR BLD: 24.7 %
MCH RBC QN AUTO: 35.5 PG (ref 27–31.3)
MCHC RBC AUTO-ENTMCNC: 34.8 % (ref 33–37)
MCV RBC AUTO: 102 FL (ref 79–92.2)
MONOCYTES # BLD: 1.1 K/UL (ref 0.2–0.8)
MONOCYTES NFR BLD: 13.8 %
NEUTROPHILS # BLD: 4.8 K/UL (ref 1.4–6.5)
NEUTS SEG NFR BLD: 60.5 %
NITRITE UR QL STRIP: NEGATIVE
PH UR STRIP: 6.5 [PH] (ref 5–9)
PLATELET # BLD AUTO: 159 K/UL (ref 130–400)
POTASSIUM SERPL-SCNC: 3.9 MEQ/L (ref 3.4–4.9)
PROT SERPL-MCNC: 7.6 G/DL (ref 6.3–8)
PROT UR STRIP-MCNC: 100 MG/DL
RBC # BLD AUTO: 4.09 M/UL (ref 4.7–6.1)
RBC #/AREA URNS AUTO: ABNORMAL /HPF (ref 0–5)
SODIUM SERPL-SCNC: 139 MEQ/L (ref 135–144)
SP GR UR STRIP: 1.02 (ref 1–1.03)
TRIGL SERPL-MCNC: 48 MG/DL (ref 0–150)
TSH SERPL-MCNC: 3.15 UIU/ML (ref 0.44–3.86)
UROBILINOGEN UR STRIP-ACNC: 1 E.U./DL
VITAMIN D 25-HYDROXY: 14.1 NG/ML
WBC # BLD AUTO: 7.9 K/UL (ref 4.8–10.8)
WBC #/AREA URNS AUTO: ABNORMAL /HPF (ref 0–5)

## 2023-06-06 PROCEDURE — 85025 COMPLETE CBC W/AUTO DIFF WBC: CPT

## 2023-06-06 PROCEDURE — 81001 URINALYSIS AUTO W/SCOPE: CPT

## 2023-06-06 PROCEDURE — 82306 VITAMIN D 25 HYDROXY: CPT

## 2023-06-06 PROCEDURE — 80061 LIPID PANEL: CPT

## 2023-06-06 PROCEDURE — 80053 COMPREHEN METABOLIC PANEL: CPT

## 2023-06-06 PROCEDURE — 84443 ASSAY THYROID STIM HORMONE: CPT

## 2023-06-06 PROCEDURE — 36415 COLL VENOUS BLD VENIPUNCTURE: CPT

## 2023-06-21 ENCOUNTER — HOSPITAL ENCOUNTER (OUTPATIENT)
Dept: PHARMACY | Age: 88
Setting detail: THERAPIES SERIES
Discharge: HOME | End: 2023-06-21
Payer: MEDICARE

## 2023-06-21 DIAGNOSIS — I48.0 PAROXYSMAL ATRIAL FIBRILLATION (HCC): Primary | ICD-10-CM

## 2023-06-21 LAB — INTERNATIONAL NORMALIZATION RATIO, POC: 1.7

## 2023-06-21 PROCEDURE — 99211 OFF/OP EST MAY X REQ PHY/QHP: CPT

## 2023-06-21 PROCEDURE — 85610 PROTHROMBIN TIME: CPT

## 2023-06-21 NOTE — PROGRESS NOTES
Mr. Lori Viera is a 82 y.o. y/o male with history of Afib who presents today for anticoagulation montoring and adjustment.   INR 1.7 is sub therapeutic for this patient (goal range 2-3) and is reflective of 37.5 mg TWD  Patient verifies current dosing regimen, patient able to verbally recall dose  Patient reports 0  missed doses since last INR   Patient denies s/sx clotting and/or stroke  Patient denies hematuria, epistaxis, rectal bleeding  Patient denies changes in diet, alcohol, or tobacco use  Reviewed medication list and drug allergies with patient, updated any medication additions or modifications accordingly  Patient also denies any pending medical or dental procedures scheduled at this time  Previous INR has been in range, but at low end (2.0 for 3 of last 5 INR checks)  Patient was instructed to boost today to 7.5 mg, then increase to  40 mg TWD (6.7%) and RTC 3 weeks (usual 6 weeks)  For Pharmacy Admin Tracking Only    Intervention Detail: Dose Adjustment: 1, reason: Therapy Optimization  Total # of Interventions Recommended: 1  Total # of Interventions Accepted: 2  Time Spent (min): 15

## 2023-07-05 ENCOUNTER — OFFICE VISIT (OUTPATIENT)
Dept: PULMONOLOGY | Age: 88
End: 2023-07-05
Payer: MEDICARE

## 2023-07-05 VITALS
OXYGEN SATURATION: 98 % | WEIGHT: 194 LBS | DIASTOLIC BLOOD PRESSURE: 68 MMHG | BODY MASS INDEX: 28.65 KG/M2 | HEART RATE: 78 BPM | SYSTOLIC BLOOD PRESSURE: 124 MMHG

## 2023-07-05 DIAGNOSIS — E66.3 OVERWEIGHT (BMI 25.0-29.9): ICD-10-CM

## 2023-07-05 DIAGNOSIS — Z99.89 OSA ON CPAP: Primary | ICD-10-CM

## 2023-07-05 DIAGNOSIS — G47.33 OSA ON CPAP: Primary | ICD-10-CM

## 2023-07-05 DIAGNOSIS — R09.81 NASAL CONGESTION: ICD-10-CM

## 2023-07-05 PROCEDURE — 1123F ACP DISCUSS/DSCN MKR DOCD: CPT | Performed by: INTERNAL MEDICINE

## 2023-07-05 PROCEDURE — 99214 OFFICE O/P EST MOD 30 MIN: CPT | Performed by: INTERNAL MEDICINE

## 2023-07-05 ASSESSMENT — ENCOUNTER SYMPTOMS
VOMITING: 0
WHEEZING: 0
CHEST TIGHTNESS: 0
VOICE CHANGE: 0
EYE ITCHING: 0
NAUSEA: 0
ABDOMINAL PAIN: 0
SORE THROAT: 0
SHORTNESS OF BREATH: 0
COUGH: 0
DIARRHEA: 0
RHINORRHEA: 0

## 2023-07-12 ENCOUNTER — HOSPITAL ENCOUNTER (OUTPATIENT)
Dept: PHARMACY | Age: 88
Setting detail: THERAPIES SERIES
Discharge: HOME | End: 2023-07-12
Payer: MEDICARE

## 2023-07-12 DIAGNOSIS — I48.0 PAROXYSMAL ATRIAL FIBRILLATION (HCC): Primary | ICD-10-CM

## 2023-07-12 LAB — INTERNATIONAL NORMALIZATION RATIO, POC: 3.2

## 2023-07-12 PROCEDURE — 99211 OFF/OP EST MAY X REQ PHY/QHP: CPT

## 2023-07-12 PROCEDURE — 85610 PROTHROMBIN TIME: CPT

## 2023-07-12 NOTE — PROGRESS NOTES
Mr. Mainor Smith is a 82 y.o. y/o male with history of Afib who presents today for anticoagulation monitoring and adjustment.   INR 3.2 is supra therapeutic for this patient (goal range 2-3) and is reflective of 40 mg TWD  Patient verifies current dosing regimen, patient able to verbally recall dose  Patient reports 0  missed doses since last INR   Patient denies s/sx clotting and/or stroke  Patient denies hematuria, epistaxis, rectal bleeding  Patient denies changes in diet, alcohol, or tobacco use  Reviewed medication list and drug allergies with patient, updated any medication additions or modifications accordingly  Patient also denies any pending medical or dental procedures scheduled at this time  Patient was instructed to decrease today's dose to 2.5 mg (usual 5 mg), then decrease to 37.5 mg TWD (6.2%) and RTC 4 weeks (usual 6)    For Pharmacy Admin Tracking Only    Intervention Detail: Dose Adjustment: 1, reason: Therapy Optimization  Total # of Interventions Recommended: 2  Total # of Interventions Accepted: 2  Time Spent (min): 15

## 2023-08-10 ENCOUNTER — TELEPHONE (OUTPATIENT)
Dept: PHARMACY | Age: 88
End: 2023-08-10

## 2023-08-10 DIAGNOSIS — I48.91 ATRIAL FIBRILLATION, UNSPECIFIED TYPE (HCC): Primary | ICD-10-CM

## 2023-08-10 NOTE — TELEPHONE ENCOUNTER
Called to set up an appointment for [facility] no show, no answer.  Set tracker for 7 days outs Conveyed results/recommendations to patient. Patient verbalized understanding and is agreeable to plan. No further questions at this time.

## 2023-08-16 ENCOUNTER — HOSPITAL ENCOUNTER (OUTPATIENT)
Dept: PHARMACY | Age: 88
Setting detail: THERAPIES SERIES
Discharge: HOME | End: 2023-08-16
Payer: MEDICARE

## 2023-08-16 DIAGNOSIS — I48.0 PAROXYSMAL ATRIAL FIBRILLATION (HCC): Primary | ICD-10-CM

## 2023-08-16 LAB — INTERNATIONAL NORMALIZATION RATIO, POC: 2.6

## 2023-08-16 PROCEDURE — 99211 OFF/OP EST MAY X REQ PHY/QHP: CPT

## 2023-08-16 PROCEDURE — 85610 PROTHROMBIN TIME: CPT

## 2023-08-16 NOTE — PROGRESS NOTES
Mr. Cherelle Aguirre is a 82 y.o. y/o male with history of Afib who presents today for anticoagulation monitoring and adjustment.   INR 2.6 is therapeutic for this patient (goal range 2-3) and is reflective of 37.5 mg TWD  Patient verifies current dosing regimen, patient able to verbally recall dose  Patient reports 0  missed doses since last INR   Patient denies s/sx clotting and/or stroke  Patient denies hematuria, epistaxis, rectal bleeding  Patient denies changes in diet, alcohol, or tobacco use  Reviewed medication list and drug allergies with patient, updated any medication additions or modifications accordingly  Patient also denies any pending medical or dental procedures scheduled at this time  Patient was instructed to continue 37.5 mg TWD and RTC 6 weeks  For Pharmacy Admin Tracking Only    Intervention Detail: Adherence Monitorin  Total # of Interventions Recommended: 1  Total # of Interventions Accepted: 1  Time Spent (min): 15

## 2023-09-26 ENCOUNTER — HOSPITAL ENCOUNTER (OUTPATIENT)
Dept: LAB | Age: 88
Discharge: HOME | End: 2023-09-26
Payer: MEDICARE

## 2023-09-26 LAB
ANION GAP SERPL CALCULATED.3IONS-SCNC: 12 MEQ/L (ref 9–15)
BUN SERPL-MCNC: 21 MG/DL (ref 8–23)
CALCIUM SERPL-MCNC: 9.2 MG/DL (ref 8.5–9.9)
CHLORIDE SERPL-SCNC: 99 MEQ/L (ref 95–107)
CHOLEST SERPL-MCNC: 141 MG/DL (ref 0–199)
CK SERPL-CCNC: 86 U/L (ref 0–190)
CO2 SERPL-SCNC: 26 MEQ/L (ref 20–31)
CREAT SERPL-MCNC: 0.98 MG/DL (ref 0.7–1.2)
DIGOXIN SERPL-MCNC: 0.6 NG/ML (ref 0.8–2)
GLUCOSE SERPL-MCNC: 100 MG/DL (ref 70–99)
HDLC SERPL-MCNC: 58 MG/DL (ref 40–59)
LDLC SERPL CALC-MCNC: 67 MG/DL (ref 0–129)
MAGNESIUM SERPL-MCNC: 2.5 MG/DL (ref 1.7–2.4)
POTASSIUM SERPL-SCNC: 3.6 MEQ/L (ref 3.4–4.9)
SODIUM SERPL-SCNC: 137 MEQ/L (ref 135–144)
TRIGL SERPL-MCNC: 80 MG/DL (ref 0–150)

## 2023-09-26 PROCEDURE — 82550 ASSAY OF CK (CPK): CPT

## 2023-09-26 PROCEDURE — 83735 ASSAY OF MAGNESIUM: CPT

## 2023-09-26 PROCEDURE — 36415 COLL VENOUS BLD VENIPUNCTURE: CPT

## 2023-09-26 PROCEDURE — 80048 BASIC METABOLIC PNL TOTAL CA: CPT

## 2023-09-26 PROCEDURE — 80061 LIPID PANEL: CPT

## 2023-09-26 PROCEDURE — 80162 ASSAY OF DIGOXIN TOTAL: CPT

## 2023-09-27 ENCOUNTER — HOSPITAL ENCOUNTER (OUTPATIENT)
Dept: PHARMACY | Age: 88
Setting detail: THERAPIES SERIES
Discharge: HOME | End: 2023-09-27
Payer: MEDICARE

## 2023-09-27 DIAGNOSIS — I48.0 PAROXYSMAL ATRIAL FIBRILLATION (HCC): Primary | ICD-10-CM

## 2023-09-27 LAB — INTERNATIONAL NORMALIZATION RATIO, POC: 1.7

## 2023-09-27 PROCEDURE — 99211 OFF/OP EST MAY X REQ PHY/QHP: CPT

## 2023-09-27 PROCEDURE — 85610 PROTHROMBIN TIME: CPT

## 2023-09-27 NOTE — PROGRESS NOTES
Mr. Brionna Mena is a 82 y.o. y/o male with history of Afib who presents today for anticoagulation monitoring and adjustment.   INR 1.7 is sub therapeutic for this patient (goal range 2-3) and is reflective of 37.5 mg TWD  Patient verifies current dosing regimen, patient able to verbally recall dose  Patient reports 0  missed doses since last INR   Patient denies s/sx clotting and/or stroke  Patient denies hematuria, epistaxis, rectal bleeding  Patient denies changes in diet, alcohol, or tobacco use  Reviewed medication list and drug allergies with patient, updated any medication additions or modifications accordingly  Patient also denies any pending medical or dental procedures scheduled at this time  Patient has been stable in range on 37.5 mg TWD for many years  Patient was instructed to boost today's dose to 7.5 mg, then resume 37.5 mg TWD and RTC 4 weeks (usual 6 weeks)  For Pharmacy Admin Tracking Only    Intervention Detail: Dose Adjustment: 1, reason: Therapy Optimization  Total # of Interventions Recommended: 2  Total # of Interventions Accepted: 2  Time Spent (min): 15

## 2023-10-25 ENCOUNTER — HOSPITAL ENCOUNTER (OUTPATIENT)
Dept: PHARMACY | Age: 88
Setting detail: THERAPIES SERIES
Discharge: HOME | End: 2023-10-25
Payer: MEDICARE

## 2023-10-25 DIAGNOSIS — I48.0 PAROXYSMAL ATRIAL FIBRILLATION (HCC): Primary | ICD-10-CM

## 2023-10-25 LAB — INTERNATIONAL NORMALIZATION RATIO, POC: 1.9

## 2023-10-25 PROCEDURE — 99211 OFF/OP EST MAY X REQ PHY/QHP: CPT

## 2023-10-25 PROCEDURE — 85610 PROTHROMBIN TIME: CPT

## 2023-10-25 RX ORDER — WARFARIN SODIUM 5 MG/1
TABLET ORAL
Qty: 100 TABLET | Refills: 1 | Status: SHIPPED | OUTPATIENT
Start: 2023-10-25

## 2023-10-25 NOTE — PROGRESS NOTES
Mr. Tom Black is a 82 y.o. y/o male with history of Afib who presents today for anticoagulation monitoring and adjustment.   INR 1.9 is sub therapeutic for this patient (goal range 2-3) and is reflective of 37.5 mg TWD  Patient verifies current dosing regimen, patient able to verbally recall dose  Patient reports 0  missed doses since last INR   Patient denies s/sx clotting and/or stroke  Patient denies hematuria, epistaxis, rectal bleeding  Patient denies changes in diet, alcohol, or tobacco use  Reviewed medication list and drug allergies with patient, updated any medication additions or modifications accordingly  Patient also denies any pending medical or dental procedures scheduled at this time  Patient was instructed to boost today to 7.5 mg (usual 5 mg), then resume 37.5 mg TWD and RTC 6 weeks  For Pharmacy Admin Tracking Only    Intervention Detail: Dose Adjustment: 1, reason: Therapy Optimization  Total # of Interventions Recommended: 2  Total # of Interventions Accepted: 2  Time Spent (min): 15

## 2023-11-28 ENCOUNTER — HOSPITAL ENCOUNTER (OUTPATIENT)
Dept: LAB | Age: 88
Discharge: HOME | End: 2023-11-28
Payer: MEDICARE

## 2023-11-28 LAB
ALBUMIN SERPL-MCNC: 4.1 G/DL (ref 3.5–4.6)
ALP SERPL-CCNC: 81 U/L (ref 35–104)
ALT SERPL-CCNC: 16 U/L (ref 0–41)
ANION GAP SERPL CALCULATED.3IONS-SCNC: 12 MEQ/L (ref 9–15)
AST SERPL-CCNC: 22 U/L (ref 0–40)
BASOPHILS # BLD: 0 K/UL (ref 0–0.2)
BASOPHILS NFR BLD: 0.4 %
BILIRUB SERPL-MCNC: 0.8 MG/DL (ref 0.2–0.7)
BUN SERPL-MCNC: 21 MG/DL (ref 8–23)
CALCIUM SERPL-MCNC: 9.2 MG/DL (ref 8.5–9.9)
CHLORIDE SERPL-SCNC: 104 MEQ/L (ref 95–107)
CHOLEST SERPL-MCNC: 146 MG/DL (ref 0–199)
CO2 SERPL-SCNC: 26 MEQ/L (ref 20–31)
CREAT SERPL-MCNC: 0.96 MG/DL (ref 0.7–1.2)
EOSINOPHIL # BLD: 0.1 K/UL (ref 0–0.7)
EOSINOPHIL NFR BLD: 0.8 %
ERYTHROCYTE [DISTWIDTH] IN BLOOD BY AUTOMATED COUNT: 13.2 % (ref 11.5–14.5)
GLOBULIN SER CALC-MCNC: 3.3 G/DL (ref 2.3–3.5)
GLUCOSE SERPL-MCNC: 99 MG/DL (ref 70–99)
HCT VFR BLD AUTO: 44.7 % (ref 42–52)
HDLC SERPL-MCNC: 57 MG/DL (ref 40–59)
HGB BLD-MCNC: 15.1 G/DL (ref 14–18)
IRON SATURATION: 35 % (ref 20–55)
IRON: 102 UG/DL (ref 59–158)
LDLC SERPL CALC-MCNC: 76 MG/DL (ref 0–129)
LYMPHOCYTES # BLD: 2.4 K/UL (ref 1–4.8)
LYMPHOCYTES NFR BLD: 26.7 %
MCH RBC QN AUTO: 34.9 PG (ref 27–31.3)
MCHC RBC AUTO-ENTMCNC: 33.8 % (ref 33–37)
MCV RBC AUTO: 103.2 FL (ref 79–92.2)
MONOCYTES # BLD: 0.9 K/UL (ref 0.2–0.8)
MONOCYTES NFR BLD: 9.6 %
NEUTROPHILS # BLD: 5.6 K/UL (ref 1.4–6.5)
NEUTS SEG NFR BLD: 62.1 %
PLATELET # BLD AUTO: 191 K/UL (ref 130–400)
POTASSIUM SERPL-SCNC: 3.8 MEQ/L (ref 3.4–4.9)
PROT SERPL-MCNC: 7.4 G/DL (ref 6.3–8)
RBC # BLD AUTO: 4.33 M/UL (ref 4.7–6.1)
SODIUM SERPL-SCNC: 142 MEQ/L (ref 135–144)
TOTAL IRON BINDING CAPACITY: 288 UG/DL (ref 250–450)
TRIGL SERPL-MCNC: 63 MG/DL (ref 0–150)
TSH SERPL-MCNC: 2.84 UIU/ML (ref 0.44–3.86)
UNSATURATED IRON BINDING CAPACITY: 186 UG/DL (ref 112–347)
WBC # BLD AUTO: 9.1 K/UL (ref 4.8–10.8)

## 2023-11-28 PROCEDURE — 84443 ASSAY THYROID STIM HORMONE: CPT

## 2023-11-28 PROCEDURE — 84153 ASSAY OF PSA TOTAL: CPT

## 2023-11-28 PROCEDURE — 80061 LIPID PANEL: CPT

## 2023-11-28 PROCEDURE — 85025 COMPLETE CBC W/AUTO DIFF WBC: CPT

## 2023-11-28 PROCEDURE — 83540 ASSAY OF IRON: CPT

## 2023-11-28 PROCEDURE — 84154 ASSAY OF PSA FREE: CPT

## 2023-11-28 PROCEDURE — 80053 COMPREHEN METABOLIC PANEL: CPT

## 2023-11-28 PROCEDURE — 83550 IRON BINDING TEST: CPT

## 2023-11-28 PROCEDURE — 36415 COLL VENOUS BLD VENIPUNCTURE: CPT

## 2023-11-29 LAB
PSA FREE MFR SERPL: 18.8 %
PSA FREE SERPL-MCNC: 0.3 UG/L
PSA SERPL-MCNC: 1.6 UG/L (ref 0–4)

## 2023-12-06 ENCOUNTER — HOSPITAL ENCOUNTER (OUTPATIENT)
Dept: PHARMACY | Age: 88
Setting detail: THERAPIES SERIES
Discharge: HOME | End: 2023-12-06
Payer: MEDICARE

## 2023-12-06 DIAGNOSIS — I48.0 PAROXYSMAL ATRIAL FIBRILLATION (HCC): Primary | ICD-10-CM

## 2023-12-06 LAB — INTERNATIONAL NORMALIZATION RATIO, POC: 1.9

## 2023-12-06 PROCEDURE — 85610 PROTHROMBIN TIME: CPT

## 2023-12-06 PROCEDURE — 99211 OFF/OP EST MAY X REQ PHY/QHP: CPT

## 2023-12-06 NOTE — PROGRESS NOTES
Mr. Deb Clements is a 82 y.o. y/o male with history of Afib who presents today for anticoagulation monitoring and adjustment.   INR 1.9 is sub therapeutic for this patient (goal range 2-3) and is reflective of 37.5 mg TWD  Patient verifies current dosing regimen, patient able to verbally recall dose  Patient reports 0  missed doses since last INR   Patient denies s/sx clotting and/or stroke  Patient denies hematuria, epistaxis, rectal bleeding  Patient denies changes in diet, alcohol, or tobacco use  Reviewed medication list and drug allergies with patient, updated any medication additions or modifications accordingly  Patient also denies any pending medical or dental procedures scheduled at this time  Last INR check was 1.9  Patient was instructed to boost warfarin to 40 mg TWD and RTC 3 weeks (usual 6 weeks)  For Pharmacy Admin Tracking Only    Intervention Detail: Dose Adjustment: 1, reason: Therapy Optimization  Total # of Interventions Recommended: 2  Total # of Interventions Accepted: 2  Time Spent (min): 15

## 2023-12-22 PROBLEM — Z98.42 HISTORY OF YAG LASER CAPSULOTOMY OF LENS OF LEFT EYE: Status: ACTIVE | Noted: 2020-11-18

## 2023-12-22 PROBLEM — R06.83 SNORING: Status: ACTIVE | Noted: 2023-12-22

## 2023-12-22 PROBLEM — I48.0 PAROXYSMAL ATRIAL FIBRILLATION (MULTI): Status: ACTIVE | Noted: 2023-12-22

## 2023-12-22 PROBLEM — I27.20 PULMONARY HYPERTENSION (MULTI): Status: ACTIVE | Noted: 2023-12-22

## 2023-12-22 PROBLEM — G47.33 OSA ON CPAP: Status: ACTIVE | Noted: 2017-09-22

## 2023-12-22 PROBLEM — I47.10 PSVT (PAROXYSMAL SUPRAVENTRICULAR TACHYCARDIA) (CMS-HCC): Status: ACTIVE | Noted: 2023-12-22

## 2023-12-22 PROBLEM — H04.123 DRY EYE SYNDROME, BILATERAL: Status: ACTIVE | Noted: 2022-01-05

## 2023-12-22 PROBLEM — E78.5 HYPERLIPIDEMIA: Status: ACTIVE | Noted: 2023-12-22

## 2023-12-22 PROBLEM — H35.89 RPE MOTTLING OF MACULA: Status: ACTIVE | Noted: 2019-10-28

## 2023-12-22 PROBLEM — I25.10 CAD (CORONARY ARTERY DISEASE): Status: ACTIVE | Noted: 2023-12-22

## 2023-12-22 PROBLEM — G47.33 OBSTRUCTIVE SLEEP APNEA, ADULT: Status: ACTIVE | Noted: 2023-12-22

## 2023-12-22 PROBLEM — I10 HYPERTENSION: Status: ACTIVE | Noted: 2017-09-22

## 2023-12-22 PROBLEM — I07.1 TRICUSPID REGURGITATION: Status: ACTIVE | Noted: 2023-12-22

## 2023-12-22 PROBLEM — H26.492 POSTERIOR CAPSULAR OPACIFICATION VISUALLY SIGNIFICANT OF LEFT EYE: Status: ACTIVE | Noted: 2018-01-10

## 2023-12-22 PROBLEM — E66.3 OVERWEIGHT (BMI 25.0-29.9): Status: ACTIVE | Noted: 2023-07-05

## 2023-12-22 PROBLEM — R09.02 HYPOXIA: Status: ACTIVE | Noted: 2023-12-22

## 2023-12-22 RX ORDER — VERAPAMIL HYDROCHLORIDE 180 MG/1
180 TABLET, FILM COATED, EXTENDED RELEASE ORAL NIGHTLY
COMMUNITY
Start: 2020-10-02 | End: 2024-01-09

## 2023-12-22 RX ORDER — WARFARIN SODIUM 5 MG/1
5 TABLET ORAL
COMMUNITY
Start: 2007-05-16

## 2023-12-22 RX ORDER — LOSARTAN POTASSIUM AND HYDROCHLOROTHIAZIDE 12.5; 5 MG/1; MG/1
1 TABLET ORAL DAILY
COMMUNITY
End: 2024-01-09

## 2023-12-22 RX ORDER — TAMSULOSIN HYDROCHLORIDE 0.4 MG/1
1 CAPSULE ORAL DAILY
COMMUNITY
Start: 2020-09-04

## 2023-12-22 RX ORDER — DIGOXIN 125 MCG
125 TABLET ORAL DAILY
COMMUNITY
Start: 2020-09-03 | End: 2024-01-09 | Stop reason: SDUPTHER

## 2023-12-22 RX ORDER — PRAVASTATIN SODIUM 40 MG/1
40 TABLET ORAL NIGHTLY
COMMUNITY
Start: 2020-09-04 | End: 2024-05-28 | Stop reason: SDUPTHER

## 2023-12-22 RX ORDER — AMLODIPINE BESYLATE 5 MG/1
1 TABLET ORAL NIGHTLY
COMMUNITY
End: 2024-05-28 | Stop reason: SDUPTHER

## 2023-12-22 RX ORDER — ASPIRIN 81 MG/1
1 TABLET ORAL DAILY
COMMUNITY

## 2023-12-27 ENCOUNTER — HOSPITAL ENCOUNTER (OUTPATIENT)
Dept: PHARMACY | Age: 88
Setting detail: THERAPIES SERIES
Discharge: HOME | End: 2023-12-27
Payer: MEDICARE

## 2023-12-27 DIAGNOSIS — I48.0 PAROXYSMAL ATRIAL FIBRILLATION (HCC): Primary | ICD-10-CM

## 2023-12-27 LAB — INTERNATIONAL NORMALIZATION RATIO, POC: 3.6

## 2023-12-27 PROCEDURE — 99211 OFF/OP EST MAY X REQ PHY/QHP: CPT

## 2023-12-27 PROCEDURE — 85610 PROTHROMBIN TIME: CPT

## 2023-12-27 NOTE — PROGRESS NOTES
Mr. Danitza Bess is a 82 y.o. y/o male with history of Afib who presents today for anticoagulation monitoring and adjustment.   INR 3.6 is supra therapeutic for this patient (goal range 2-3) and is reflective of 40 mg TWD  Patient verifies current dosing regimen, patient able to verbally recall dose  Patient reports 0  missed doses since last INR   Patient denies s/sx clotting and/or stroke  Patient denies hematuria, epistaxis, rectal bleeding  Patient denies changes in diet, alcohol, or tobacco use  Reviewed medication list and drug allergies with patient, updated any medication additions or modifications accordingly  Patient also denies any pending medical or dental procedures scheduled at this time  Patient was instructed to hold warfarin today, then resume 40 mg TWD and RTC 3 weeks (usual 6 weeks)  For Pharmacy Admin Tracking Only    Intervention Detail: Dose Adjustment: 1, reason: Therapy Optimization  Total # of Interventions Recommended: 1  Total # of Interventions Accepted: 1  Time Spent (min): 15

## 2024-01-02 ENCOUNTER — APPOINTMENT (OUTPATIENT)
Dept: GENERAL RADIOLOGY | Age: 89
DRG: 291 | End: 2024-01-02
Payer: MEDICARE

## 2024-01-02 ENCOUNTER — HOSPITAL ENCOUNTER (INPATIENT)
Age: 89
LOS: 1 days | Discharge: HOME | DRG: 291 | End: 2024-01-04
Attending: EMERGENCY MEDICINE | Admitting: INTERNAL MEDICINE
Payer: MEDICARE

## 2024-01-02 DIAGNOSIS — I50.43 CHF (CONGESTIVE HEART FAILURE), NYHA CLASS I, ACUTE ON CHRONIC, COMBINED (HCC): ICD-10-CM

## 2024-01-02 DIAGNOSIS — R68.83 CHILLS: ICD-10-CM

## 2024-01-02 DIAGNOSIS — R06.00 DYSPNEA, UNSPECIFIED TYPE: Primary | ICD-10-CM

## 2024-01-02 DIAGNOSIS — R53.1 GENERAL WEAKNESS: ICD-10-CM

## 2024-01-02 LAB
ANION GAP SERPL CALCULATED.3IONS-SCNC: 13 MEQ/L (ref 9–15)
APTT PPP: 35.9 SEC (ref 24.4–36.8)
BACTERIA URNS QL MICRO: ABNORMAL /HPF
BASOPHILS # BLD: 0.1 K/UL (ref 0–0.1)
BASOPHILS NFR BLD: 0.6 % (ref 0.2–1.2)
BILIRUB UR QL STRIP: NEGATIVE
BNP BLD-MCNC: 1018 PG/ML
BUN SERPL-MCNC: 19 MG/DL (ref 8–23)
CALCIUM SERPL-MCNC: 9.1 MG/DL (ref 8.5–9.9)
CHLORIDE SERPL-SCNC: 106 MEQ/L (ref 95–107)
CLARITY UR: CLEAR
CO2 SERPL-SCNC: 24 MEQ/L (ref 20–31)
COLOR UR: YELLOW
CREAT SERPL-MCNC: 0.89 MG/DL (ref 0.7–1.2)
D DIMER PPP FEU-MCNC: <0.27 MG/L FEU (ref 0–0.5)
EOSINOPHIL # BLD: 0 K/UL (ref 0–0.5)
EOSINOPHIL NFR BLD: 0.4 % (ref 0.8–7)
EPI CELLS #/AREA URNS HPF: ABNORMAL /HPF
ERYTHROCYTE [DISTWIDTH] IN BLOOD BY AUTOMATED COUNT: 13.2 % (ref 11.6–14.4)
GLUCOSE SERPL-MCNC: 106 MG/DL (ref 70–99)
GLUCOSE UR STRIP-MCNC: NEGATIVE MG/DL
HCT VFR BLD AUTO: 40 % (ref 42–52)
HGB BLD-MCNC: 14 G/DL (ref 13.7–17.5)
HGB UR QL STRIP: ABNORMAL
IMM GRANULOCYTES # BLD: 0 K/UL
IMM GRANULOCYTES NFR BLD: 0.3 %
INFLUENZA A BY PCR: NEGATIVE
INFLUENZA B BY PCR: NEGATIVE
INR PPP: 2.2
KETONES UR STRIP-MCNC: NEGATIVE MG/DL
LEUKOCYTE ESTERASE UR QL STRIP: NEGATIVE
LYMPHOCYTES # BLD: 1.5 K/UL (ref 1.3–3.6)
LYMPHOCYTES NFR BLD: 16 %
MCH RBC QN AUTO: 35.4 PG (ref 25.7–32.2)
MCHC RBC AUTO-ENTMCNC: 35 % (ref 32.3–36.5)
MCV RBC AUTO: 101.3 FL (ref 79–92.2)
MONOCYTES # BLD: 1.1 K/UL (ref 0.3–0.8)
MONOCYTES NFR BLD: 11.8 % (ref 5.3–12.2)
NEUTROPHILS # BLD: 6.8 K/UL (ref 1.8–5.4)
NEUTS SEG NFR BLD: 70.9 % (ref 34–67.9)
NITRITE UR QL STRIP: NEGATIVE
PH UR STRIP: 7.5 [PH] (ref 5–9)
PLATELET # BLD AUTO: 172 K/UL (ref 163–337)
POTASSIUM SERPL-SCNC: 3.8 MEQ/L (ref 3.4–4.9)
PROT UR STRIP-MCNC: 100 MG/DL
PROTHROMBIN TIME: 25.4 SEC (ref 12.3–14.9)
RBC # BLD AUTO: 3.95 M/UL (ref 4.63–6.08)
RBC #/AREA URNS HPF: ABNORMAL /HPF (ref 0–2)
SARS-COV-2 RDRP RESP QL NAA+PROBE: NOT DETECTED
SODIUM SERPL-SCNC: 143 MEQ/L (ref 135–144)
SP GR UR STRIP: 1.02 (ref 1–1.03)
TROPONIN, HIGH SENSITIVITY: 13 NG/L (ref 0–19)
TROPONIN, HIGH SENSITIVITY: 15 NG/L (ref 0–19)
URINE REFLEX TO CULTURE: ABNORMAL
UROBILINOGEN UR STRIP-ACNC: 0.2 E.U./DL
WBC # BLD AUTO: 9.6 K/UL (ref 4.2–9)
WBC #/AREA URNS HPF: ABNORMAL /HPF (ref 0–5)

## 2024-01-02 PROCEDURE — G0378 HOSPITAL OBSERVATION PER HR: HCPCS

## 2024-01-02 PROCEDURE — 85025 COMPLETE CBC W/AUTO DIFF WBC: CPT

## 2024-01-02 PROCEDURE — 2580000003 HC RX 258: Performed by: INTERNAL MEDICINE

## 2024-01-02 PROCEDURE — 87502 INFLUENZA DNA AMP PROBE: CPT

## 2024-01-02 PROCEDURE — 83880 ASSAY OF NATRIURETIC PEPTIDE: CPT

## 2024-01-02 PROCEDURE — 85379 FIBRIN DEGRADATION QUANT: CPT

## 2024-01-02 PROCEDURE — 71045 X-RAY EXAM CHEST 1 VIEW: CPT

## 2024-01-02 PROCEDURE — 2580000003 HC RX 258: Performed by: EMERGENCY MEDICINE

## 2024-01-02 PROCEDURE — 85610 PROTHROMBIN TIME: CPT

## 2024-01-02 PROCEDURE — 87635 SARS-COV-2 COVID-19 AMP PRB: CPT

## 2024-01-02 PROCEDURE — 2580000003 HC RX 258

## 2024-01-02 PROCEDURE — 93005 ELECTROCARDIOGRAM TRACING: CPT

## 2024-01-02 PROCEDURE — 80048 BASIC METABOLIC PNL TOTAL CA: CPT

## 2024-01-02 PROCEDURE — 2700000000 HC OXYGEN THERAPY PER DAY

## 2024-01-02 PROCEDURE — 99285 EMERGENCY DEPT VISIT HI MDM: CPT

## 2024-01-02 PROCEDURE — 87040 BLOOD CULTURE FOR BACTERIA: CPT

## 2024-01-02 PROCEDURE — 6370000000 HC RX 637 (ALT 250 FOR IP): Performed by: INTERNAL MEDICINE

## 2024-01-02 PROCEDURE — 36415 COLL VENOUS BLD VENIPUNCTURE: CPT

## 2024-01-02 PROCEDURE — 81001 URINALYSIS AUTO W/SCOPE: CPT

## 2024-01-02 PROCEDURE — 84484 ASSAY OF TROPONIN QUANT: CPT

## 2024-01-02 PROCEDURE — 85730 THROMBOPLASTIN TIME PARTIAL: CPT

## 2024-01-02 RX ORDER — POLYETHYLENE GLYCOL 3350 17 G/17G
17 POWDER, FOR SOLUTION ORAL DAILY PRN
Status: DISCONTINUED | OUTPATIENT
Start: 2024-01-02 | End: 2024-01-04 | Stop reason: HOSPADM

## 2024-01-02 RX ORDER — ONDANSETRON 2 MG/ML
4 INJECTION INTRAMUSCULAR; INTRAVENOUS EVERY 6 HOURS PRN
Status: DISCONTINUED | OUTPATIENT
Start: 2024-01-02 | End: 2024-01-04 | Stop reason: HOSPADM

## 2024-01-02 RX ORDER — DIGOXIN 125 MCG
125 TABLET ORAL DAILY
Status: DISCONTINUED | OUTPATIENT
Start: 2024-01-02 | End: 2024-01-04 | Stop reason: HOSPADM

## 2024-01-02 RX ORDER — 0.9 % SODIUM CHLORIDE 0.9 %
250 INTRAVENOUS SOLUTION INTRAVENOUS ONCE
Status: COMPLETED | OUTPATIENT
Start: 2024-01-02 | End: 2024-01-02

## 2024-01-02 RX ORDER — POTASSIUM CHLORIDE 750 MG/1
10 CAPSULE, EXTENDED RELEASE ORAL 2 TIMES DAILY
Status: ON HOLD | COMMUNITY
End: 2024-01-04 | Stop reason: HOSPADM

## 2024-01-02 RX ORDER — WARFARIN SODIUM 3 MG/1
7.5 TABLET ORAL
Status: COMPLETED | OUTPATIENT
Start: 2024-01-02 | End: 2024-01-02

## 2024-01-02 RX ORDER — TAMSULOSIN HYDROCHLORIDE 0.4 MG/1
0.4 CAPSULE ORAL DAILY
Status: DISCONTINUED | OUTPATIENT
Start: 2024-01-02 | End: 2024-01-04 | Stop reason: HOSPADM

## 2024-01-02 RX ORDER — ASPIRIN 81 MG/1
81 TABLET, CHEWABLE ORAL DAILY
Status: DISCONTINUED | OUTPATIENT
Start: 2024-01-02 | End: 2024-01-04 | Stop reason: HOSPADM

## 2024-01-02 RX ORDER — MAGNESIUM HYDROXIDE 1200 MG/15ML
LIQUID ORAL
Status: COMPLETED
Start: 2024-01-02 | End: 2024-01-02

## 2024-01-02 RX ORDER — ACETAMINOPHEN 325 MG/1
650 TABLET ORAL EVERY 6 HOURS PRN
Status: DISCONTINUED | OUTPATIENT
Start: 2024-01-02 | End: 2024-01-04 | Stop reason: HOSPADM

## 2024-01-02 RX ORDER — SODIUM CHLORIDE 9 MG/ML
INJECTION, SOLUTION INTRAVENOUS CONTINUOUS
Status: DISCONTINUED | OUTPATIENT
Start: 2024-01-02 | End: 2024-01-03

## 2024-01-02 RX ORDER — PRAVASTATIN SODIUM 40 MG
40 TABLET ORAL NIGHTLY
Status: DISCONTINUED | OUTPATIENT
Start: 2024-01-02 | End: 2024-01-04 | Stop reason: HOSPADM

## 2024-01-02 RX ORDER — SODIUM CHLORIDE 0.9 % (FLUSH) 0.9 %
5-40 SYRINGE (ML) INJECTION EVERY 12 HOURS SCHEDULED
Status: DISCONTINUED | OUTPATIENT
Start: 2024-01-02 | End: 2024-01-04 | Stop reason: HOSPADM

## 2024-01-02 RX ORDER — POTASSIUM CHLORIDE 7.45 MG/ML
10 INJECTION INTRAVENOUS PRN
Status: DISCONTINUED | OUTPATIENT
Start: 2024-01-02 | End: 2024-01-03

## 2024-01-02 RX ORDER — AMLODIPINE BESYLATE 5 MG/1
5 TABLET ORAL DAILY
Status: DISCONTINUED | OUTPATIENT
Start: 2024-01-02 | End: 2024-01-04 | Stop reason: HOSPADM

## 2024-01-02 RX ORDER — SODIUM CHLORIDE 0.9 % (FLUSH) 0.9 %
5-40 SYRINGE (ML) INJECTION PRN
Status: DISCONTINUED | OUTPATIENT
Start: 2024-01-02 | End: 2024-01-04 | Stop reason: HOSPADM

## 2024-01-02 RX ORDER — ACETAMINOPHEN 650 MG/1
650 SUPPOSITORY RECTAL EVERY 6 HOURS PRN
Status: DISCONTINUED | OUTPATIENT
Start: 2024-01-02 | End: 2024-01-04 | Stop reason: HOSPADM

## 2024-01-02 RX ORDER — POTASSIUM CHLORIDE 20 MEQ/1
40 TABLET, EXTENDED RELEASE ORAL PRN
Status: DISCONTINUED | OUTPATIENT
Start: 2024-01-02 | End: 2024-01-03

## 2024-01-02 RX ORDER — SODIUM CHLORIDE 9 MG/ML
INJECTION, SOLUTION INTRAVENOUS PRN
Status: DISCONTINUED | OUTPATIENT
Start: 2024-01-02 | End: 2024-01-04 | Stop reason: HOSPADM

## 2024-01-02 RX ORDER — MAGNESIUM SULFATE IN WATER 40 MG/ML
2000 INJECTION, SOLUTION INTRAVENOUS PRN
Status: DISCONTINUED | OUTPATIENT
Start: 2024-01-02 | End: 2024-01-03

## 2024-01-02 RX ORDER — LANOLIN ALCOHOL/MO/W.PET/CERES
3 CREAM (GRAM) TOPICAL NIGHTLY PRN
Status: DISCONTINUED | OUTPATIENT
Start: 2024-01-02 | End: 2024-01-04 | Stop reason: HOSPADM

## 2024-01-02 RX ORDER — ONDANSETRON 4 MG/1
4 TABLET, ORALLY DISINTEGRATING ORAL EVERY 8 HOURS PRN
Status: DISCONTINUED | OUTPATIENT
Start: 2024-01-02 | End: 2024-01-04 | Stop reason: HOSPADM

## 2024-01-02 RX ADMIN — WATER 500 ML: 100 IRRIGANT IRRIGATION at 20:36

## 2024-01-02 RX ADMIN — WARFARIN SODIUM 7.5 MG: 3 TABLET ORAL at 17:50

## 2024-01-02 RX ADMIN — DIGOXIN 125 MCG: 125 TABLET ORAL at 15:34

## 2024-01-02 RX ADMIN — ASPIRIN 81 MG: 81 TABLET, CHEWABLE ORAL at 15:34

## 2024-01-02 RX ADMIN — SODIUM CHLORIDE 250 ML: 9 INJECTION, SOLUTION INTRAVENOUS at 11:29

## 2024-01-02 RX ADMIN — AMLODIPINE BESYLATE 5 MG: 5 TABLET ORAL at 15:34

## 2024-01-02 RX ADMIN — PRAVASTATIN SODIUM 40 MG: 40 TABLET ORAL at 20:36

## 2024-01-02 RX ADMIN — SODIUM CHLORIDE: 9 INJECTION, SOLUTION INTRAVENOUS at 15:39

## 2024-01-02 RX ADMIN — TAMSULOSIN HYDROCHLORIDE 0.4 MG: 0.4 CAPSULE ORAL at 15:34

## 2024-01-02 RX ADMIN — Medication 3 MG: at 22:08

## 2024-01-02 ASSESSMENT — ENCOUNTER SYMPTOMS
ABDOMINAL PAIN: 0
EYE REDNESS: 0
SHORTNESS OF BREATH: 1
DIARRHEA: 0
BACK PAIN: 0
SINUS PAIN: 0
SORE THROAT: 0
COLOR CHANGE: 0
NAUSEA: 0
EYE DISCHARGE: 0
VOMITING: 0
COUGH: 0

## 2024-01-02 ASSESSMENT — PAIN - FUNCTIONAL ASSESSMENT: PAIN_FUNCTIONAL_ASSESSMENT: NONE - DENIES PAIN

## 2024-01-02 NOTE — PROGRESS NOTES
Warfarin Dosing - Pharmacy Consult Note  Consulting Provider: Dr Wilder  Indication:  Atrial Fibrillation  Warfarin Dose prior to admission: 7.5 mg on Tue, Thru and 5 mg all other days   Concurrent anticoagulants/antiplatelets: none  Significant Drug Interactions: No obvious interactions  Recent Labs     01/02/24  1055 01/02/24  1221   INR  --  2.2   HGB 14.0  --      --      Recent warfarin administrations        No warfarin orders with administrations found.            Orders not given:            warfarin placeholder: dosing by pharmacy                   Date   INR    Dose  01/02     2.2         7.5 mg    Assessment/Plan  (Goal INR: 2 - 3)  INR is therapeutic at 2.2 for range (2-3). Will give home dose of 7.5 mg x 1 today.    Active problem list reviewed.  INR orders are placed.  Chart reviewed for pertinent labs, drug/diet interactions, and past doses.  Documentation of patient's clinical condition was reviewed.    Pharmacy Dosing:  Pharmacy will continue to follow.      Taya Reed [facility]  1/2/2024  2:51 PM

## 2024-01-02 NOTE — ED NOTES
Esme Nursing supervisor aware that second trop is resulted and Dr. Wilder is ok with patient transporting to Med Surg. Electronically signed by Nella Mayes RN on 1/2/2024 at 1:52 PM

## 2024-01-02 NOTE — ED PROVIDER NOTES
[facility] ED  EMERGENCY DEPARTMENT ENCOUNTER      Pt Name: Alex Carlson  MRN: [id number]  Birthdate 1935  Date of evaluation: 1/2/2024  Provider: Denia Vergara DO  1:05 PM    CHIEF COMPLAINT       Chief Complaint   Patient presents with    Shortness of Breath     Woke up at 04:00 this morning short of breathe     Complaint: Shaky and short of breath  History of chief complaint: This 88-year-old gentleman presents the emergency department along with his wife complaining of an episode of feeling really shaky this morning patient states he went to get out of bed and had uncontrollable shaking kind of chills there was arms and legs could hardly walk.  Patient states he felt short of breath just hard to get a good breath in.  Wife states she heard him call out for when in the room and he was very pale looking and sweaty complaining he could not breathe.  Wife states that she checked his blood pressure at 155/100, checked a blood sugar of 116, states that her pulse ox was not working.  Wife states she had him lay back down and he did seem better the whole episode lasted approximately 1 hour.  Patient states he still feels like he cannot get a good breath.  Patient denies any recent illness no sore throat cough cold congestion no chest pain or palpitation patient states he does have a history of atrial fibrillation no history of coronary artery disease or congestive heart failure.  Patient denies any abdominal pain no nausea vomiting or diarrhea no dysuria hematuria frequency or urgency no associated back pain no leg pain or swelling patient does report general weak feeling no dizziness.    Nursing Notes were reviewed.    REVIEW OF SYSTEMS       Review of Systems   Constitutional:  Positive for chills, diaphoresis and fatigue.   HENT:  Negative for congestion, sinus pain and sore throat.    Eyes:  Negative for discharge and redness.   Respiratory:  Positive for shortness of breath. Negative for cough.

## 2024-01-02 NOTE — ED TRIAGE NOTES
Patient to ER with Sob and deep cough that started suddelnly this morning. Per wife patient has some new facial swelling along with being pale and shaking. Patient corcoran snot appear to be in acute distress at this time. 94% on RA. Axox4. Patient is able to talk in complete sentences. Electronically signed by Nella Mayes RN on 1/2/2024 at 10:36 AM

## 2024-01-02 NOTE — PROGRESS NOTES
Pt admitted to room 206 with chills. Pt a and o x4. Accompanied by wife. Pt head to toe complete. It is worth noting that when pt holds out arms he does have BL tremors. Per pt this is new for him. Also pt does get very SOB on exertion, however his pulse ox remains over 92%. Currently on RA. Pt does use a cpap at night. Pts wife to go home and retrieve CPAP. Pt denies pain. Pt up with SBA no device. On tele. Bed alarm on and call light in reach.

## 2024-01-02 NOTE — ED NOTES
Per lab trop will result in about 20 minutes. Electronically signed by Nella Mayes RN on 1/2/2024 at 1:22 PM

## 2024-01-02 NOTE — ED NOTES
Esme House Supervisor, calls and states, \"Per Dr. Wilder, the pt cannot come to the floor until the Troponin panel is completed.\" I voiced understanding and relayed the info to pt's nurse, Dr. Vergara and DAVON Warner.

## 2024-01-03 ENCOUNTER — APPOINTMENT (OUTPATIENT)
Age: 89
DRG: 291 | End: 2024-01-03
Attending: INTERNAL MEDICINE
Payer: MEDICARE

## 2024-01-03 PROBLEM — I50.43 CHF (CONGESTIVE HEART FAILURE), NYHA CLASS I, ACUTE ON CHRONIC, COMBINED (HCC): Status: ACTIVE | Noted: 2024-01-03

## 2024-01-03 LAB
ANION GAP SERPL CALCULATED.3IONS-SCNC: 11 MEQ/L (ref 9–15)
BASOPHILS # BLD: 0 K/UL (ref 0–0.1)
BASOPHILS NFR BLD: 0.4 % (ref 0.2–1.2)
BUN SERPL-MCNC: 17 MG/DL (ref 8–23)
CALCIUM SERPL-MCNC: 8.7 MG/DL (ref 8.5–9.9)
CHLORIDE SERPL-SCNC: 106 MEQ/L (ref 95–107)
CO2 SERPL-SCNC: 23 MEQ/L (ref 20–31)
CREAT SERPL-MCNC: 0.89 MG/DL (ref 0.7–1.2)
DIGOXIN SERPL-MCNC: 0.8 NG/ML (ref 0.8–2)
EKG ATRIAL RATE: 122 BPM
EKG Q-T INTERVAL: 364 MS
EKG QRS DURATION: 76 MS
EKG QTC CALCULATION (BAZETT): 401 MS
EKG R AXIS: -28 DEGREES
EKG T AXIS: 245 DEGREES
EKG VENTRICULAR RATE: 73 BPM
EOSINOPHIL # BLD: 0 K/UL (ref 0–0.5)
EOSINOPHIL NFR BLD: 0 % (ref 0.8–7)
ERYTHROCYTE [DISTWIDTH] IN BLOOD BY AUTOMATED COUNT: 13.3 % (ref 11.6–14.4)
GLUCOSE SERPL-MCNC: 138 MG/DL (ref 70–99)
HCT VFR BLD AUTO: 41.9 % (ref 42–52)
HGB BLD-MCNC: 14.3 G/DL (ref 13.7–17.5)
IMM GRANULOCYTES # BLD: 0.1 K/UL
IMM GRANULOCYTES NFR BLD: 0.8 %
INR PPP: 2.1
LYMPHOCYTES # BLD: 1 K/UL (ref 1.3–3.6)
LYMPHOCYTES NFR BLD: 7 %
MAGNESIUM SERPL-MCNC: 2.2 MG/DL (ref 1.7–2.4)
MCH RBC QN AUTO: 34.6 PG (ref 25.7–32.2)
MCHC RBC AUTO-ENTMCNC: 34.1 % (ref 32.3–36.5)
MCV RBC AUTO: 101.5 FL (ref 79–92.2)
MONOCYTES # BLD: 2.5 K/UL (ref 0.3–0.8)
MONOCYTES NFR BLD: 17 % (ref 5.3–12.2)
NEUTROPHILS # BLD: 11.1 K/UL (ref 1.8–5.4)
NEUTS SEG NFR BLD: 76 % (ref 34–67.9)
PLATELET # BLD AUTO: 175 K/UL (ref 163–337)
PLATELET BLD QL SMEAR: ADEQUATE
POTASSIUM SERPL-SCNC: 3.5 MEQ/L (ref 3.4–4.9)
PROTHROMBIN TIME: 23.8 SEC (ref 12.3–14.9)
RBC # BLD AUTO: 4.13 M/UL (ref 4.63–6.08)
SLIDE REVIEW: ABNORMAL
SODIUM SERPL-SCNC: 140 MEQ/L (ref 135–144)
WBC # BLD AUTO: 14.6 K/UL (ref 4.2–9)

## 2024-01-03 PROCEDURE — 36415 COLL VENOUS BLD VENIPUNCTURE: CPT

## 2024-01-03 PROCEDURE — 93306 TTE W/DOPPLER COMPLETE: CPT

## 2024-01-03 PROCEDURE — 97535 SELF CARE MNGMENT TRAINING: CPT

## 2024-01-03 PROCEDURE — 83735 ASSAY OF MAGNESIUM: CPT

## 2024-01-03 PROCEDURE — 2700000000 HC OXYGEN THERAPY PER DAY

## 2024-01-03 PROCEDURE — 85025 COMPLETE CBC W/AUTO DIFF WBC: CPT

## 2024-01-03 PROCEDURE — 1210000000 HC MED SURG R&B

## 2024-01-03 PROCEDURE — 6360000002 HC RX W HCPCS: Performed by: INTERNAL MEDICINE

## 2024-01-03 PROCEDURE — 6370000000 HC RX 637 (ALT 250 FOR IP): Performed by: INTERNAL MEDICINE

## 2024-01-03 PROCEDURE — 97116 GAIT TRAINING THERAPY: CPT

## 2024-01-03 PROCEDURE — 85610 PROTHROMBIN TIME: CPT

## 2024-01-03 PROCEDURE — 80162 ASSAY OF DIGOXIN TOTAL: CPT

## 2024-01-03 PROCEDURE — 2580000003 HC RX 258: Performed by: INTERNAL MEDICINE

## 2024-01-03 PROCEDURE — 97162 PT EVAL MOD COMPLEX 30 MIN: CPT

## 2024-01-03 PROCEDURE — 80048 BASIC METABOLIC PNL TOTAL CA: CPT

## 2024-01-03 PROCEDURE — 97166 OT EVAL MOD COMPLEX 45 MIN: CPT

## 2024-01-03 RX ORDER — WARFARIN SODIUM 5 MG/1
5 TABLET ORAL
Status: COMPLETED | OUTPATIENT
Start: 2024-01-03 | End: 2024-01-03

## 2024-01-03 RX ORDER — FUROSEMIDE 10 MG/ML
20 INJECTION INTRAMUSCULAR; INTRAVENOUS 2 TIMES DAILY
Status: DISCONTINUED | OUTPATIENT
Start: 2024-01-03 | End: 2024-01-04 | Stop reason: HOSPADM

## 2024-01-03 RX ORDER — POTASSIUM CHLORIDE 20 MEQ/1
40 TABLET, EXTENDED RELEASE ORAL ONCE
Status: COMPLETED | OUTPATIENT
Start: 2024-01-03 | End: 2024-01-03

## 2024-01-03 RX ORDER — FUROSEMIDE 10 MG/ML
60 INJECTION INTRAMUSCULAR; INTRAVENOUS ONCE
Status: COMPLETED | OUTPATIENT
Start: 2024-01-03 | End: 2024-01-03

## 2024-01-03 RX ADMIN — Medication 10 ML: at 09:06

## 2024-01-03 RX ADMIN — AMLODIPINE BESYLATE 5 MG: 5 TABLET ORAL at 09:06

## 2024-01-03 RX ADMIN — DIGOXIN 125 MCG: 125 TABLET ORAL at 09:06

## 2024-01-03 RX ADMIN — FUROSEMIDE 20 MG: 10 INJECTION, SOLUTION INTRAMUSCULAR; INTRAVENOUS at 17:31

## 2024-01-03 RX ADMIN — Medication 10 ML: at 20:16

## 2024-01-03 RX ADMIN — WARFARIN SODIUM 5 MG: 5 TABLET ORAL at 17:31

## 2024-01-03 RX ADMIN — FUROSEMIDE 60 MG: 10 INJECTION, SOLUTION INTRAMUSCULAR; INTRAVENOUS at 02:33

## 2024-01-03 RX ADMIN — ASPIRIN 81 MG: 81 TABLET, CHEWABLE ORAL at 09:07

## 2024-01-03 RX ADMIN — FUROSEMIDE 20 MG: 10 INJECTION, SOLUTION INTRAMUSCULAR; INTRAVENOUS at 09:05

## 2024-01-03 RX ADMIN — TAMSULOSIN HYDROCHLORIDE 0.4 MG: 0.4 CAPSULE ORAL at 09:06

## 2024-01-03 RX ADMIN — POTASSIUM CHLORIDE 40 MEQ: 1500 TABLET, EXTENDED RELEASE ORAL at 09:06

## 2024-01-03 RX ADMIN — PRAVASTATIN SODIUM 40 MG: 40 TABLET ORAL at 20:16

## 2024-01-03 NOTE — PROGRESS NOTES
Pt SOB and SpO2 dropped to 85% when ambulating to and from bathroom. O2 applied @ 2 lm via nc. SpO2 increased to 94%. Pt refusing to wear CPAP tonight and states he prefers to wear the O2. Pt resting in bed at this time. Respirations even and unlabored.

## 2024-01-03 NOTE — PROGRESS NOTES
Facility/Department: [facility] MED SURG UNIT  Occupational Therapy Initial Assessment    Name: Alex Carlson  : 1935  MRN: [id number]  Date of Service: 1/3/2024    Discharge Recommendations:  Continue to assess pending progress, Home with assist PRN, Home with Home health OT ([facility] no home OT but will continue to assess)          Patient Diagnosis(es): The primary encounter diagnosis was Dyspnea, unspecified type. Diagnoses of General weakness and Chills were also pertinent to this visit.  Past Medical History:  has a past medical history of Atrial fibrillation (HCC), HTN (hypertension), OSVALDO on CPAP, Sensory hearing loss, bilateral, and Tinnitus.  Past Surgical History:  has a past surgical history that includes Cataract removal ().    Treatment Diagnosis: Decreased ADL/IADL performance d/t SOB, chills, edema.      Assessment   Performance deficits / Impairments: Decreased functional mobility ;Decreased endurance;Decreased ADL status;Decreased high-level IADLs;Decreased balance  Assessment: Pt is a 89 y/o M admitted to [facility] for chills, tremors, and SOB. PLOF pt is from home with spouse, reports Mod I level for ADLs/IADLs. Pt does not use a device to walk and has not had any falls. OT eval completed with pt appearing to be close to baseline level of function, he is on 3L via NC- was not on home oxygen. No tremors noted during session and pt reports he feels more steady today than yesterday. Anticipate pt will require no additional OT at d/c, pt will be on caseload while here to ensure engagement in ADLs and to address O2 line safety.  Treatment Diagnosis: Decreased ADL/IADL performance d/t SOB, chills, edema.  REQUIRES OT FOLLOW-UP: Yes  Activity Tolerance  Activity Tolerance: Patient Tolerated treatment well        Plan   Occupational Therapy Plan  Times Per Week: 4-7  Times Per Day: Once a day  Current Treatment Recommendations: Strengthening, Balance training, Functional mobility training, Endurance

## 2024-01-03 NOTE — PROGRESS NOTES
Pt c/o SOB and dyspnea at rest and with exertion. O2 on @ 2 lm via nc. Rhonchi noted to LLL. Pt with 1= pitting edema to BLE. Pt has been converting back and forth from NSR to A-fib. Perfect serve sent to Dr. Vizcarra.

## 2024-01-03 NOTE — PROGRESS NOTES
Chart reviewed.  Patient presented to ED with reports of shortness of breath.  Patient was admitted under observation and changed to inpatient status on this date. Patient's care discussed in daily quality rounds.  Patient is reported to be from home with spouse.  PT/OT evaluated patient and recommending home with prn assist upon DC.  Patient is reported to be independent for ADLS and is an active .  Patient is reported not to have any assisted devises or home O2.  Patient is currently receiving O2 at [facility] and goal is to be able to wean off of O2 before DC.  Patient is reported to have home C-pap machine, and follows up with pulmonary services outpatient.  Respiratory is following patient at [facility].  Patient is identified to have no SS or DC needs at this time.  SS to follow as needed while patient is at [facility]

## 2024-01-03 NOTE — PROGRESS NOTES
Warfarin Dosing - Pharmacy Consult Note  Consulting Provider: Dr Wilder  Indication:  Atrial Fibrillation  Warfarin Dose prior to admission: 7.5 mg on Tue, Thru and 5 mg all other days   Concurrent anticoagulants/antiplatelets: none  Significant Drug Interactions: No obvious interactions  Recent Labs     01/02/24  1055 01/02/24  1221 01/03/24  0604   INR  --  2.2 2.1   HGB 14.0  --  14.3     --  175     Recent warfarin administrations                     warfarin (COUMADIN) tablet 7.5 mg (mg) 7.5 mg Given 01/02/24 1750                   Date   INR    Dose  01/02     2.2         7.5 mg  01/03     2.1           5 mg    Assessment/Plan  (Goal INR: 2 - 3)  INR is therapeutic at 2.1 for range (2-3). Will give home dose of 5 mg x 1 today.    Active problem list reviewed.  INR orders are placed.  Chart reviewed for pertinent labs, drug/diet interactions, and past doses.  Documentation of patient's clinical condition was reviewed.    Pharmacy Dosing:  Pharmacy will continue to follow.

## 2024-01-03 NOTE — H&P
texture, turgor normal.  No rashes or lesions.  Neurologic:  Neurovascularly intact without any focal sensory/motor deficits. Cranial nerves: II-XII intact, grossly non-focal.  Psychiatric:  Alert and oriented, thought content appropriate, normal insight  Capillary Refill: Brisk,< 3 seconds   Peripheral Pulses: +2 palpable, equal bilaterally       Labs:     Recent Labs     01/02/24  1055 01/03/24  0604   WBC 9.6* 14.6*   HGB 14.0 14.3   HCT 40.0* 41.9*    175     Recent Labs     01/02/24  1055 01/03/24  0604    140   K 3.8 3.5    106   CO2 24 23   BUN 19 17   CREATININE 0.89 0.89   CALCIUM 9.1 8.7     No results for input(s): \"AST\", \"ALT\", \"BILIDIR\", \"BILITOT\", \"ALKPHOS\" in the last 72 hours.  Recent Labs     01/02/24  1221 01/03/24  0604   INR 2.2 2.1     No results for input(s): \"CKTOTAL\", \"TROPONINI\" in the last 72 hours.    Urinalysis:      Lab Results   Component Value Date/Time    NITRU Negative 01/02/2024 12:41 PM    WBCUA 0-2 01/02/2024 12:41 PM    BACTERIA FEW 01/02/2024 12:41 PM    RBCUA 0-2 01/02/2024 12:41 PM    BLOODU Trace-intact 01/02/2024 12:41 PM    SPECGRAV 1.025 01/02/2024 12:41 PM    GLUCOSEU Negative 01/02/2024 12:41 PM       Radiology:     CXR: I have reviewed the CXR with the following interpretation:   EKG:  I have reviewed the EKG with the following interpretation:     XR CHEST PORTABLE   Final Result   Cardiomegaly with mild pulmonary vascular congestion.             ASSESSMENT:    Active Hospital Problems    Diagnosis Date Noted    CHF (congestive heart failure), NYHA class I, acute on chronic, combined (HCC) [I50.43] 01/03/2024    Weakness [R53.1] 01/02/2024       PLAN:        DVT Prophylaxis: coumadin  Diet: ADULT DIET; Regular  Code Status: Full Code    PT/OT Eval Status: pending     Dispo - Dyspnea/SOB/edema/changes on chest X ray, elevated BNP due to mild CHF exacerbation- IV lasix initiated, has negative water balance, checking 2 d echo, follow up clinically- edema

## 2024-01-03 NOTE — PROGRESS NOTES
Facility/Department: [facility] SURG UNIT  Physical Therapy Initial Assessment    Name: Alex Carlson  : 1935  MRN: [id number]  Date of Service: 1/3/2024    Discharge Recommendations:             Patient Diagnosis(es): The primary encounter diagnosis was Dyspnea, unspecified type. Diagnoses of General weakness and Chills were also pertinent to this visit.  Past Medical History:  has a past medical history of Atrial fibrillation (HCC), HTN (hypertension), OSVALDO on CPAP, Sensory hearing loss, bilateral, and Tinnitus.  Past Surgical History:  has a past surgical history that includes Cataract removal ().    Assessment   Body Structures, Functions, Activity Limitations Requiring Skilled Therapeutic Intervention: Decreased functional mobility ;Decreased endurance  Assessment: Pt with nooted mild short of breath on 2L of O2 via NC, no home O2 previusly. Pt will benenfit from skilled IP PT along with medical course. Recommend homee with PRNA at discharge. IF endurance still down may request OP PT or cardio rehab form primary MD. Pt understands plan and goals and agreeaable to PT intervention. Educated must calll for assist.  Treatment Diagnosis: decreased endurance and difficulty walking with CHF  Therapy Prognosis: Good  Requires PT Follow-Up: Yes  Activity Tolerance  Activity Tolerance: Patient limited by fatigue     Plan   Physical Therapy Plan  General Plan: 5-7 times per week (1-2x per day)  Current Treatment Recommendations: Strengthening, Functional mobility training, Endurance training, Gait training, Transfer training, Stair training, Equipment evaluation, education, & procurement, Modalities, Positioning, Safety education & training, Patient/Caregiver education & training, Therapeutic activities  Safety Devices  Type of Devices: Patient at risk for falls, Left in chair, Call light within reach, Nurse notified     Restrictions  Restrictions/Precautions  Restrictions/Precautions: Fall Risk (no home O2

## 2024-01-04 VITALS
HEART RATE: 83 BPM | RESPIRATION RATE: 18 BRPM | WEIGHT: 200 LBS | SYSTOLIC BLOOD PRESSURE: 134 MMHG | OXYGEN SATURATION: 96 % | BODY MASS INDEX: 29.62 KG/M2 | HEIGHT: 69 IN | TEMPERATURE: 99 F | DIASTOLIC BLOOD PRESSURE: 65 MMHG

## 2024-01-04 LAB
ANION GAP SERPL CALCULATED.3IONS-SCNC: 9 MEQ/L (ref 9–15)
BASOPHILS # BLD: 0 K/UL (ref 0–0.1)
BASOPHILS NFR BLD: 0.4 % (ref 0.2–1.2)
BNP BLD-MCNC: 1884 PG/ML
BUN SERPL-MCNC: 19 MG/DL (ref 8–23)
CALCIUM SERPL-MCNC: 8.6 MG/DL (ref 8.5–9.9)
CHLORIDE SERPL-SCNC: 104 MEQ/L (ref 95–107)
CO2 SERPL-SCNC: 27 MEQ/L (ref 20–31)
CREAT SERPL-MCNC: 0.9 MG/DL (ref 0.7–1.2)
ECHO AO ROOT DIAM: 3.5 CM
ECHO AO ROOT INDEX: 1.69 CM/M2
ECHO AV AREA PEAK VELOCITY: 2.3 CM2
ECHO AV AREA VTI: 2.1 CM2
ECHO AV AREA/BSA PEAK VELOCITY: 1.1 CM2/M2
ECHO AV AREA/BSA VTI: 1 CM2/M2
ECHO AV MEAN GRADIENT: 5 MMHG
ECHO AV MEAN VELOCITY: 1 M/S
ECHO AV PEAK GRADIENT: 8 MMHG
ECHO AV PEAK VELOCITY: 1.5 M/S
ECHO AV VELOCITY RATIO: 0.67
ECHO AV VTI: 28.8 CM
ECHO BSA: 2.1 M2
ECHO EST RA PRESSURE: 3 MMHG
ECHO LA DIAMETER INDEX: 2.32 CM/M2
ECHO LA DIAMETER: 4.8 CM
ECHO LA TO AORTIC ROOT RATIO: 1.37
ECHO LA VOL A-L A2C: 72 ML (ref 18–58)
ECHO LA VOL A-L A4C: 101 ML (ref 18–58)
ECHO LA VOL MOD A2C: 67 ML (ref 18–58)
ECHO LA VOL MOD A4C: 92 ML (ref 18–58)
ECHO LA VOLUME AREA LENGTH: 90 ML
ECHO LA VOLUME INDEX A-L A2C: 35 ML/M2 (ref 16–34)
ECHO LA VOLUME INDEX A-L A4C: 49 ML/M2 (ref 16–34)
ECHO LA VOLUME INDEX AREA LENGTH: 43 ML/M2 (ref 16–34)
ECHO LA VOLUME INDEX MOD A2C: 32 ML/M2 (ref 16–34)
ECHO LA VOLUME INDEX MOD A4C: 44 ML/M2 (ref 16–34)
ECHO LV E' LATERAL VELOCITY: 14 CM/S
ECHO LV E' SEPTAL VELOCITY: 12 CM/S
ECHO LV EDV A2C: 65 ML
ECHO LV EDV A4C: 67 ML
ECHO LV EDV BP: 69 ML (ref 67–155)
ECHO LV EDV INDEX A4C: 32 ML/M2
ECHO LV EDV INDEX BP: 33 ML/M2
ECHO LV EDV NDEX A2C: 31 ML/M2
ECHO LV EJECTION FRACTION A2C: 71 %
ECHO LV EJECTION FRACTION A4C: 66 %
ECHO LV EJECTION FRACTION BIPLANE: 70 % (ref 55–100)
ECHO LV ESV A2C: 19 ML
ECHO LV ESV A4C: 23 ML
ECHO LV ESV BP: 21 ML (ref 22–58)
ECHO LV ESV INDEX A2C: 9 ML/M2
ECHO LV ESV INDEX A4C: 11 ML/M2
ECHO LV ESV INDEX BP: 10 ML/M2
ECHO LV FRACTIONAL SHORTENING: 35 % (ref 28–44)
ECHO LV INTERNAL DIMENSION DIASTOLE INDEX: 2.22 CM/M2
ECHO LV INTERNAL DIMENSION DIASTOLIC: 4.6 CM (ref 4.2–5.9)
ECHO LV INTERNAL DIMENSION SYSTOLIC INDEX: 1.45 CM/M2
ECHO LV INTERNAL DIMENSION SYSTOLIC: 3 CM
ECHO LV IVSD: 1.3 CM (ref 0.6–1)
ECHO LV IVSS: 1.2 CM
ECHO LV MASS 2D: 205 G (ref 88–224)
ECHO LV MASS INDEX 2D: 99 G/M2 (ref 49–115)
ECHO LV POSTERIOR WALL DIASTOLIC: 1.1 CM (ref 0.6–1)
ECHO LV POSTERIOR WALL SYSTOLIC: 1.3 CM
ECHO LV RELATIVE WALL THICKNESS RATIO: 0.48
ECHO LVOT AREA: 3.1 CM2
ECHO LVOT AV VTI INDEX: 0.63
ECHO LVOT DIAM: 2 CM
ECHO LVOT MEAN GRADIENT: 2 MMHG
ECHO LVOT PEAK GRADIENT: 4 MMHG
ECHO LVOT PEAK VELOCITY: 1 M/S
ECHO LVOT STROKE VOLUME INDEX: 27.6 ML/M2
ECHO LVOT SV: 57.1 ML
ECHO LVOT VTI: 18.2 CM
ECHO PV ACCELERATION TIME (AT): 95.2 MS
ECHO PV MAX VELOCITY: 1.4 M/S
ECHO PV PEAK GRADIENT: 8 MMHG
ECHO RIGHT VENTRICULAR SYSTOLIC PRESSURE (RVSP): 43 MMHG
ECHO RV TAPSE: 1.8 CM (ref 1.7–?)
ECHO TV REGURGITANT MAX VELOCITY: 3.17 M/S
ECHO TV REGURGITANT PEAK GRADIENT: 40 MMHG
EOSINOPHIL # BLD: 0.1 K/UL (ref 0–0.5)
EOSINOPHIL NFR BLD: 1 % (ref 0.8–7)
ERYTHROCYTE [DISTWIDTH] IN BLOOD BY AUTOMATED COUNT: 13 % (ref 11.6–14.4)
GLUCOSE SERPL-MCNC: 97 MG/DL (ref 70–99)
HCT VFR BLD AUTO: 39.2 % (ref 42–52)
HGB BLD-MCNC: 13.9 G/DL (ref 13.7–17.5)
IMM GRANULOCYTES # BLD: 0.1 K/UL
IMM GRANULOCYTES NFR BLD: 0.4 %
INR PPP: 2.7
LYMPHOCYTES # BLD: 0.6 K/UL (ref 1.3–3.6)
LYMPHOCYTES NFR BLD: 5 %
MCH RBC QN AUTO: 35.4 PG (ref 25.7–32.2)
MCHC RBC AUTO-ENTMCNC: 35.5 % (ref 32.3–36.5)
MCV RBC AUTO: 99.7 FL (ref 79–92.2)
MONOCYTES # BLD: 2.1 K/UL (ref 0.3–0.8)
MONOCYTES NFR BLD: 18 % (ref 5.3–12.2)
NEUTROPHILS # BLD: 8.7 K/UL (ref 1.8–5.4)
NEUTS BAND NFR BLD MANUAL: 4 %
NEUTS SEG NFR BLD: 72 % (ref 34–67.9)
PLATELET # BLD AUTO: 155 K/UL (ref 163–337)
PLATELET BLD QL SMEAR: ABNORMAL
POTASSIUM SERPL-SCNC: 3.3 MEQ/L (ref 3.4–4.9)
PROTHROMBIN TIME: 29 SEC (ref 12.3–14.9)
RBC # BLD AUTO: 3.93 M/UL (ref 4.63–6.08)
SLIDE REVIEW: ABNORMAL
SODIUM SERPL-SCNC: 140 MEQ/L (ref 135–144)
WBC # BLD AUTO: 11.5 K/UL (ref 4.2–9)

## 2024-01-04 PROCEDURE — 6360000002 HC RX W HCPCS: Performed by: INTERNAL MEDICINE

## 2024-01-04 PROCEDURE — 6370000000 HC RX 637 (ALT 250 FOR IP): Performed by: INTERNAL MEDICINE

## 2024-01-04 PROCEDURE — 83880 ASSAY OF NATRIURETIC PEPTIDE: CPT

## 2024-01-04 PROCEDURE — 36415 COLL VENOUS BLD VENIPUNCTURE: CPT

## 2024-01-04 PROCEDURE — 85610 PROTHROMBIN TIME: CPT

## 2024-01-04 PROCEDURE — 80048 BASIC METABOLIC PNL TOTAL CA: CPT

## 2024-01-04 PROCEDURE — 2700000000 HC OXYGEN THERAPY PER DAY

## 2024-01-04 PROCEDURE — 85025 COMPLETE CBC W/AUTO DIFF WBC: CPT

## 2024-01-04 PROCEDURE — 2580000003 HC RX 258: Performed by: INTERNAL MEDICINE

## 2024-01-04 RX ORDER — FUROSEMIDE 20 MG/1
20 TABLET ORAL DAILY
Qty: 30 TABLET | Refills: 0 | Status: SHIPPED | OUTPATIENT
Start: 2024-01-04

## 2024-01-04 RX ORDER — POTASSIUM CHLORIDE 20 MEQ/1
40 TABLET, EXTENDED RELEASE ORAL ONCE
Status: COMPLETED | OUTPATIENT
Start: 2024-01-04 | End: 2024-01-04

## 2024-01-04 RX ORDER — POTASSIUM CHLORIDE 20 MEQ/1
40 TABLET, EXTENDED RELEASE ORAL DAILY
Qty: 30 TABLET | Refills: 0 | Status: SHIPPED | OUTPATIENT
Start: 2024-01-04

## 2024-01-04 RX ADMIN — DIGOXIN 125 MCG: 125 TABLET ORAL at 08:10

## 2024-01-04 RX ADMIN — Medication 10 ML: at 08:11

## 2024-01-04 RX ADMIN — ASPIRIN 81 MG: 81 TABLET, CHEWABLE ORAL at 08:10

## 2024-01-04 RX ADMIN — TAMSULOSIN HYDROCHLORIDE 0.4 MG: 0.4 CAPSULE ORAL at 08:11

## 2024-01-04 RX ADMIN — FUROSEMIDE 20 MG: 10 INJECTION, SOLUTION INTRAMUSCULAR; INTRAVENOUS at 08:11

## 2024-01-04 RX ADMIN — POTASSIUM CHLORIDE 40 MEQ: 1500 TABLET, EXTENDED RELEASE ORAL at 09:00

## 2024-01-04 RX ADMIN — AMLODIPINE BESYLATE 5 MG: 5 TABLET ORAL at 08:10

## 2024-01-04 NOTE — PLAN OF CARE
Problem: Discharge Planning  Goal: Discharge to home or other facility with appropriate resources  1/4/2024 0921 by Lance Saavedra RN  Outcome: Completed  1/3/2024 2332 by Clarence Barton RN  Outcome: Progressing     Problem: Safety - Adult  Goal: Free from fall injury  1/4/2024 0921 by Lance Saavedra RN  Outcome: Completed  1/3/2024 2332 by Clarence Barton RN  Outcome: Progressing     Problem: ABCDS Injury Assessment  Goal: Absence of physical injury  1/4/2024 0921 by Lance Saavedra RN  Outcome: Completed  1/3/2024 2332 by Clarence Barton RN  Outcome: Progressing

## 2024-01-04 NOTE — PROGRESS NOTES
Pt alert and oriented. Discharge instructions complete. All questions and concerns answered. IV removed. Wife at bedside to transport pt home. Pt and wife requested to make their own follow up appts.

## 2024-01-04 NOTE — DISCHARGE SUMMARY
Discharge Summary    Patient:  Alex Carlson  YOB: 1935    MRN: [id number]   Acct: [id number]    Primary Care Physician: William Simon DO    Admit date:  1/2/2024    Discharge date:   01/04/24      Discharge Diagnoses:   Weakness  Principal Problem:    Weakness  Active Problems:    CHF (congestive heart failure), NYHA class I, acute on chronic, combined (HCC)  Resolved Problems:    * No resolved hospital problems. *      Admitted for: (HPI)above    Hospital Course: patient was admitted with weakness/dyspnea/SOB, respiratory failure, treated for CHF exacerbation and had negative water balance. 2 d echo performed and report pending at [facility]. Patient will refer to follow up with dr Ferreira as outpatient for 2 d echo report. Will prescribe PO lasix/K supplement     Consultants:  PT    Discharge Medications:       Medication List        START taking these medications      furosemide 20 MG tablet  Commonly known as: Lasix  Take 1 tablet by mouth daily     potassium chloride 20 MEQ extended release tablet  Commonly known as: KLOR-CON M  Take 2 tablets by mouth daily            CONTINUE taking these medications      amLODIPine 5 MG tablet  Commonly known as: NORVASC     aspirin 81 MG chewable tablet     * CPAP Machine Misc     * CPAP Machine Misc  by Does not apply route New CPAP with 7 cm     Send to medical service with PSG report     digoxin 125 MCG tablet  Commonly known as: LANOXIN     pravastatin 40 MG tablet  Commonly known as: PRAVACHOL     Respiratory Therapy Supplies Barbie  New CPAP mask and supplies     tamsulosin 0.4 MG capsule  Commonly known as: FLOMAX     warfarin 5 MG tablet  Commonly known as: Coumadin  Take as directed. If you are unsure how to take this medication, talk to your nurse or doctor.  Original instructions: Indications: Atrial Fibrillation Take as directed by [facility]. Quantity for 90 day supply.           * This list has 2

## 2024-01-04 NOTE — DISCHARGE INSTR - COC
Continuity of Care Form    Patient Name: Alex Carlson   :  1935  MRN:  [id number]    Admit date:  2024  Discharge date:  24    Code Status Order: Full Code   Advance Directives:     Admitting Physician:  Brandan Wilder MD  PCP: William Simon DO    Discharging Nurse: Lance DONATO  Discharging Hospital Unit/Room#: 0206/0206-01  Discharging Unit Phone Number: [phone]    Emergency Contact:   Extended Emergency Contact Information  Primary Emergency Contact: Carrie Carlson   [facility]  Home Phone: [phone]  Mobile Phone: [phone]  Relation: None    Past Surgical History:  Past Surgical History:   Procedure Laterality Date    CATARACT REMOVAL         Immunization History:   Immunization History   Administered Date(s) Administered    COVID-19, PFIZER Bivalent, DO NOT Dilute, (age 12y+), IM, 30 mcg/0.3 mL 2022    COVID-19, PFIZER GRAY top, DO NOT Dilute, (age 12 y+), IM, 30 mcg/0.3 mL 2022    COVID-19, PFIZER PURPLE top, DILUTE for use, (age 12 y+), 30mcg/0.3mL 2021, 2021, 10/02/2021    COVID-19, PFIZER, (- formula), (age 12y+), IM, 30mcg/0.3mL 10/16/2023    Influenza, FLUZONE (age 65 y+), High Dose, 0.7mL 2020    Pneumococcal, PCV-13, PREVNAR 13, (age 6w+), IM, 0.5mL 2021    TDaP, ADACEL (age 10y-64y), BOOSTRIX (age 10y+), IM, 0.5mL 2023       Active Problems:  Patient Active Problem List   Diagnosis Code    Atrial fibrillation (HCC) I48.91    OSVALDO on CPAP G47.33    HTN (hypertension) I10    History of retinal vein occlusion Z86.79    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

## 2024-01-04 NOTE — PLAN OF CARE
Pt is in bed watching Tv. He is A/O/ x 4, forgetful, Pt is 1 Assist/ unsteady, weak. He takes his po medications whole w/ water. Last BM on 01/03/2024, no c/o constipation, abd pain, or diarrhea. Refuses to wear his CPaP/BiPaP at HS. He wears O2 via NC at 2 L, POX at 94%. He can be SOB on exertion. No c/o pain at this time. Pt stated, \"I feel much better compared to the other day.\" He is in bed resting comfortably. Call light is w/in reach. Will continue to monitor.

## 2024-01-04 NOTE — PROGRESS NOTES
Warfarin Dosing - Pharmacy Consult Note  Consulting Provider: Dr. Wilder  Indication:  Atrial Fibrillation  Warfarin Dose prior to admission: 7.5 mg on Tue, Thru and 5 mg all other days    Concurrent anticoagulants/antiplatelets: none  Significant Drug Interactions: No obvious interactions  Recent Labs     01/02/24  1055 01/02/24  1221 01/03/24  0604 01/04/24  0602   INR  --  2.2 2.1 2.7   HGB 14.0  --  14.3 13.9     --  175 155*     Recent warfarin administrations                     warfarin (COUMADIN) tablet 5 mg (mg) 5 mg Given 01/03/24 1731    warfarin (COUMADIN) tablet 7.5 mg (mg) 7.5 mg Given 01/02/24 1750                   Date   INR    Dose  01/02     2.2         7.5 mg  01/03     2.1           5 mg  01/04     2.7           2.5 mg    Assessment/Plan  (Goal INR: 2 - 3)  INR of 2.7 is therapeutic. Do to fact INR increased 0.6 in past 24 hours will give half of normal home dose. Will give 2.5 mg warfarin today.    Active problem list reviewed.  INR orders are placed.  Chart reviewed for pertinent labs, drug/diet interactions, and past doses.  Documentation of patient's clinical condition was reviewed.    Pharmacy Dosing:  Pharmacy will continue to follow.     ROSA Mccord.Ph.  1/4/2024  10:34 AM

## 2024-01-04 NOTE — PROGRESS NOTES
Cardiopulmonary to bedside to perform a home oxygen evaluation. PT currently on a 2LNC at rest with a SPO2 of 98%.  PT was taken off of oxygen and rechecked 15 minutes later.  PT had a SPO2 of 95% at rest on room air.  PT was then ambulated and rechecked.  PT had an SPO2 of 93% on room air post ambulation.  PT does not meet criteria for home oxygen at this time.

## 2024-01-04 NOTE — PROGRESS NOTES
Pt up in chair eating breakfast. Denies any pain or discomfort at this time. Last BM 1/4. Call light within reach. Will continue to monitor

## 2024-01-05 ENCOUNTER — CARE COORDINATION (OUTPATIENT)
Dept: CARE COORDINATION | Age: 89
End: 2024-01-05

## 2024-01-05 NOTE — CARE COORDINATION
Care Transitions Initial Follow Up Call    Call within 2 business days of discharge: Yes    Patient Current Location:  Home: [address] [place] [id number]    Care Transition Nurse contacted the spouse/partner by telephone to perform post hospital discharge assessment.  Provided introduction to self, and explanation of the Care Transition Nurse role.     Patient: Alex Carlson Patient : 1935   MRN: [id number]  Reason for Admission: Dyspnea, unspecified type  Discharge Date: 24 RARS: Readmission Risk Score: 10.9      Last Discharge Facility       Date Complaint Diagnosis Description Type Department Provider    24 Shortness of Breath Dyspnea, unspecified type ... ED to Hosp-Admission (Discharged) (ADMITTED) Brandan Dsouza MD; Anthony Vergara...            Was this an external facility discharge? No Discharge Facility: [facility]    Challenges to be reviewed by the provider   Additional needs identified to be addressed with provider: No  none               Method of communication with provider: none.      -Spoke with patient's wife Carrie (on communication release of information form)  for initial care transition call post hospital discharge.   -Patient admitted to [facility] on 24 with symptoms of SOB and weakness.    -Patient's wife reports her  slept well last and has a good appetite. Patient's wife monitors her 's oximeter, B/P, and weight.  Remarks his leg were very swollen upon admission but now legs are almost back to normal.  Patient wears compression hose.  B/P stable, did not provide any readings.  Most recent oximeter 94% in RA, weight 184 pounds.    -CTN noted order for [facility] in EMR and inquired to Carrie.  Patient's wife states they declined the need for home care upon discharge.  -States will drive her  to any appointments.  -Patient's wife  denies any needs, questions, or concerns at this time and politely declined follow up care transition

## 2024-01-07 LAB
BACTERIA BLD CULT ORG #2: NORMAL
BACTERIA BLD CULT: NORMAL

## 2024-01-09 ENCOUNTER — OFFICE VISIT (OUTPATIENT)
Dept: CARDIOLOGY | Facility: CLINIC | Age: 89
End: 2024-01-09
Payer: MEDICARE

## 2024-01-09 VITALS
DIASTOLIC BLOOD PRESSURE: 62 MMHG | BODY MASS INDEX: 28.58 KG/M2 | HEIGHT: 69 IN | SYSTOLIC BLOOD PRESSURE: 136 MMHG | WEIGHT: 193 LBS

## 2024-01-09 DIAGNOSIS — I50.31 ACUTE DIASTOLIC HEART FAILURE (MULTI): ICD-10-CM

## 2024-01-09 DIAGNOSIS — I25.10 CORONARY ARTERY DISEASE INVOLVING NATIVE CORONARY ARTERY OF NATIVE HEART WITHOUT ANGINA PECTORIS: Primary | ICD-10-CM

## 2024-01-09 DIAGNOSIS — G47.33 OSA ON CPAP: ICD-10-CM

## 2024-01-09 DIAGNOSIS — R06.83 SNORING: ICD-10-CM

## 2024-01-09 DIAGNOSIS — I47.10 PSVT (PAROXYSMAL SUPRAVENTRICULAR TACHYCARDIA) (CMS-HCC): ICD-10-CM

## 2024-01-09 DIAGNOSIS — Z79.01 CURRENT USE OF LONG TERM ANTICOAGULATION: ICD-10-CM

## 2024-01-09 DIAGNOSIS — I36.1 NONRHEUMATIC TRICUSPID VALVE REGURGITATION: ICD-10-CM

## 2024-01-09 DIAGNOSIS — I27.20 PULMONARY HYPERTENSION (MULTI): ICD-10-CM

## 2024-01-09 DIAGNOSIS — E78.2 MIXED HYPERLIPIDEMIA: ICD-10-CM

## 2024-01-09 DIAGNOSIS — Z79.899 HIGH RISK MEDICATION USE: ICD-10-CM

## 2024-01-09 DIAGNOSIS — G47.33 OBSTRUCTIVE SLEEP APNEA, ADULT: ICD-10-CM

## 2024-01-09 DIAGNOSIS — R73.9 HYPERGLYCEMIA: ICD-10-CM

## 2024-01-09 DIAGNOSIS — R60.0 LOCALIZED EDEMA: ICD-10-CM

## 2024-01-09 DIAGNOSIS — R06.09 DOE (DYSPNEA ON EXERTION): ICD-10-CM

## 2024-01-09 DIAGNOSIS — I48.0 PAROXYSMAL ATRIAL FIBRILLATION (MULTI): ICD-10-CM

## 2024-01-09 DIAGNOSIS — I10 PRIMARY HYPERTENSION: ICD-10-CM

## 2024-01-09 PROCEDURE — 99496 TRANSJ CARE MGMT HIGH F2F 7D: CPT | Performed by: INTERNAL MEDICINE

## 2024-01-09 PROCEDURE — 3078F DIAST BP <80 MM HG: CPT | Performed by: INTERNAL MEDICINE

## 2024-01-09 PROCEDURE — 1036F TOBACCO NON-USER: CPT | Performed by: INTERNAL MEDICINE

## 2024-01-09 PROCEDURE — 3075F SYST BP GE 130 - 139MM HG: CPT | Performed by: INTERNAL MEDICINE

## 2024-01-09 PROCEDURE — 1159F MED LIST DOCD IN RCRD: CPT | Performed by: INTERNAL MEDICINE

## 2024-01-09 PROCEDURE — 93000 ELECTROCARDIOGRAM COMPLETE: CPT | Performed by: INTERNAL MEDICINE

## 2024-01-09 RX ORDER — FUROSEMIDE 20 MG/1
1 TABLET ORAL DAILY
COMMUNITY
Start: 2024-01-04 | End: 2024-01-09 | Stop reason: SDUPTHER

## 2024-01-09 RX ORDER — POTASSIUM CHLORIDE 20 MEQ/1
20 TABLET, EXTENDED RELEASE ORAL 3 TIMES WEEKLY
Qty: 36 TABLET | Refills: 3 | Status: SHIPPED | OUTPATIENT
Start: 2024-01-10 | End: 2025-01-09

## 2024-01-09 RX ORDER — POTASSIUM CHLORIDE 20 MEQ/1
2 TABLET, EXTENDED RELEASE ORAL DAILY
COMMUNITY
Start: 2024-01-04 | End: 2024-01-09 | Stop reason: SDUPTHER

## 2024-01-09 RX ORDER — FUROSEMIDE 20 MG/1
20 TABLET ORAL 3 TIMES WEEKLY
Qty: 36 TABLET | Refills: 3 | Status: SHIPPED | OUTPATIENT
Start: 2024-01-10 | End: 2024-02-28 | Stop reason: SDUPTHER

## 2024-01-09 RX ORDER — DIGOXIN 125 MCG
125 TABLET ORAL DAILY
Qty: 90 TABLET | Refills: 3 | Status: SHIPPED | OUTPATIENT
Start: 2024-01-09 | End: 2024-05-08 | Stop reason: SDUPTHER

## 2024-01-09 RX ORDER — METOPROLOL SUCCINATE 25 MG/1
25 TABLET, EXTENDED RELEASE ORAL DAILY
Qty: 90 TABLET | Refills: 3 | Status: SHIPPED | OUTPATIENT
Start: 2024-01-09 | End: 2024-05-08 | Stop reason: WASHOUT

## 2024-01-09 RX ORDER — LOSARTAN POTASSIUM AND HYDROCHLOROTHIAZIDE 12.5; 5 MG/1; MG/1
1 TABLET ORAL DAILY
Qty: 30 TABLET | Refills: 11 | Status: SHIPPED | OUTPATIENT
Start: 2024-01-09 | End: 2025-01-08

## 2024-01-09 ASSESSMENT — ENCOUNTER SYMPTOMS
LOSS OF SENSATION IN FEET: 0
OCCASIONAL FEELINGS OF UNSTEADINESS: 0

## 2024-01-09 NOTE — PROGRESS NOTES
CARDIOLOGY PROGRESS NOTE       Patient:    Issa Webb    YOB: 1935  MRN:    [id number]    Date:   1/9/2024      Reason for Visit: TCM, post hospital follow-up, less than 7 days , CHF     .  IMPRESSION:      Posthospitalization follow-up, discharge 1/2/2024  Shortness of breath  Edema  Palpitations  Congestive heart failure, diastolic, acute on chronic  Chronic atrial fibrillation, rate controlled  Long-term anticoagulation, warfarin  High risk medications, digoxin  Coronary artery disease with mild CAD by catheterization 2008, negative CTA past.  Elevated calcium score, 29  Preserved left ventricular systolic function, LVEF 60 to 65%, echocardiogram 1/2024  LV diastolic dysfunction  Premature ventricular contractions  Hypertension  Hyperlipidemia  Hyperglycemia  Pulmonary hypertension  Tricuspid regurgitation  Obstructive sleep apnea on CPAP treatment  Never smoked  Family history of coronary artery disease  Otherwise as per assessment below.    RECOMMENDATIONS:      Patient appears to be improved post hospitalization for CHF.  His medications were adjusted.  Would suggest continuing off verapamil and he will continue on amlodipine 5 mg daily.  Would suggest adding Toprol-XL 25 mg daily.  He will restart his losartan 50/12.5 mg daily.  He will continue Lasix 20 mg Monday Wednesday Friday with 20 mEq KCl with each dose of his Lasix.  Digoxin 0.125 mg daily.  He will continue his other medications.  Refills were provided.    He will continue his warfarin.  Coumadin clinic follow-up with INR goal 2-3.    Exercise diet program was encouraged.  Hydration was encouraged.    [employer] portal use was encouraged.    We will plan to see back in 6 weeks with Laboratory Studies and ECG as noted below.     Patient will follow up with their primary physician for general care.    The patient knows to contact medical care earlier if need be.      HPI:     Issa Webb was seen in cardiac  evaluation at the  Cardiology office January 9, 2024.      The patients problems are listed as in the impression above.    Electronic medical records reviewed.    Patient is a former Dr. Laird patient.  This is my first visit to establish ongoing care.    He will also was just recently at [facility] for congestive heart failure I am seeing him back within a week of discharge for TCM posthospital care.    Patient is a pleasant 88-year-old hypertensive, hyperlipidemic, hyperglycemic gentleman with known diastolic LV dysfunction, chronic atrial fibrillation rate controlled on chronic warfarin and digoxin, obstructive sleep apnea on CPAP who has been doing well overall but noted worsening shortness of breath and edema prompting evaluation and treatment at the hospital.  While he was in the hospital he had an echocardiogram which noted ejection fraction 60 to 65% with diastolic dysfunction.  RVSP was 43.  No significant valvular heart disease was noted.  He had been on both verapamil and amlodipine combination prior.  His verapamil was discontinued.  His losartan was discontinued.  He was discharged on Lasix and potassium.  His edema has improved.  He feels the best he has in quite some time.    He does admit to occasional palpitations.  He has some shortness of breath but no residual edema.  He has no other cardiovascular complaints.    Patient denies Chest Pain, Lightheadedness, Dizziness, TIA or CVA symptoms.  No CHF or Edema. No GI,  or Bleeding Issues. No Recent Fever or Chills.     Cardiovascular and general review of systems is otherwise negative.    A 14-system review is otherwise negative, other than noted.    ALLERGIES:     Allergies   Allergen Reactions    Penicillins Swelling    Sulfa (Sulfonamide Antibiotics) Swelling      MEDICATIONS:     Current Outpatient Medications   Medication Instructions    amLODIPine (Norvasc) 5 mg tablet 1 tablet, oral, Nightly    aspirin 81 mg EC tablet  1 tablet, oral, Daily    digoxin (LANOXIN) 125 mcg, oral, Daily    furosemide (Lasix) 20 mg tablet 1 tablet, oral, Daily    potassium chloride CR 20 mEq ER tablet 2 tablets, oral, Daily    pravastatin (PRAVACHOL) 40 mg, oral, Nightly    tamsulosin (Flomax) 0.4 mg 24 hr capsule 1 capsule, oral, Daily    warfarin (COUMADIN) 5 mg, oral     PAST MEDICAL HISTORY:   As per the above impression.  No other significant past medical or surgical history.    SOCIAL HISTORY:   Lives with wife.  Never smoked.  No alcohol or illicit drug use.    FAMILY HISTORY:   Family history of coronary artery disease noted.    ELECTROCARDIOGRAM:      Atrial fibrillation, low voltage, nonspecific ST wave changes.  Rate 73.    CARDIAC TESTING:      Echocardiogram: 60-65%.  No significant valvular heart disease.  Diastolic dysfunction.  RVSP 43.    LABORATORY DATA:      Chem-7, CBC normal except for white count 11.5, potassium 3.3, glucose 138.  Magnesium 2.2.  Dig level 0.8.  INR 2.7.    VITALS:     Vitals:    01/09/24 0812   BP: 136/62       Wt Readings from Last 4 Encounters:   01/09/24 87.5 kg (193 lb)   09/21/23 88.5 kg (195 lb)   12/20/22 90.4 kg (199 lb 6.4 oz)   03/22/22 89.4 kg (197 lb)       PHYSICAL EXAMINATION:      General: No acute distress. Vital signs as noted. Alert and oriented.  Head And Neck Examination: No jugular venous distention, no carotid bruits, no mass. Carotid upstrokes preserved. Oral mucosa moist.  No xanthelasma. Head and neck examination otherwise unremarkable.  Lungs: Clear to auscultation and percussion. No wheezes, no rales,  and no rhonchi.  Chest: Excursion appeared to be normal. No chest wall tenderness on palpation.  Heart: Normal S1 and S2. No S3. No S4. No rub. Grade 1/6 systolic murmur, best heard at the left sternal border. Point of maximal impulse was within normal limits.  Abdomen: Soft. Nontender. No organomegaly. No bruits. No masses. Obese.  Extremities: No bipedal edema. No clubbing. No cyanosis.   Pulses are strong throughout. No bruits.  Musculoskeletal Exam: No ulcers, otherwise unremarkable.  Neuro: Neurologically appeared grossly intact.        PROBLEM LIST:     Patient Active Problem List   Diagnosis    CAD (coronary artery disease)    Dry eye syndrome, bilateral    History of retinal vein occlusion    History of YAG laser capsulotomy of lens of left eye    Hyperlipidemia    Hyperopia with astigmatism and presbyopia    Hypertension    Hypoxia    Impacted cerumen    Lumbago    Obstructive sleep apnea, adult    DANE on CPAP    Overweight (BMI 25.0-29.9)    Paroxysmal atrial fibrillation (CMS/HCC)    Permanent atrial fibrillation (CMS/HCC)    Posterior capsular opacification visually significant of left eye    Pseudophakia of both eyes    Posterior vitreous detachment of both eyes    PSVT (paroxysmal supraventricular tachycardia)    Pulmonary hypertension (CMS/HCC)    RPE mottling of macula    Sensory hearing loss, bilateral    Sinusitis, chronic    Cough    Snoring    Tinnitus    Tricuspid regurgitation    Vertical strabismus of left eye             Christopher Ramsey MD, [facility] / [facility] /  Cardiology      Of Note:  [employer] voice recognition dictation software was utilized partially in the preparation of this note, therefore, inaccuracies in spelling, word choice and punctuation may have occurred which were not recognized the time of signing.      Patient was seen and examined with total time of visit including chart preparation, rooming, and chart completion exceeding 40 minutes.      ----

## 2024-01-17 ENCOUNTER — OFFICE VISIT (OUTPATIENT)
Dept: PULMONOLOGY | Age: 89
End: 2024-01-17
Payer: MEDICARE

## 2024-01-17 ENCOUNTER — HOSPITAL ENCOUNTER (OUTPATIENT)
Dept: PHARMACY | Age: 89
Setting detail: THERAPIES SERIES
Discharge: HOME | End: 2024-01-17
Payer: MEDICARE

## 2024-01-17 VITALS
DIASTOLIC BLOOD PRESSURE: 72 MMHG | HEART RATE: 68 BPM | BODY MASS INDEX: 28.35 KG/M2 | WEIGHT: 192 LBS | OXYGEN SATURATION: 98 % | SYSTOLIC BLOOD PRESSURE: 114 MMHG

## 2024-01-17 DIAGNOSIS — G47.33 OSA ON CPAP: Primary | ICD-10-CM

## 2024-01-17 DIAGNOSIS — Z09 HOSPITAL DISCHARGE FOLLOW-UP: ICD-10-CM

## 2024-01-17 DIAGNOSIS — I48.20 CHRONIC ATRIAL FIBRILLATION (HCC): ICD-10-CM

## 2024-01-17 DIAGNOSIS — I48.0 PAROXYSMAL ATRIAL FIBRILLATION (HCC): Primary | ICD-10-CM

## 2024-01-17 DIAGNOSIS — E66.3 OVERWEIGHT (BMI 25.0-29.9): ICD-10-CM

## 2024-01-17 PROBLEM — D68.69 SECONDARY HYPERCOAGULABLE STATE (HCC): Status: ACTIVE | Noted: 2024-01-17

## 2024-01-17 LAB — INTERNATIONAL NORMALIZATION RATIO, POC: 1.6

## 2024-01-17 PROCEDURE — 85610 PROTHROMBIN TIME: CPT

## 2024-01-17 PROCEDURE — 99214 OFFICE O/P EST MOD 30 MIN: CPT | Performed by: INTERNAL MEDICINE

## 2024-01-17 PROCEDURE — 1111F DSCHRG MED/CURRENT MED MERGE: CPT | Performed by: INTERNAL MEDICINE

## 2024-01-17 PROCEDURE — 99212 OFFICE O/P EST SF 10 MIN: CPT

## 2024-01-17 PROCEDURE — 1123F ACP DISCUSS/DSCN MKR DOCD: CPT | Performed by: INTERNAL MEDICINE

## 2024-01-17 RX ORDER — POTASSIUM CHLORIDE 1500 MG/1
TABLET, EXTENDED RELEASE ORAL
COMMUNITY
Start: 2024-01-09

## 2024-01-17 RX ORDER — METOPROLOL SUCCINATE 25 MG/1
25 TABLET, EXTENDED RELEASE ORAL DAILY
COMMUNITY
Start: 2024-01-09 | End: 2025-01-08

## 2024-01-17 RX ORDER — WARFARIN SODIUM 5 MG/1
TABLET ORAL
Qty: 120 TABLET | Refills: 1 | Status: SHIPPED | OUTPATIENT
Start: 2024-01-17

## 2024-01-17 RX ORDER — LANOLIN ALCOHOL/MO/W.PET/CERES
400 CREAM (GRAM) TOPICAL DAILY
COMMUNITY

## 2024-01-17 ASSESSMENT — ENCOUNTER SYMPTOMS
SHORTNESS OF BREATH: 0
VOICE CHANGE: 0
VOMITING: 0
DIARRHEA: 0
RHINORRHEA: 0
WHEEZING: 0
CHEST TIGHTNESS: 0
ABDOMINAL PAIN: 0
NAUSEA: 0
EYE ITCHING: 0
SORE THROAT: 0
COUGH: 0

## 2024-01-17 NOTE — PROGRESS NOTES
Mr. Alex Carlson is a 88 y.o. y/o male with history of Afib who presents today for anticoagulation monitoring and adjustment.  INR 1.6 is sub therapeutic for this patient (goal range 2-3) and is reflective of 40 mg TWD  Patient verifies current dosing regimen, patient able to verbally recall dose  Patient reports 0.5 decreased dose since last INR while in [facility] (INR daily)  Patient denies s/sx clotting and/or stroke  Patient denies hematuria, epistaxis, rectal bleeding  Patient denies changes in alcohol, or tobacco use  Patient lost weight while in hospital (about 17 pounds)  Reviewed medication list and drug allergies with patient, updated any medication additions or modifications accordingly  Patient also denies any pending medical or dental procedures scheduled at this time  Refill sent to [facility] mailorder  Receipt confirmed by pharmacy (1/17/2024  9:28 AM EST)   Patient was instructed to boost today to 10 mg, then resume 40 mg TWD and RTC 2 weeks  For Pharmacy Admin Tracking Only    Intervention Detail: Dose Adjustment: 1, reason: Therapy Optimization  Total # of Interventions Recommended: 2  Total # of Interventions Accepted: 2  Time Spent (min): 15

## 2024-01-17 NOTE — PROGRESS NOTES
heart is enlarged.  Pulmonary vessels are prominent.  No airspace  consolidation.  No pneumothorax or pleural effusion.  Calcified granuloma  projected in the left mid lower lung over the cardiac shadow.    Impression  Cardiomegaly with mild pulmonary vascular congestion.    Assessment/Plan:     1. OSVALDO on CPAP  He  said he  received new CPAP , problems with humidityfor about 9 hours every night. CPAP with 7 cm.He is using CPAP with  nasal  Mask. C/o nasal congestion   He said  sleep is restful with the CPAP use.He is compliant with CPAP therapy and benefiting with CPAP use.No snoring with CPAP use.No complaint of daytime sleepiness or tiredness with CPAP use.     I reviewed compliance report with patient regarding CPAP therapy. He is using  CPAP for 28 days out of 30 days  Average usage of days used is 8 hours and 5 min , average AHI 13.9 with CPAP use. Still has  events more than 1o with obstructive  5.6 .     Change CPAP to Auto 7-10 cm     Counseling: CPAP/BiPAP uses, He advised to use CPAP at least 5-6 hours every night.    Driving: He is advised for extreme caution when driving or operating machinery if there is a feeling of drowsiness, especially while driving it is preferable to stop driving and take a brief nap.  Sleep hygiene:Avoid supine sleep, sleep on  sides. Avoid  sleep deprivation.  Explained sleep hygiene.  Advice to avoid Alcohol and sedative    Time spend over 30 min. Face to face.with greater than 50 % time with CPAP therapy including review compliance, counseling and advised regarding CPAP therapy.      2. Overweight (BMI 25.0-29.9)  He is advised try to lose weight. obesity related risk explained to the patient ,  Current weight:  87.1 kg (192 lb) Lbs. BMI:  Body mass index is 28.35 kg/m².  Suggested weight control approaches, including dietary changes , exercise, behavioral modification.    3. Chronic atrial fibrillation (HCC)  Follow with [facility]       Return in about 2 months (around 3/17/2024)

## 2024-01-31 ENCOUNTER — APPOINTMENT (OUTPATIENT)
Dept: PHARMACY | Age: 89
End: 2024-01-31
Payer: MEDICARE

## 2024-01-31 DIAGNOSIS — I48.0 PAROXYSMAL ATRIAL FIBRILLATION (HCC): Primary | ICD-10-CM

## 2024-01-31 LAB — INTERNATIONAL NORMALIZATION RATIO, POC: 1.8

## 2024-01-31 PROCEDURE — 99212 OFFICE O/P EST SF 10 MIN: CPT

## 2024-01-31 PROCEDURE — 85610 PROTHROMBIN TIME: CPT

## 2024-01-31 NOTE — PROGRESS NOTES
Mr. Alex Carlson is a 88 y.o. y/o male with history of Afib who presents today for anticoagulation monitoring and adjustment.  INR 1.8 is sub therapeutic for this patient (goal range 2-3) and is reflective of 37.5 mg TWD (maintenance plan 40 mg TWD)  Patient verifies current dosing regimen, patient able to verbally recall dose  Patient reports 1  missed dose since last INR   Patient denies s/sx clotting and/or stroke  Patient denies hematuria, epistaxis, rectal bleeding  Patient denies changes in diet, alcohol, or tobacco use  Reviewed medication list and drug allergies with patient, updated any medication additions or modifications accordingly  Patient also denies any pending medical or dental procedures scheduled at this time  Patient lost weight last month when inpatient and has still not regained (but hasn't lost more)  Last INR 1.6 on  post discharge from [facility] inpatient  Patient was instructed to boost to 10 mg today, then increase to 42.5 mg (6.2%) and RTC 2 weeks    For Pharmacy Admin Tracking Only    Intervention Detail: Adherence Monitorin and Dose Adjustment: 2, reason: Therapy Optimization  Total # of Interventions Recommended: 2  Total # of Interventions Accepted: 2  Time Spent (min): 15

## 2024-02-08 ENCOUNTER — HOSPITAL ENCOUNTER (OUTPATIENT)
Dept: LAB | Age: 89
Discharge: HOME | End: 2024-02-08
Payer: MEDICARE

## 2024-02-08 LAB
ALBUMIN SERPL-MCNC: 4.1 G/DL (ref 3.5–4.6)
ALP SERPL-CCNC: 74 U/L (ref 35–104)
ALT SERPL-CCNC: 17 U/L (ref 0–41)
ANION GAP SERPL CALCULATED.3IONS-SCNC: 10 MEQ/L (ref 9–15)
AST SERPL-CCNC: 21 U/L (ref 0–40)
BILIRUB DIRECT SERPL-MCNC: <0.2 MG/DL (ref 0–0.4)
BILIRUB INDIRECT SERPL-MCNC: NORMAL MG/DL (ref 0–0.6)
BILIRUB SERPL-MCNC: 0.7 MG/DL (ref 0.2–0.7)
BUN SERPL-MCNC: 21 MG/DL (ref 8–23)
CALCIUM SERPL-MCNC: 9.2 MG/DL (ref 8.5–9.9)
CHLORIDE SERPL-SCNC: 103 MEQ/L (ref 95–107)
CHOLEST SERPL-MCNC: 127 MG/DL (ref 0–199)
CO2 SERPL-SCNC: 27 MEQ/L (ref 20–31)
CREAT SERPL-MCNC: 0.95 MG/DL (ref 0.7–1.2)
ERYTHROCYTE [DISTWIDTH] IN BLOOD BY AUTOMATED COUNT: 13.1 % (ref 11.5–14.5)
GLUCOSE SERPL-MCNC: 97 MG/DL (ref 70–99)
HBA1C MFR BLD: 5.4 % (ref 4.8–5.9)
HCT VFR BLD AUTO: 43.1 % (ref 42–52)
HDLC SERPL-MCNC: 51 MG/DL (ref 40–59)
HGB BLD-MCNC: 14.5 G/DL (ref 14–18)
LDLC SERPL CALC-MCNC: 61 MG/DL (ref 0–129)
MAGNESIUM SERPL-MCNC: 2.2 MG/DL (ref 1.7–2.4)
MCH RBC QN AUTO: 34.4 PG (ref 27–31.3)
MCHC RBC AUTO-ENTMCNC: 33.6 % (ref 33–37)
MCV RBC AUTO: 102.1 FL (ref 79–92.2)
PLATELET # BLD AUTO: 170 K/UL (ref 130–400)
POTASSIUM SERPL-SCNC: 3.8 MEQ/L (ref 3.4–4.9)
PROT SERPL-MCNC: 7 G/DL (ref 6.3–8)
RBC # BLD AUTO: 4.22 M/UL (ref 4.7–6.1)
SODIUM SERPL-SCNC: 140 MEQ/L (ref 135–144)
TRIGL SERPL-MCNC: 75 MG/DL (ref 0–150)
TSH SERPL-MCNC: 3.16 UIU/ML (ref 0.44–3.86)
WBC # BLD AUTO: 5.4 K/UL (ref 4.8–10.8)

## 2024-02-08 PROCEDURE — 80061 LIPID PANEL: CPT

## 2024-02-08 PROCEDURE — 84443 ASSAY THYROID STIM HORMONE: CPT

## 2024-02-08 PROCEDURE — 83735 ASSAY OF MAGNESIUM: CPT

## 2024-02-08 PROCEDURE — 80299 QUANTITATIVE ASSAY DRUG: CPT

## 2024-02-08 PROCEDURE — 80076 HEPATIC FUNCTION PANEL: CPT

## 2024-02-08 PROCEDURE — 85027 COMPLETE CBC AUTOMATED: CPT

## 2024-02-08 PROCEDURE — 36415 COLL VENOUS BLD VENIPUNCTURE: CPT

## 2024-02-08 PROCEDURE — 80048 BASIC METABOLIC PNL TOTAL CA: CPT

## 2024-02-08 PROCEDURE — 83036 HEMOGLOBIN GLYCOSYLATED A1C: CPT

## 2024-02-14 ENCOUNTER — HOSPITAL ENCOUNTER (OUTPATIENT)
Dept: PHARMACY | Age: 89
Setting detail: THERAPIES SERIES
Discharge: HOME | End: 2024-02-14
Payer: MEDICARE

## 2024-02-14 DIAGNOSIS — I48.0 PAROXYSMAL ATRIAL FIBRILLATION (HCC): Primary | ICD-10-CM

## 2024-02-14 LAB
INTERNATIONAL NORMALIZATION RATIO, POC: 3.3
Lab: NORMAL NG/ML

## 2024-02-14 PROCEDURE — 99213 OFFICE O/P EST LOW 20 MIN: CPT

## 2024-02-14 PROCEDURE — 85610 PROTHROMBIN TIME: CPT

## 2024-02-14 NOTE — PROGRESS NOTES
Mr. Alex Carlson is a 88 y.o. y/o male with history of Afib who presents today for anticoagulation monitoring and adjustment.  INR 3.3 is supra therapeutic for this patient (goal range 2-3) and is reflective of 42.5 mg TWD  Patient verifies current dosing regimen, patient able to verbally recall dose  Patient reports 0  missed doses since last INR   Patient denies s/sx clotting and/or stroke  Patient denies hematuria, epistaxis, rectal bleeding  Patient denies changes in diet, alcohol, or tobacco use  Reviewed medication list and drug allergies with patient, updated any medication additions or modifications accordingly  Patient also denies any pending medical or dental procedures scheduled at this time  Patient was instructed to take warfarin 2.5 mg today (usual 5 mg), then decrease to 40 mg TWD (5.9%) and RTC 2 weeks  For Pharmacy Admin Tracking Only    Intervention Detail: Dose Adjustment: 1, reason: Therapy Optimization  Total # of Interventions Recommended: 2  Total # of Interventions Accepted: 2  Time Spent (min): 15

## 2024-02-28 ENCOUNTER — APPOINTMENT (OUTPATIENT)
Dept: PHARMACY | Age: 89
End: 2024-02-28
Payer: MEDICARE

## 2024-02-28 DIAGNOSIS — I47.10 PSVT (PAROXYSMAL SUPRAVENTRICULAR TACHYCARDIA) (CMS-HCC): ICD-10-CM

## 2024-02-28 DIAGNOSIS — I36.1 NONRHEUMATIC TRICUSPID VALVE REGURGITATION: ICD-10-CM

## 2024-02-28 DIAGNOSIS — Z79.899 HIGH RISK MEDICATION USE: ICD-10-CM

## 2024-02-28 DIAGNOSIS — I48.0 PAROXYSMAL ATRIAL FIBRILLATION (HCC): Primary | ICD-10-CM

## 2024-02-28 DIAGNOSIS — I48.0 PAROXYSMAL ATRIAL FIBRILLATION (MULTI): ICD-10-CM

## 2024-02-28 DIAGNOSIS — Z79.01 CURRENT USE OF LONG TERM ANTICOAGULATION: ICD-10-CM

## 2024-02-28 DIAGNOSIS — R06.83 SNORING: ICD-10-CM

## 2024-02-28 DIAGNOSIS — I27.20 PULMONARY HYPERTENSION (MULTI): ICD-10-CM

## 2024-02-28 DIAGNOSIS — R06.09 DOE (DYSPNEA ON EXERTION): ICD-10-CM

## 2024-02-28 DIAGNOSIS — I50.31 ACUTE DIASTOLIC HEART FAILURE (MULTI): ICD-10-CM

## 2024-02-28 DIAGNOSIS — E78.2 MIXED HYPERLIPIDEMIA: ICD-10-CM

## 2024-02-28 DIAGNOSIS — R73.9 HYPERGLYCEMIA: ICD-10-CM

## 2024-02-28 DIAGNOSIS — G47.33 OBSTRUCTIVE SLEEP APNEA, ADULT: ICD-10-CM

## 2024-02-28 DIAGNOSIS — G47.33 OSA ON CPAP: ICD-10-CM

## 2024-02-28 DIAGNOSIS — R60.0 LOCALIZED EDEMA: ICD-10-CM

## 2024-02-28 DIAGNOSIS — I10 PRIMARY HYPERTENSION: ICD-10-CM

## 2024-02-28 DIAGNOSIS — I25.10 CORONARY ARTERY DISEASE INVOLVING NATIVE CORONARY ARTERY OF NATIVE HEART WITHOUT ANGINA PECTORIS: ICD-10-CM

## 2024-02-28 LAB — INTERNATIONAL NORMALIZATION RATIO, POC: 2.8

## 2024-02-28 PROCEDURE — 85610 PROTHROMBIN TIME: CPT

## 2024-02-28 PROCEDURE — 99211 OFF/OP EST MAY X REQ PHY/QHP: CPT

## 2024-02-28 RX ORDER — FUROSEMIDE 20 MG/1
20 TABLET ORAL 3 TIMES WEEKLY
Qty: 36 TABLET | Refills: 3 | Status: SHIPPED | OUTPATIENT
Start: 2024-02-28 | End: 2025-02-27

## 2024-02-28 NOTE — PROGRESS NOTES
Mr. Alex Carlson is a 88 y.o. y/o male with history of Afib who presents today for anticoagulation monitoring and adjustment.  INR 2.8 is therapeutic for this patient (goal range 2-3) and is reflective of 40 mg TWD  Patient verifies current dosing regimen, patient able to verbally recall dose  Patient reports 0  missed doses since last INR   Patient denies s/sx clotting and/or stroke  Patient denies hematuria, epistaxis, rectal bleeding  Patient denies changes in diet, alcohol, or tobacco use  Reviewed medication list and drug allergies with patient, updated any medication additions or modifications accordingly  Patient also denies any pending medical or dental procedures scheduled at this time  Patient was instructed to continue 40 mg TWD and RTC 3 weeks  For Pharmacy Admin Tracking Only    Intervention Detail: Adherence Monitorin  Total # of Interventions Recommended: 1  Total # of Interventions Accepted: 1  Time Spent (min): 15

## 2024-03-21 ENCOUNTER — TELEPHONE (OUTPATIENT)
Dept: PHARMACY | Age: 89
End: 2024-03-21

## 2024-03-22 ENCOUNTER — APPOINTMENT (OUTPATIENT)
Dept: CARDIOLOGY | Facility: CLINIC | Age: 89
End: 2024-03-22
Payer: MEDICARE

## 2024-03-27 ENCOUNTER — HOSPITAL ENCOUNTER (OUTPATIENT)
Dept: PHARMACY | Age: 89
Setting detail: THERAPIES SERIES
Discharge: HOME | End: 2024-03-27
Payer: MEDICARE

## 2024-03-27 DIAGNOSIS — I48.91 ATRIAL FIBRILLATION, UNSPECIFIED TYPE (HCC): Primary | ICD-10-CM

## 2024-03-27 LAB — INTERNATIONAL NORMALIZATION RATIO, POC: 2.1

## 2024-03-27 PROCEDURE — 99211 OFF/OP EST MAY X REQ PHY/QHP: CPT

## 2024-03-27 PROCEDURE — 85610 PROTHROMBIN TIME: CPT

## 2024-03-27 NOTE — PROGRESS NOTES
Mr. Alex Carlson is a 88 y.o. y/o male with history of Afib who presents today for anticoagulation monitoring and adjustment.  INR 2.1 is therapeutic for this patient (goal range 2-3) and is reflective of 40 mg TWD  Patient verifies current dosing regimen, patient able to verbally recall dose  Patient reports 0  missed doses since last INR   Patient denies s/sx clotting and/or stroke  Patient denies hematuria, epistaxis, rectal bleeding  Patient denies changes in diet, alcohol, or tobacco use  Reviewed medication list and drug allergies with patient, updated any medication additions or modifications accordingly  Patient also denies any pending medical or dental procedures scheduled at this time  Patient was instructed to continue 40 mg TWD and RTC 4 weeks    For Pharmacy Admin Tracking Only    Intervention Detail: Adherence Monitorin  Total # of Interventions Recommended: 1  Total # of Interventions Accepted: 1  Time Spent (min): 15

## 2024-04-07 ASSESSMENT — ENCOUNTER SYMPTOMS
DIARRHEA: 0
HEADACHES: 0
TROUBLE SWALLOWING: 0
STRIDOR: 0
VOMITING: 0
SWOLLEN GLANDS: 0
ABDOMINAL PAIN: 0
NECK PAIN: 0
HOARSE VOICE: 0
SHORTNESS OF BREATH: 1
COUGH: 1
SORE THROAT: 1

## 2024-04-09 ENCOUNTER — OFFICE VISIT (OUTPATIENT)
Dept: OTOLARYNGOLOGY | Facility: CLINIC | Age: 89
End: 2024-04-09
Payer: MEDICARE

## 2024-04-09 DIAGNOSIS — R49.0 DYSPHONIA: Primary | ICD-10-CM

## 2024-04-09 PROCEDURE — 31575 DIAGNOSTIC LARYNGOSCOPY: CPT | Performed by: PHYSICIAN ASSISTANT

## 2024-04-09 PROCEDURE — 1159F MED LIST DOCD IN RCRD: CPT | Performed by: PHYSICIAN ASSISTANT

## 2024-04-09 PROCEDURE — 99203 OFFICE O/P NEW LOW 30 MIN: CPT | Performed by: PHYSICIAN ASSISTANT

## 2024-04-09 ASSESSMENT — ENCOUNTER SYMPTOMS
ABDOMINAL PAIN: 0
COUGH: 1
STRIDOR: 0
TROUBLE SWALLOWING: 0
SORE THROAT: 1
HEADACHES: 0
VOMITING: 0
NECK PAIN: 0
SHORTNESS OF BREATH: 1
DIARRHEA: 0

## 2024-04-10 ENCOUNTER — OFFICE VISIT (OUTPATIENT)
Dept: PULMONOLOGY | Age: 89
End: 2024-04-10
Payer: MEDICARE

## 2024-04-10 VITALS
HEART RATE: 76 BPM | WEIGHT: 186 LBS | OXYGEN SATURATION: 98 % | DIASTOLIC BLOOD PRESSURE: 70 MMHG | TEMPERATURE: 97.7 F | SYSTOLIC BLOOD PRESSURE: 126 MMHG | BODY MASS INDEX: 27.47 KG/M2

## 2024-04-10 DIAGNOSIS — R09.81 NASAL CONGESTION: ICD-10-CM

## 2024-04-10 DIAGNOSIS — I48.20 CHRONIC ATRIAL FIBRILLATION (HCC): ICD-10-CM

## 2024-04-10 DIAGNOSIS — G47.33 OSA ON CPAP: Primary | ICD-10-CM

## 2024-04-10 DIAGNOSIS — E66.3 OVERWEIGHT (BMI 25.0-29.9): ICD-10-CM

## 2024-04-10 PROCEDURE — 99214 OFFICE O/P EST MOD 30 MIN: CPT | Performed by: INTERNAL MEDICINE

## 2024-04-10 PROCEDURE — 1123F ACP DISCUSS/DSCN MKR DOCD: CPT | Performed by: INTERNAL MEDICINE

## 2024-04-10 RX ORDER — FLUTICASONE PROPIONATE 50 MCG
1 SPRAY, SUSPENSION (ML) NASAL DAILY
Qty: 16 G | Refills: 0 | Status: SHIPPED | OUTPATIENT
Start: 2024-04-10

## 2024-04-10 ASSESSMENT — ENCOUNTER SYMPTOMS
COUGH: 0
SORE THROAT: 0
DIARRHEA: 0
ABDOMINAL PAIN: 0
SHORTNESS OF BREATH: 0
NAUSEA: 0
EYE ITCHING: 0
VOMITING: 0
CHEST TIGHTNESS: 0
WHEEZING: 0
RHINORRHEA: 0
VOICE CHANGE: 0

## 2024-04-10 NOTE — PROGRESS NOTES
exam?->CRC    FINDINGS:  The heart is enlarged.  Pulmonary vessels are prominent.  No airspace  consolidation.  No pneumothorax or pleural effusion.  Calcified granuloma  projected in the left mid lower lung over the cardiac shadow.    Impression  Cardiomegaly with mild pulmonary vascular congestion.      Assessment/Plan:     1. OSVALDO on CPAP  He is using CPAP for about 8 hours every night. CPAP with 7-10 cm.  He is using CPAP with  nasal  Mask. patient has large air leak .He said  sleep is restful with the CPAP use.He is compliant with CPAP therapy and benefiting with CPAP use.  No snoring with CPAP use.     I reviewed compliance report with patient regarding CPAP therapy. He is using  CPAP for 30 days out of 30 days. Average usage of days used is 8 hours and 14 min , average AHI 15.9 with CPAP use.    He was in [facility]  for dyspnea and treated for CHF, he  lost  18 lbs in 2 days and he felt better.     2. Overweight (BMI 25.0-29.9)  He is advised try to lose weight. obesity related risk explained to the patient ,  Current weight:  84.4 kg (186 lb) Lbs. BMI:  Body mass index is 27.47 kg/m².  Suggested weight control approaches, including dietary changes , exercise, behavioral modification.    3. Chronic atrial fibrillation (HCC)  Follow with cardiology     4. Nasal congestion  He is using OTC nasal spray. . Start Flonase      Return in about 4 months (around 8/10/2024) for osvaldo.      Alistair Borja MD

## 2024-04-24 ENCOUNTER — HOSPITAL ENCOUNTER (OUTPATIENT)
Dept: PHARMACY | Age: 89
Setting detail: THERAPIES SERIES
Discharge: HOME | End: 2024-04-24
Payer: MEDICARE

## 2024-04-24 LAB — INTERNATIONAL NORMALIZATION RATIO, POC: 3.8

## 2024-04-24 PROCEDURE — 85610 PROTHROMBIN TIME: CPT

## 2024-04-24 PROCEDURE — 99213 OFFICE O/P EST LOW 20 MIN: CPT

## 2024-04-24 NOTE — PROGRESS NOTES
Mr. Alex Carlson is a 89 y.o. y/o male with history of Afib who presents today for anticoagulation monitoring and adjustment.  INR 3.8 is supratherapeutic for this patient (goal range 2-3) and is reflective of 40 mg TWD  Patient verifies current dosing regimen, patient able to verbally recall dose  Patient reports 0 missed doses since last INR   Patient denies s/sx clotting and/or stroke  Patient denies hematuria, epistaxis, rectal bleeding  Patient denies changes in diet, alcohol, or tobacco use  Reviewed medication list and drug allergies with patient, updated any medication additions or modifications accordingly  Patient also denies any pending medical or dental procedures scheduled at this time  Patient was instructed to hold today's dose then resume 40 mg TWD and RTC 2 weeks    No discernible cause for elevated INR at this time, although patient states he has been under stress due to shaking of limbs which he has seen his PCP about    For Pharmacy Admin Tracking Only    Intervention Detail: Dose Adjustment: 1, reason: Therapy De-escalation  Total # of Interventions Recommended: 1  Total # of Interventions Accepted: 1  Time Spent (min): 15

## 2024-05-07 ENCOUNTER — APPOINTMENT (OUTPATIENT)
Dept: GENERAL RADIOLOGY | Age: 89
End: 2024-05-07
Payer: MEDICARE

## 2024-05-07 ENCOUNTER — HOSPITAL ENCOUNTER (EMERGENCY)
Age: 89
Discharge: HOME | End: 2024-05-07
Attending: EMERGENCY MEDICINE
Payer: MEDICARE

## 2024-05-07 VITALS
TEMPERATURE: 98 F | BODY MASS INDEX: 26.2 KG/M2 | OXYGEN SATURATION: 97 % | SYSTOLIC BLOOD PRESSURE: 142 MMHG | RESPIRATION RATE: 18 BRPM | HEIGHT: 70 IN | HEART RATE: 53 BPM | WEIGHT: 183 LBS | DIASTOLIC BLOOD PRESSURE: 121 MMHG

## 2024-05-07 DIAGNOSIS — R00.1 BRADYCARDIA: ICD-10-CM

## 2024-05-07 DIAGNOSIS — R53.83 FATIGUE, UNSPECIFIED TYPE: Primary | ICD-10-CM

## 2024-05-07 LAB
ANION GAP SERPL CALCULATED.3IONS-SCNC: 12 MEQ/L (ref 9–15)
APTT PPP: 31.5 SEC (ref 24.4–36.8)
BACTERIA URNS QL MICRO: NEGATIVE /HPF
BASOPHILS # BLD: 0 K/UL (ref 0–0.1)
BASOPHILS NFR BLD: 0.4 % (ref 0.2–1.2)
BILIRUB UR QL STRIP: NEGATIVE
BNP BLD-MCNC: 1103 PG/ML
BUN SERPL-MCNC: 19 MG/DL (ref 8–23)
CALCIUM SERPL-MCNC: 9.9 MG/DL (ref 8.5–9.9)
CHLORIDE SERPL-SCNC: 101 MEQ/L (ref 95–107)
CLARITY UR: CLEAR
CO2 SERPL-SCNC: 27 MEQ/L (ref 20–31)
COLOR UR: YELLOW
CREAT SERPL-MCNC: 1 MG/DL (ref 0.7–1.2)
EOSINOPHIL # BLD: 0.1 K/UL (ref 0–0.5)
EOSINOPHIL NFR BLD: 0.7 % (ref 0.8–7)
EPI CELLS #/AREA URNS HPF: NORMAL /HPF
ERYTHROCYTE [DISTWIDTH] IN BLOOD BY AUTOMATED COUNT: 13.2 % (ref 11.6–14.4)
GLUCOSE SERPL-MCNC: 96 MG/DL (ref 70–99)
GLUCOSE UR STRIP-MCNC: NEGATIVE MG/DL
HCT VFR BLD AUTO: 46.2 % (ref 42–52)
HGB BLD-MCNC: 16.3 G/DL (ref 13.7–17.5)
HGB UR QL STRIP: ABNORMAL
IMM GRANULOCYTES # BLD: 0 K/UL
IMM GRANULOCYTES NFR BLD: 0.3 %
INFLUENZA A BY PCR: NEGATIVE
INFLUENZA B BY PCR: NEGATIVE
INR PPP: 2.4
KETONES UR STRIP-MCNC: NEGATIVE MG/DL
LEUKOCYTE ESTERASE UR QL STRIP: NEGATIVE
LYMPHOCYTES # BLD: 3.1 K/UL (ref 1.3–3.6)
LYMPHOCYTES NFR BLD: 33.3 %
MCH RBC QN AUTO: 35.4 PG (ref 25.7–32.2)
MCHC RBC AUTO-ENTMCNC: 35.3 % (ref 32.3–36.5)
MCV RBC AUTO: 100.2 FL (ref 79–92.2)
MONOCYTES # BLD: 1.1 K/UL (ref 0.3–0.8)
MONOCYTES NFR BLD: 12.4 % (ref 5.3–12.2)
NEUTROPHILS # BLD: 4.9 K/UL (ref 1.8–5.4)
NEUTS SEG NFR BLD: 52.9 % (ref 34–67.9)
NITRITE UR QL STRIP: NEGATIVE
PH UR STRIP: 7.5 [PH] (ref 5–9)
PLATELET # BLD AUTO: 198 K/UL (ref 163–337)
POTASSIUM SERPL-SCNC: 3.9 MEQ/L (ref 3.4–4.9)
PROT UR STRIP-MCNC: 30 MG/DL
PROTHROMBIN TIME: 26.3 SEC (ref 12.3–14.9)
RBC # BLD AUTO: 4.61 M/UL (ref 4.63–6.08)
RBC #/AREA URNS HPF: NORMAL /HPF (ref 0–2)
SARS-COV-2 RDRP RESP QL NAA+PROBE: NOT DETECTED
SODIUM SERPL-SCNC: 140 MEQ/L (ref 135–144)
SP GR UR STRIP: 1.02 (ref 1–1.03)
TROPONIN, HIGH SENSITIVITY: 13 NG/L (ref 0–19)
URINE REFLEX TO CULTURE: ABNORMAL
UROBILINOGEN UR STRIP-ACNC: 0.2 E.U./DL
WBC # BLD AUTO: 9.2 K/UL (ref 4.2–9)
WBC #/AREA URNS HPF: NORMAL /HPF (ref 0–5)

## 2024-05-07 PROCEDURE — 85610 PROTHROMBIN TIME: CPT

## 2024-05-07 PROCEDURE — 85025 COMPLETE CBC W/AUTO DIFF WBC: CPT

## 2024-05-07 PROCEDURE — 36415 COLL VENOUS BLD VENIPUNCTURE: CPT

## 2024-05-07 PROCEDURE — 87635 SARS-COV-2 COVID-19 AMP PRB: CPT

## 2024-05-07 PROCEDURE — 93005 ELECTROCARDIOGRAM TRACING: CPT

## 2024-05-07 PROCEDURE — 85730 THROMBOPLASTIN TIME PARTIAL: CPT

## 2024-05-07 PROCEDURE — 84484 ASSAY OF TROPONIN QUANT: CPT

## 2024-05-07 PROCEDURE — 71045 X-RAY EXAM CHEST 1 VIEW: CPT

## 2024-05-07 PROCEDURE — 80048 BASIC METABOLIC PNL TOTAL CA: CPT

## 2024-05-07 PROCEDURE — 99285 EMERGENCY DEPT VISIT HI MDM: CPT

## 2024-05-07 PROCEDURE — 81001 URINALYSIS AUTO W/SCOPE: CPT

## 2024-05-07 PROCEDURE — 83880 ASSAY OF NATRIURETIC PEPTIDE: CPT

## 2024-05-07 PROCEDURE — 87040 BLOOD CULTURE FOR BACTERIA: CPT

## 2024-05-07 PROCEDURE — 87502 INFLUENZA DNA AMP PROBE: CPT

## 2024-05-07 ASSESSMENT — ENCOUNTER SYMPTOMS
COLOR CHANGE: 0
EYE DISCHARGE: 0
DIARRHEA: 0
SHORTNESS OF BREATH: 0
VOMITING: 0
EYE REDNESS: 0
NAUSEA: 0
SINUS PAIN: 0
COUGH: 0
ABDOMINAL PAIN: 0
SORE THROAT: 0
BACK PAIN: 0

## 2024-05-07 ASSESSMENT — LIFESTYLE VARIABLES
HOW MANY STANDARD DRINKS CONTAINING ALCOHOL DO YOU HAVE ON A TYPICAL DAY: PATIENT DOES NOT DRINK
HOW OFTEN DO YOU HAVE A DRINK CONTAINING ALCOHOL: NEVER

## 2024-05-07 ASSESSMENT — PAIN - FUNCTIONAL ASSESSMENT: PAIN_FUNCTIONAL_ASSESSMENT: NONE - DENIES PAIN

## 2024-05-07 NOTE — ED PROVIDER NOTES
[facility] ED  EMERGENCY DEPARTMENT ENCOUNTER      Pt Name: Alex Carlson  MRN: [id number]  Birthdate 1935  Date of evaluation: 5/7/2024  Provider: Denia Vergara DO  12:09 PM    CHIEF COMPLAINT       Chief Complaint   Patient presents with    Fatigue     Pt c/o  not feeling right states he has been shakljuan a mehta  and no one can tell him why      Chief complaint: Sleepy/fatigue  History of chief complaint: This 89-year-old gentleman presents the emergency department complaining of chest feeling \"sleepy\" this morning.  Patient states he was okay when he got up and about 15 minutes prior to arrival just started feeling sleepy describes fatigue tired sensation states he checked his pulse ox with a finger probe they have at home and it was reading 39.  Patient denies any chest pain or palpitations no shortness of breath no cough cold congestion no fever nausea vomiting or diarrhea.  Patient states he does get intermittent shakiness in the morning.  Patient states has been having similar episodes every day for the last several week and no one can figure out what is going on.  Patient denies any dysuria hematuria frequency or urgency no leg pain or swelling.  No head or neck pain no double or blurry vision no focal numbness tingling or weakness.  Patient denies any dizziness or lightheaded reported in the chief complaint.  Patient denies any chest pain palpitations or heart racing sensation as reported in the initial triage.  Patient does have an underlying history of atrial fibrillation hypertension CHF weakness on anticoagulation with Coumadin.    Nursing Notes were reviewed.    REVIEW OF SYSTEMS       Review of Systems   Constitutional:  Positive for fatigue. Negative for diaphoresis and fever.   HENT:  Negative for congestion, sinus pain and sore throat.    Eyes:  Negative for discharge and redness.   Respiratory:  Negative for cough and shortness of breath.    Cardiovascular:  Negative for chest

## 2024-05-07 NOTE — ED TRIAGE NOTES
Pt a/ox3 skin w d intact   doesn't have any specific complaint  does state that he has been shaky latley and no one can tell him why.

## 2024-05-08 ENCOUNTER — OFFICE VISIT (OUTPATIENT)
Dept: CARDIOLOGY | Facility: CLINIC | Age: 89
End: 2024-05-08
Payer: MEDICARE

## 2024-05-08 ENCOUNTER — HOSPITAL ENCOUNTER (OUTPATIENT)
Dept: PHARMACY | Age: 89
Setting detail: THERAPIES SERIES
Discharge: HOME | End: 2024-05-08
Payer: MEDICARE

## 2024-05-08 VITALS
HEIGHT: 69 IN | HEART RATE: 55 BPM | WEIGHT: 188 LBS | BODY MASS INDEX: 27.85 KG/M2 | DIASTOLIC BLOOD PRESSURE: 60 MMHG | SYSTOLIC BLOOD PRESSURE: 124 MMHG

## 2024-05-08 DIAGNOSIS — I49.5 SSS (SICK SINUS SYNDROME) (MULTI): Primary | ICD-10-CM

## 2024-05-08 DIAGNOSIS — R60.0 LOCALIZED EDEMA: ICD-10-CM

## 2024-05-08 DIAGNOSIS — I48.91 ATRIAL FIBRILLATION, UNSPECIFIED TYPE (HCC): Primary | ICD-10-CM

## 2024-05-08 DIAGNOSIS — I48.0 PAROXYSMAL ATRIAL FIBRILLATION (MULTI): ICD-10-CM

## 2024-05-08 DIAGNOSIS — Z79.01 CURRENT USE OF LONG TERM ANTICOAGULATION: ICD-10-CM

## 2024-05-08 DIAGNOSIS — R00.1 BRADYCARDIA: ICD-10-CM

## 2024-05-08 DIAGNOSIS — R06.83 SNORING: ICD-10-CM

## 2024-05-08 DIAGNOSIS — I47.10 PSVT (PAROXYSMAL SUPRAVENTRICULAR TACHYCARDIA) (CMS-HCC): ICD-10-CM

## 2024-05-08 DIAGNOSIS — I25.10 CORONARY ARTERY DISEASE INVOLVING NATIVE CORONARY ARTERY OF NATIVE HEART WITHOUT ANGINA PECTORIS: ICD-10-CM

## 2024-05-08 DIAGNOSIS — E78.2 MIXED HYPERLIPIDEMIA: ICD-10-CM

## 2024-05-08 DIAGNOSIS — Z79.899 HIGH RISK MEDICATION USE: ICD-10-CM

## 2024-05-08 DIAGNOSIS — R94.31 ABNORMAL EKG: ICD-10-CM

## 2024-05-08 DIAGNOSIS — R55 NEAR SYNCOPE: ICD-10-CM

## 2024-05-08 DIAGNOSIS — G47.33 OSA ON CPAP: ICD-10-CM

## 2024-05-08 DIAGNOSIS — I10 PRIMARY HYPERTENSION: ICD-10-CM

## 2024-05-08 DIAGNOSIS — I27.20 PULMONARY HYPERTENSION (MULTI): ICD-10-CM

## 2024-05-08 DIAGNOSIS — R06.09 DOE (DYSPNEA ON EXERTION): ICD-10-CM

## 2024-05-08 DIAGNOSIS — I50.31 ACUTE DIASTOLIC HEART FAILURE (MULTI): ICD-10-CM

## 2024-05-08 DIAGNOSIS — R73.9 HYPERGLYCEMIA: ICD-10-CM

## 2024-05-08 DIAGNOSIS — G47.33 OBSTRUCTIVE SLEEP APNEA, ADULT: ICD-10-CM

## 2024-05-08 DIAGNOSIS — I36.1 NONRHEUMATIC TRICUSPID VALVE REGURGITATION: ICD-10-CM

## 2024-05-08 LAB
EKG ATRIAL RATE: 122 BPM
EKG Q-T INTERVAL: 438 MS
EKG QRS DURATION: 80 MS
EKG QTC CALCULATION (BAZETT): 438 MS
EKG R AXIS: -11 DEGREES
EKG T AXIS: 72 DEGREES
EKG VENTRICULAR RATE: 60 BPM
INTERNATIONAL NORMALIZATION RATIO, POC: 2.4

## 2024-05-08 PROCEDURE — 99215 OFFICE O/P EST HI 40 MIN: CPT | Performed by: INTERNAL MEDICINE

## 2024-05-08 PROCEDURE — 3074F SYST BP LT 130 MM HG: CPT | Performed by: INTERNAL MEDICINE

## 2024-05-08 PROCEDURE — 85610 PROTHROMBIN TIME: CPT

## 2024-05-08 PROCEDURE — 99212 OFFICE O/P EST SF 10 MIN: CPT

## 2024-05-08 PROCEDURE — 3078F DIAST BP <80 MM HG: CPT | Performed by: INTERNAL MEDICINE

## 2024-05-08 PROCEDURE — 1159F MED LIST DOCD IN RCRD: CPT | Performed by: INTERNAL MEDICINE

## 2024-05-08 PROCEDURE — 93000 ELECTROCARDIOGRAM COMPLETE: CPT | Performed by: INTERNAL MEDICINE

## 2024-05-08 RX ORDER — WARFARIN SODIUM 5 MG/1
TABLET ORAL
Qty: 120 TABLET | Refills: 1 | Status: SHIPPED | OUTPATIENT
Start: 2024-05-08

## 2024-05-08 RX ORDER — DIGOXIN 125 MCG
125 TABLET ORAL 3 TIMES WEEKLY
Start: 2024-05-08 | End: 2025-05-08

## 2024-05-08 NOTE — PROGRESS NOTES
Mr. Alex Carlson is a 89 y.o. y/o male with history of Afib who presents today for anticoagulation monitoring and adjustment.  INR 2.4 is therapeutic for this patient (goal range 2-3) and is reflective of 40 mg TWD  Patient verifies current dosing regimen, patient able to verbally recall dose  Patient reports 0 missed doses since last INR   Patient denies s/sx clotting and/or stroke  Patient denies hematuria, epistaxis, rectal bleeding  Patient denies changes in diet, alcohol, or tobacco use  Reviewed medication list and drug allergies with patient, updated any medication additions or modifications accordingly  Patient also denies any pending medical or dental procedures scheduled at this time  Patient was instructed to continue 40 mg TWD and RTC 3 weeks    Refill sent to [facility] per patient request     For Pharmacy Admin Tracking Only    Intervention Detail: Adherence Monitorin and Refill(s) Provided  Total # of Interventions Recommended: 1  Total # of Interventions Accepted: 1  Time Spent (min): 15

## 2024-05-08 NOTE — PROGRESS NOTES
CARDIOLOGY OFFICE NOTE     Date:   5/8/2024    Patient:    Issa Webb    YOB: 1935    Primary Physician: Octaviano Velasquez DO       Reason for Visit: Follow-up post hospital for near syncope and slow A-fib.    HPI:     Issa Webb was seen in cardiac evaluation at the  Cardiology office May 8, 2024.      The patients problems are listed as in the impression below.    Electronic medical records reviewed.    Pleasant 89-year-old hypertensive, hyperlipidemic, hyperglycemic gentleman with history of pulmonary hypertension, obstructive sleep apnea on CPAP treatment and history of chronic rate controlled atrial fibrillation..  He does have a history of mild coronary artery disease with preserved left ventricular function ejection fraction 65% by echocardiogram January 2024.    Patient returns early.  He has had 2 hospitalizations over the last few months with his last this past weekend he went to the emergency room at [facility] for near syncope weakness and was noted to have a slow atrial fibs with heart rate of 39 bpm.  Emergency room doctors did laboratory studies which were unremarkable otherwise.  Chest x-ray was negative.  He had his metoprolol succinate 25 mg discontinued.  He remains on digoxin 0.125 mg daily.    He states that he still feels quite poor with periods of near syncope and shaking.  It can happen at night as well.  He notes that he gets these profound weakness spells and shaking even using this stool.  He has had no true syncope.  He has no other symptomatology in general.    He does have 6 sleep apnea and uses a CPAP at night.    Patient denies Chest Pain, SOB, Lightheadedness, Dizziness, TIA or CVA symptoms.  No CHF or Edema.  No Palpitations.  No GI,  or Bleeding Issues. No Recent Fever or Chills.     Cardiovascular and general review of systems is otherwise negative.    A 14-system review is otherwise negative, other than noted.     PHYSICAL EXAMINATION:       Vitals:    05/08/24 1624   BP: 124/60   Pulse: 55     General: No acute distress. Vital signs as noted. Alert and oriented.  Head And Neck Examination: No jugular venous distention, no carotid bruits, no mass. Carotid upstrokes preserved. Oral mucosa moist.  No xanthelasma. Head and neck examination otherwise unremarkable.  Lungs: Clear to auscultation and percussion. No wheezes, no rales,  and no rhonchi.  Chest: Excursion appeared to be normal. No chest wall tenderness on palpation.  Heart: Normal S1 and S2. No S3. No S4. No rub. Grade 1/6 systolic murmur, best heard at the left sternal border. Point of maximal impulse was within normal limits.  Abdomen: Soft. Nontender. No organomegaly. No bruits. No masses.   Extremities: No bipedal edema. No clubbing. No cyanosis.  Pulses are strong throughout. No bruits.  Musculoskeletal Exam: No ulcers, otherwise unremarkable.  Neuro: Neurologically appeared grossly intact.    IMPRESSION:      Post hospitalization ER follow-up, discharge 5/7/2024  Near syncope with seizure-like symptomatology.  Symptomatic bradycardia, rates in the 30s during daytime.  Shortness of breath  Palpitations  Chronic atrial fibrillation, rate controlled  Sick sinus syndrome with greater than 2 second pauses.  Long-term anticoagulation, warfarin  High risk medications, digoxin  Abnormal EKG  Abnormal 48-hour Holter monitor, see below  Congestive heart failure, diastolic, acute on chronic  Coronary artery disease with mild CAD by catheterization 2008, negative CTA past.  Elevated calcium score, 29  Preserved left ventricular systolic function, LVEF 60 to 65%, echocardiogram 1/2024  LV diastolic dysfunction  Premature ventricular contractions  Hypertension  Hyperlipidemia  Hyperglycemia  Pulmonary hypertension  Tricuspid regurgitation  Obstructive sleep apnea on CPAP treatment  Never smoked  Family history of coronary artery disease  Otherwise as per assessment below.    RECOMMENDATIONS:       Patient presents for recurrent symptoms which sound to be consistent with sick sinus syndrome, atrial fibrillation with vagal stimulation and near syncope.  Patient had his metoprolol 25 mg daily discontinued in the emergency room.  He is on digoxin 0.125 mg daily and will backed this off to 0.125 Monday Wednesday Friday.  He does feel his heart pounding fast at times.  He currently feels weight weak following a recent near syncopal episode.    His symptoms warrant electrophysiology evaluation and pacemaker implantation.  Will have him see Dr. Becker electrophysiology as soon as possible.  Patient knows that if he has recurrent symptoms would suggest going to the nearest hospital for admission and further urgent evaluation if need be.    He is to refrain from driving at this point.  His wife states that she is driving him around.    Hydration was encouraged.    [employer] use was encouraged.    We will plan to see back in 3 weeks hopefully post pacemaker with Laboratory Studies and ECG as ordered.     Patient will follow up with their primary physician for general care.    The patient knows to contact medical care earlier if need be.      ALLERGIES:     Allergies   Allergen Reactions    Penicillins Swelling    Sulfa (Sulfonamide Antibiotics) Swelling        MEDICATIONS:     Current Outpatient Medications   Medication Instructions    amLODIPine (Norvasc) 5 mg tablet 1 tablet, oral, Nightly    aspirin 81 mg EC tablet 1 tablet, oral, Daily    digoxin (LANOXIN) 125 mcg, oral, Daily    furosemide (LASIX) 20 mg, oral, 3 times weekly    losartan-hydrochlorothiazide (Hyzaar) 50-12.5 mg tablet 1 tablet, oral, Daily    metoprolol succinate XL (TOPROL-XL) 25 mg, oral, Daily, Do not crush or chew.    potassium chloride CR 20 mEq ER tablet 20 mEq, oral, 3 times weekly    pravastatin (PRAVACHOL) 40 mg, oral, Nightly    tamsulosin (Flomax) 0.4 mg 24 hr capsule 1 capsule, oral, Daily    warfarin (COUMADIN) 5 mg, oral        ELECTROCARDIOGRAM:      Atrial fibrillation, poor R wave anterior progression, nonspecific ST-T wave changes, rate 55.    CARDIAC TESTIN Hour [facility]. 2023:  Predominant rhythm mechanism is atrial fibrillation. Average heart rate is 59 bpm ranging from 32 bpm during sleep to a maximum of 106 bpm. The extremes of heart rate are atrial fibrillation. Computer analysis suggested very frequent PVCs most of these represent aberrant conduction. No ventricular tachycardia. No significant pauses identified. Maximal pause 2.2 seconds during sleep. Average heart rate during sleeping hours 41 to 50 bpm. Heart rate frequently in the low 30s during sleeping hours. During this time the patient had no symptoms.     LABORATORY DATA:      Laboratory studies [facility]: Chem-7, CBC, TSH, magnesium, liver function studies, dig level normal.  Hemoglobin A1c 5.4.  Cholesterol 127, triglyceride 75, HDL 51, LDL 61.    Chest x-ray:  NAD                  PROBLEM LIST:     Patient Active Problem List   Diagnosis    CAD (coronary artery disease)    Dry eye syndrome, bilateral    History of retinal vein occlusion (CMS-[facility])    History of YAG laser capsulotomy of lens of left eye    Hyperlipidemia    Hyperopia with astigmatism and presbyopia    Hypertension    Hypoxia    Impacted cerumen    Lumbago    Obstructive sleep apnea, adult    DANE on CPAP    Overweight (BMI 25.0-29.9)    Paroxysmal atrial fibrillation (Multi)    Permanent atrial fibrillation (Multi)    Posterior capsular opacification visually significant of left eye    Pseudophakia of both eyes    Posterior vitreous detachment of both eyes    PSVT (paroxysmal supraventricular tachycardia) (CMS-[facility])    Pulmonary hypertension (Multi)    RPE mottling of macula    Sensory hearing loss, bilateral    Sinusitis, chronic    Cough    Snoring    Tinnitus    Tricuspid regurgitation    Vertical strabismus of left eye    Acute diastolic heart failure (Multi)    Localized edema     Hyperglycemia    High risk medication use    MAYNARD (dyspnea on exertion)    Current use of long term anticoagulation             Christopher Ramsey MD, [facility] /  Cardiology      Of Note:  [employer] voice recognition dictation software was utilized partially in the preparation of this note, therefore, inaccuracies in spelling, word choice and punctuation may have occurred which were not recognized the time of signing.    Patient was seen and examined with total time of visit including chart preparation, rooming, and chart completion exceeding 40 minutes.      ----

## 2024-05-09 ENCOUNTER — HOSPITAL ENCOUNTER (OUTPATIENT)
Dept: LAB | Age: 89
Discharge: HOME | End: 2024-05-09
Payer: MEDICARE

## 2024-05-09 LAB
ANION GAP SERPL CALCULATED.3IONS-SCNC: 11 MEQ/L (ref 9–15)
BUN SERPL-MCNC: 22 MG/DL (ref 8–23)
CALCIUM SERPL-MCNC: 9.7 MG/DL (ref 8.5–9.9)
CHLORIDE SERPL-SCNC: 103 MEQ/L (ref 95–107)
CO2 SERPL-SCNC: 27 MEQ/L (ref 20–31)
CREAT SERPL-MCNC: 1.11 MG/DL (ref 0.7–1.2)
DIGOXIN SERPL-MCNC: 0.8 NG/ML (ref 0.8–2)
ERYTHROCYTE [DISTWIDTH] IN BLOOD BY AUTOMATED COUNT: 13.3 % (ref 11.5–14.5)
GLUCOSE SERPL-MCNC: 130 MG/DL (ref 70–99)
HCT VFR BLD AUTO: 45 % (ref 42–52)
HGB BLD-MCNC: 15.3 G/DL (ref 14–18)
MCH RBC QN AUTO: 34.7 PG (ref 27–31.3)
MCHC RBC AUTO-ENTMCNC: 34 % (ref 33–37)
MCV RBC AUTO: 102 FL (ref 79–92.2)
PLATELET # BLD AUTO: 196 K/UL (ref 130–400)
POTASSIUM SERPL-SCNC: 3.6 MEQ/L (ref 3.4–4.9)
RBC # BLD AUTO: 4.41 M/UL (ref 4.7–6.1)
SODIUM SERPL-SCNC: 141 MEQ/L (ref 135–144)
WBC # BLD AUTO: 6.2 K/UL (ref 4.8–10.8)

## 2024-05-09 PROCEDURE — 36415 COLL VENOUS BLD VENIPUNCTURE: CPT

## 2024-05-09 PROCEDURE — 80048 BASIC METABOLIC PNL TOTAL CA: CPT

## 2024-05-09 PROCEDURE — 80162 ASSAY OF DIGOXIN TOTAL: CPT

## 2024-05-09 PROCEDURE — 85027 COMPLETE CBC AUTOMATED: CPT

## 2024-05-12 LAB
BACTERIA BLD CULT ORG #2: NORMAL
BACTERIA BLD CULT: NORMAL

## 2024-05-24 ENCOUNTER — OFFICE VISIT (OUTPATIENT)
Dept: CARDIOLOGY | Facility: CLINIC | Age: 89
End: 2024-05-24
Payer: MEDICARE

## 2024-05-24 VITALS
SYSTOLIC BLOOD PRESSURE: 134 MMHG | BODY MASS INDEX: 27.99 KG/M2 | DIASTOLIC BLOOD PRESSURE: 60 MMHG | HEIGHT: 69 IN | WEIGHT: 189 LBS | HEART RATE: 58 BPM

## 2024-05-24 DIAGNOSIS — G47.33 OBSTRUCTIVE SLEEP APNEA, ADULT: ICD-10-CM

## 2024-05-24 DIAGNOSIS — Z79.899 HIGH RISK MEDICATION USE: ICD-10-CM

## 2024-05-24 DIAGNOSIS — I10 PRIMARY HYPERTENSION: ICD-10-CM

## 2024-05-24 DIAGNOSIS — R94.31 ABNORMAL EKG: ICD-10-CM

## 2024-05-24 DIAGNOSIS — R55 NEAR SYNCOPE: ICD-10-CM

## 2024-05-24 DIAGNOSIS — I50.31 ACUTE DIASTOLIC HEART FAILURE (MULTI): ICD-10-CM

## 2024-05-24 DIAGNOSIS — R73.9 HYPERGLYCEMIA: ICD-10-CM

## 2024-05-24 DIAGNOSIS — E78.2 MIXED HYPERLIPIDEMIA: ICD-10-CM

## 2024-05-24 DIAGNOSIS — I48.0 PAROXYSMAL ATRIAL FIBRILLATION (MULTI): ICD-10-CM

## 2024-05-24 DIAGNOSIS — R06.09 DOE (DYSPNEA ON EXERTION): ICD-10-CM

## 2024-05-24 DIAGNOSIS — I49.5 SSS (SICK SINUS SYNDROME) (MULTI): ICD-10-CM

## 2024-05-24 DIAGNOSIS — I36.1 NONRHEUMATIC TRICUSPID VALVE REGURGITATION: ICD-10-CM

## 2024-05-24 DIAGNOSIS — Z79.01 CURRENT USE OF LONG TERM ANTICOAGULATION: ICD-10-CM

## 2024-05-24 DIAGNOSIS — I47.10 PSVT (PAROXYSMAL SUPRAVENTRICULAR TACHYCARDIA) (CMS-HCC): ICD-10-CM

## 2024-05-24 DIAGNOSIS — G47.33 OSA ON CPAP: ICD-10-CM

## 2024-05-24 DIAGNOSIS — I25.10 CORONARY ARTERY DISEASE INVOLVING NATIVE CORONARY ARTERY OF NATIVE HEART WITHOUT ANGINA PECTORIS: ICD-10-CM

## 2024-05-24 DIAGNOSIS — R60.0 LOCALIZED EDEMA: ICD-10-CM

## 2024-05-24 DIAGNOSIS — I27.20 PULMONARY HYPERTENSION (MULTI): ICD-10-CM

## 2024-05-24 DIAGNOSIS — Z78.9 NEVER SMOKED TOBACCO: ICD-10-CM

## 2024-05-24 DIAGNOSIS — R06.83 SNORING: ICD-10-CM

## 2024-05-24 DIAGNOSIS — R00.1 BRADYCARDIA: ICD-10-CM

## 2024-05-24 PROCEDURE — 99215 OFFICE O/P EST HI 40 MIN: CPT | Performed by: INTERNAL MEDICINE

## 2024-05-24 PROCEDURE — 93000 ELECTROCARDIOGRAM COMPLETE: CPT | Performed by: INTERNAL MEDICINE

## 2024-05-24 PROCEDURE — 1036F TOBACCO NON-USER: CPT | Performed by: INTERNAL MEDICINE

## 2024-05-24 PROCEDURE — 3075F SYST BP GE 130 - 139MM HG: CPT | Performed by: INTERNAL MEDICINE

## 2024-05-24 PROCEDURE — 3078F DIAST BP <80 MM HG: CPT | Performed by: INTERNAL MEDICINE

## 2024-05-24 PROCEDURE — 1159F MED LIST DOCD IN RCRD: CPT | Performed by: INTERNAL MEDICINE

## 2024-05-24 RX ORDER — CHOLECALCIFEROL (VITAMIN D3) 125 MCG
1 CAPSULE ORAL DAILY
COMMUNITY

## 2024-05-24 NOTE — PROGRESS NOTES
CARDIOLOGY OFFICE VISIT      CHIEF COMPLAINT  Chief Complaint   Patient presents with    New Patient Visit    Coronary Artery Disease       HISTORY OF PRESENT ILLNESS  HPI  89-year-old male with a past medical history of near syncope, seizure disorders, chronic atrial fibrillation on rate control strategy, sinus node dysfunction.  Long-term anticoagulant therapy with warfarin.  History of congestive heart failure diastolic.  Coronary artery disease with mild coronary artery disease by cardiac catheterization in 2008 and negative CTA in the past.  Left ventricular ejection fraction in January 2024 with a value of 60 to 65%.  Hypertension, hyperlipidemia.    Patient states that for the last 6 to 9 months, has been complaining of tremors and shakiness in both arms.  Patient was evaluated at [facility] for the symptoms also associated with near syncope and weakness.  No syncopal episodes.  He was noted to have atrial fibrillation with rates as low as 39 bpm.  Since then the dose of AV node blocking agents have been discussed or decrease.    Patient states that he continues having these episodes of shakiness.    EKG performed today shows atrial fibrillation rate of 60 bpm QRS duration 86 ms QT corrected 430 ms.  Rhythm strip shows the same pattern.        Echocardiogram 1/2024      Left Ventricle: Normal left ventricular systolic function with a   visually estimated EF of 60 - 65%. Left ventricle size is normal. Normal   wall thickness. Normal wall motion.     Tricuspid Valve: Mildly elevated RVSP. The estimated RVSP is 43 mmHg.     Left Atrium: Left atrium is mildly dilated.     Image quality is good.       Past Medical History  History reviewed. No pertinent past medical history.    Social History  Social History     Tobacco Use    Smoking status: Never    Smokeless tobacco: Never   Substance Use Topics    Alcohol use: Never    Drug use: Never       Family History   No family history on file.      Allergies:  Allergies   Allergen Reactions    Penicillins Swelling    Sulfa (Sulfonamide Antibiotics) Swelling        Outpatient Medications:  Current Outpatient Medications   Medication Instructions    amLODIPine (Norvasc) 5 mg tablet 1 tablet, oral, Nightly    aspirin 81 mg EC tablet 1 tablet, oral, Daily    cholecalciferol (Vitamin D-3) 125 MCG (5000 UT) capsule 1 capsule, oral, Daily    digoxin (LANOXIN) 125 mcg, oral, 3 times weekly    furosemide (LASIX) 20 mg, oral, 3 times weekly    losartan-hydrochlorothiazide (Hyzaar) 50-12.5 mg tablet 1 tablet, oral, Daily    potassium chloride CR 20 mEq ER tablet 20 mEq, oral, 3 times weekly    pravastatin (PRAVACHOL) 40 mg, oral, Nightly    tamsulosin (Flomax) 0.4 mg 24 hr capsule 1 capsule, oral, Daily    warfarin (COUMADIN) 5 mg, oral          REVIEW OF SYSTEMS  Review of Systems   All other systems reviewed and are negative.        VITALS  Vitals:    05/24/24 0928   BP: 134/60   Pulse: 58       PHYSICAL EXAM  Constitutional:       Appearance: Healthy appearance. Not in distress.   Neck:      Vascular: No JVR. JVD normal.   Pulmonary:      Effort: Pulmonary effort is normal.      Breath sounds: Normal breath sounds. No wheezing. No rhonchi. No rales.   Chest:      Chest wall: Not tender to palpatation.   Cardiovascular:      PMI at left midclavicular line. Normal rate. Irregularly irregular rhythm. Normal S1. Normal S2.       Murmurs: There is no murmur.      No gallop.  No click. No rub.   Pulses:     Intact distal pulses.   Edema:     Peripheral edema absent.   Abdominal:      General: Bowel sounds are normal.      Palpations: Abdomen is soft.      Tenderness: There is no abdominal tenderness.   Musculoskeletal: Normal range of motion.         General: No tenderness. Skin:     General: Skin is warm and dry.   Neurological:      General: No focal deficit present.      Mental Status: Alert and oriented to person, place and time.           ASSESSMENT AND  PLAN  Clinical impression    1.  Tremors  2.  Atrial fibrillation persistent-permanent on rate control strategy  3.  Long-term anticoagulant therapy with warfarin  4.  Seizure disorder  5.  Normal left ventricular function per echocardiogram as described above  6.  Mild coronary artery disease per catheterization in the past    Plan recommendations    I had a lengthy discussion with patient and family member regarding tremors, shakiness and also atrial fibrillation with possible slow ventricular rates.  During this evaluation patient has significant amount of tremors.  He states that his tremors usually last for 20 minutes and sometimes up to 2 hours and after that he feels well.  This tremors may be related with neurological events like Parkinson disease.  Patient was instructed to talk to primary care physician about this.  Even data, we will be evaluating him for possible symptomatic bradycardia.  Will obtain a Holter monitor for 48 hours today.  Patient will be seen my office in next week for reevaluation of possible pacemaker if they are symptomatic or significant bradycardia.    Continue with current dose of treatment for atrial fibrillation that includes digoxin 0.125 mg 3 times a week.    Risk factor modification and lifestyle modification discussed with patient. Diet , exercise and hydration discussed with patient.    I have personally review with patient during this office visit, laboratory data, echocardiogram results, stress test results, Holter-event monitor results prior and after the last electrophysiology visit. All questions has been answered.    Please excuse any errors in grammar or translation related to this dictation.  Voice recognition software was utilized to prepare this document.

## 2024-05-28 ENCOUNTER — OFFICE VISIT (OUTPATIENT)
Dept: CARDIOLOGY | Facility: CLINIC | Age: 89
End: 2024-05-28
Payer: MEDICARE

## 2024-05-28 VITALS
DIASTOLIC BLOOD PRESSURE: 70 MMHG | WEIGHT: 189 LBS | BODY MASS INDEX: 27.99 KG/M2 | SYSTOLIC BLOOD PRESSURE: 130 MMHG | HEIGHT: 69 IN | HEART RATE: 60 BPM

## 2024-05-28 DIAGNOSIS — R73.9 HYPERGLYCEMIA: ICD-10-CM

## 2024-05-28 DIAGNOSIS — G47.33 OBSTRUCTIVE SLEEP APNEA, ADULT: ICD-10-CM

## 2024-05-28 DIAGNOSIS — I25.10 CORONARY ARTERY DISEASE INVOLVING NATIVE CORONARY ARTERY OF NATIVE HEART WITHOUT ANGINA PECTORIS: ICD-10-CM

## 2024-05-28 DIAGNOSIS — R55 NEAR SYNCOPE: ICD-10-CM

## 2024-05-28 DIAGNOSIS — I36.1 NONRHEUMATIC TRICUSPID VALVE REGURGITATION: ICD-10-CM

## 2024-05-28 DIAGNOSIS — I10 PRIMARY HYPERTENSION: ICD-10-CM

## 2024-05-28 DIAGNOSIS — I48.0 PAROXYSMAL ATRIAL FIBRILLATION (MULTI): ICD-10-CM

## 2024-05-28 DIAGNOSIS — R94.31 ABNORMAL EKG: ICD-10-CM

## 2024-05-28 DIAGNOSIS — R00.1 BRADYCARDIA: ICD-10-CM

## 2024-05-28 DIAGNOSIS — E78.2 MIXED HYPERLIPIDEMIA: ICD-10-CM

## 2024-05-28 DIAGNOSIS — I50.31 ACUTE DIASTOLIC HEART FAILURE (MULTI): ICD-10-CM

## 2024-05-28 DIAGNOSIS — I27.20 PULMONARY HYPERTENSION (MULTI): ICD-10-CM

## 2024-05-28 DIAGNOSIS — I49.5 SSS (SICK SINUS SYNDROME) (MULTI): ICD-10-CM

## 2024-05-28 DIAGNOSIS — G47.33 OSA ON CPAP: ICD-10-CM

## 2024-05-28 DIAGNOSIS — R60.0 LOCALIZED EDEMA: ICD-10-CM

## 2024-05-28 DIAGNOSIS — Z79.01 CURRENT USE OF LONG TERM ANTICOAGULATION: ICD-10-CM

## 2024-05-28 DIAGNOSIS — Z79.899 HIGH RISK MEDICATION USE: ICD-10-CM

## 2024-05-28 DIAGNOSIS — I47.10 PSVT (PAROXYSMAL SUPRAVENTRICULAR TACHYCARDIA) (CMS-HCC): ICD-10-CM

## 2024-05-28 DIAGNOSIS — R06.83 SNORING: ICD-10-CM

## 2024-05-28 DIAGNOSIS — R06.09 DOE (DYSPNEA ON EXERTION): ICD-10-CM

## 2024-05-28 PROCEDURE — 99214 OFFICE O/P EST MOD 30 MIN: CPT | Performed by: INTERNAL MEDICINE

## 2024-05-28 PROCEDURE — 1159F MED LIST DOCD IN RCRD: CPT | Performed by: INTERNAL MEDICINE

## 2024-05-28 PROCEDURE — 3075F SYST BP GE 130 - 139MM HG: CPT | Performed by: INTERNAL MEDICINE

## 2024-05-28 PROCEDURE — 3078F DIAST BP <80 MM HG: CPT | Performed by: INTERNAL MEDICINE

## 2024-05-28 PROCEDURE — 1036F TOBACCO NON-USER: CPT | Performed by: INTERNAL MEDICINE

## 2024-05-28 RX ORDER — FLUTICASONE PROPIONATE 50 MCG
1 SPRAY, SUSPENSION (ML) NASAL DAILY PRN
COMMUNITY
Start: 2024-04-10

## 2024-05-28 RX ORDER — MAGNESIUM 250 MG
1 TABLET ORAL DAILY
COMMUNITY

## 2024-05-28 RX ORDER — PRAVASTATIN SODIUM 40 MG/1
40 TABLET ORAL NIGHTLY
Qty: 90 TABLET | Refills: 1 | Status: SHIPPED | OUTPATIENT
Start: 2024-05-28

## 2024-05-28 RX ORDER — AMLODIPINE BESYLATE 5 MG/1
5 TABLET ORAL NIGHTLY
Qty: 90 TABLET | Refills: 1 | Status: SHIPPED | OUTPATIENT
Start: 2024-05-28

## 2024-05-28 NOTE — PROGRESS NOTES
CARDIOLOGY OFFICE NOTE     Date:   5/28/2024    Patient:    Issa Webb    YOB: 1935    Primary Physician: Octaviano Velasquez DO       Reason for Visit: 3-month cardiology follow-up.    HPI:     Issa Webb was seen in cardiac evaluation at the  Cardiology office May 28, 2024.      The patients problems are listed as in the impression below.    Electronic medical records reviewed.    The patient returns with his wife.  Overall he is feeling much better.  He has less symptomatology in the form of palpitations and syncope.  He has had no recurrent syncope.  He does get some shakiness and some foggy head at times.  His wife related to anxiety.  Dr. Becker saw him electrophysiology evaluation and also suggested possible Parkinson's as a contributor.  He also notices some forgetfulness on how to do things that he used to do.    Patient denies any current Chest Pain, SOB, Lightheadedness, Dizziness, TIA or CVA symptoms.  No CHF or Edema.  No Palpitations.  No GI,  or Bleeding Issues. No Recent Fever or Chills.     Cardiovascular and general review of systems is otherwise negative.    A 14-system review is otherwise negative, other than noted.     PHYSICAL EXAMINATION:      Vitals:    05/28/24 1014   BP: 130/70   Pulse: 60     General: No acute distress. Vital signs as noted. Alert and oriented.  Head And Neck Examination: No jugular venous distention, no carotid bruits, no mass. Carotid upstrokes preserved. Oral mucosa moist.  No xanthelasma. Head and neck examination otherwise unremarkable.  Lungs: Clear to auscultation and percussion. No wheezes, no rales,  and no rhonchi.  Chest: Excursion appeared to be normal. No chest wall tenderness on palpation.  Heart: Normal S1 and S2. No S3. No S4. No rub. Grade 1/6 systolic murmur, best heard at the left sternal border. Point of maximal impulse was within normal limits.  Abdomen: Soft. Nontender. No organomegaly. No bruits. No masses.    Extremities: No bipedal edema. No clubbing. No cyanosis.  Pulses are strong throughout. No bruits.  Musculoskeletal Exam: No ulcers, otherwise unremarkable.  Neuro: Neurologically appeared grossly intact.     IMPRESSION:       Cardiovascular status stable  Near syncope with seizure-like symptomatology,   Symptomatic bradycardia, rates in the 30s during daytime, improved with decrease of medication.  Shortness of breath, improved  Palpitations, improved  Chronic atrial fibrillation, rate controlled  Sick sinus syndrome with greater than 2 second pauses.  Long-term anticoagulation, warfarin  High risk medications, digoxin  Abnormal EKG  Abnormal 48-hour Holter monitor, see below  Congestive heart failure, diastolic, acute on chronic  Coronary artery disease with mild CAD by catheterization 2008, negative CTA past.  Elevated calcium score, 29  Preserved left ventricular systolic function, LVEF 60 to 65%, echocardiogram 1/2024  LV diastolic dysfunction  Premature ventricular contractions  Hypertension  Hyperlipidemia  Hyperglycemia  Pulmonary hypertension  Tricuspid regurgitation  Obstructive sleep apnea on CPAP treatment  Never smoked  Family history of coronary artery disease  Otherwise as per assessment below.    RECOMMENDATIONS:      Overall patient continues to do well with reduced medications.  Dr. Becker ordered a 48-hour Holter monitor and has follow-up with him in June.    From our standpoint he will continue his current medications.    Refills were provided.    Exercise dietary walking program as tolerated.    Hydration was encouraged.    [employer] portal use was encouraged.    We will plan to see back in 4 months with Laboratory Studies and ECG as ordered.     Patient will follow up with their primary physician for general care.    The patient knows to contact medical care earlier if need be.      ALLERGIES:     Allergies   Allergen Reactions    Penicillins Swelling    Sulfa (Sulfonamide Antibiotics) Swelling         MEDICATIONS:     Current Outpatient Medications   Medication Instructions    amLODIPine (Norvasc) 5 mg tablet 1 tablet, oral, Nightly    aspirin 81 mg EC tablet 1 tablet, oral, Daily    cholecalciferol (Vitamin D-3) 125 MCG (5000 UT) capsule 1 capsule, oral, Daily    digoxin (LANOXIN) 125 mcg, oral, 3 times weekly    fluticasone (Flonase) 50 mcg/actuation nasal spray 1 spray, Each Nostril, Daily PRN    furosemide (LASIX) 20 mg, oral, 3 times weekly    losartan-hydrochlorothiazide (Hyzaar) 50-12.5 mg tablet 1 tablet, oral, Daily    magnesium 250 mg tablet 1 tablet, oral, Daily    potassium chloride CR 20 mEq ER tablet 20 mEq, oral, 3 times weekly    pravastatin (PRAVACHOL) 40 mg, oral, Nightly    tamsulosin (Flomax) 0.4 mg 24 hr capsule 1 capsule, oral, Daily    warfarin (COUMADIN) 5 mg, oral       ELECTROCARDIOGRAM:      None this visit    CARDIAC TESTING:      None this visit    LABORATORY DATA:      5/9/2024:  Chem-7, CBC normal except for glucose 130.  Dig 0.8.          PROBLEM LIST:     Patient Active Problem List   Diagnosis    CAD (coronary artery disease)    Dry eye syndrome, bilateral    History of retinal vein occlusion (CMS-[facility])    History of YAG laser capsulotomy of lens of left eye    Hyperlipidemia    Hyperopia with astigmatism and presbyopia    Hypertension    Hypoxia    Impacted cerumen    Lumbago    Obstructive sleep apnea, adult    DANE on CPAP    Overweight (BMI 25.0-29.9)    Paroxysmal atrial fibrillation (Multi)    Permanent atrial fibrillation (Multi)    Posterior capsular opacification visually significant of left eye    Pseudophakia of both eyes    Posterior vitreous detachment of both eyes    PSVT (paroxysmal supraventricular tachycardia) (CMS-HCC)    Pulmonary hypertension (Multi)    RPE mottling of macula    Sensory hearing loss, bilateral    Sinusitis, chronic    Cough    Snoring    Tinnitus    Tricuspid regurgitation    Vertical strabismus of left eye    Acute diastolic heart  failure (Multi)    Localized edema    Hyperglycemia    High risk medication use    MAYNARD (dyspnea on exertion)    Current use of long term anticoagulation    Near syncope    SSS (sick sinus syndrome) (Multi)    Bradycardia    Abnormal EKG             Christopher Ramsey MD, [facility] /  Cardiology      Of Note:  [employer] voice recognition dictation software was utilized partially in the preparation of this note, therefore, inaccuracies in spelling, word choice and punctuation may have occurred which were not recognized the time of signing.    Patient was seen and examined with total time of visit including chart preparation, rooming, and chart completion exceeding 40 minutes.      ----

## 2024-05-29 ENCOUNTER — HOSPITAL ENCOUNTER (OUTPATIENT)
Dept: PHARMACY | Age: 89
Setting detail: THERAPIES SERIES
Discharge: HOME | End: 2024-05-29
Payer: MEDICARE

## 2024-05-29 DIAGNOSIS — I48.91 ATRIAL FIBRILLATION, UNSPECIFIED TYPE (HCC): Primary | ICD-10-CM

## 2024-05-29 LAB — INTERNATIONAL NORMALIZATION RATIO, POC: 2.8

## 2024-05-29 PROCEDURE — 99211 OFF/OP EST MAY X REQ PHY/QHP: CPT | Performed by: PHARMACIST

## 2024-05-29 PROCEDURE — 85610 PROTHROMBIN TIME: CPT | Performed by: PHARMACIST

## 2024-05-29 NOTE — PROGRESS NOTES
Mr. Alex Carlson is a 89 y.o. y/o male with history of Afib who presents today for anticoagulation monitoring and adjustment.    INR 2.8 is therapeutic for this patient (goal range 2-3) and is reflective of 40 mg TWD  Patient verifies current dosing regimen, patient able to verbally recall dose  Patient reports no missed doses since last INR     Patient denies s/sx clotting and/or stroke  Patient denies hematuria, epistaxis, rectal bleeding  Patient denies changes in diet, alcohol, or tobacco use    Reviewed medication list; reports physician has eliminated some meds.  Patient will bring a list to next appointment    Patient also denies any pending medical or dental procedures scheduled at this time    Patient was instructed to maintain and RTC 5 weeks    For Pharmacy Admin Tracking Only    Intervention Detail: Adherence Monitorin  Total # of Interventions Recommended: 0  Total # of Interventions Accepted: 0  Time Spent (min): 15      Yvonne Lemus [facility]  2024  8:10 AM

## 2024-06-05 ENCOUNTER — ANCILLARY PROCEDURE (OUTPATIENT)
Dept: CARDIOLOGY | Facility: CLINIC | Age: 89
End: 2024-06-05
Payer: MEDICARE

## 2024-06-05 DIAGNOSIS — R55 NEAR SYNCOPE: ICD-10-CM

## 2024-06-05 DIAGNOSIS — R00.1 BRADYCARDIA: ICD-10-CM

## 2024-06-05 DIAGNOSIS — R94.31 ABNORMAL EKG: ICD-10-CM

## 2024-06-05 DIAGNOSIS — Z79.899 HIGH RISK MEDICATION USE: ICD-10-CM

## 2024-06-05 DIAGNOSIS — I48.0 PAROXYSMAL ATRIAL FIBRILLATION (MULTI): ICD-10-CM

## 2024-06-05 PROCEDURE — 93225 XTRNL ECG REC<48 HRS REC: CPT | Performed by: INTERNAL MEDICINE

## 2024-06-05 PROCEDURE — 93227 XTRNL ECG REC<48 HR R&I: CPT | Performed by: INTERNAL MEDICINE

## 2024-06-24 ENCOUNTER — APPOINTMENT (OUTPATIENT)
Dept: CARDIOLOGY | Facility: CLINIC | Age: 89
End: 2024-06-24
Payer: MEDICARE

## 2024-06-24 VITALS
BODY MASS INDEX: 27.93 KG/M2 | SYSTOLIC BLOOD PRESSURE: 124 MMHG | WEIGHT: 188.6 LBS | DIASTOLIC BLOOD PRESSURE: 64 MMHG | HEART RATE: 86 BPM | HEIGHT: 69 IN

## 2024-06-24 DIAGNOSIS — Z79.01 ANTICOAGULATION MANAGEMENT ENCOUNTER: Primary | ICD-10-CM

## 2024-06-24 DIAGNOSIS — I10 PRIMARY HYPERTENSION: ICD-10-CM

## 2024-06-24 DIAGNOSIS — Z51.81 ANTICOAGULATION MANAGEMENT ENCOUNTER: Primary | ICD-10-CM

## 2024-06-24 DIAGNOSIS — I48.19 PERSISTENT ATRIAL FIBRILLATION (MULTI): ICD-10-CM

## 2024-06-24 DIAGNOSIS — I25.10 CORONARY ARTERY DISEASE INVOLVING NATIVE CORONARY ARTERY OF NATIVE HEART WITHOUT ANGINA PECTORIS: ICD-10-CM

## 2024-06-24 DIAGNOSIS — Z78.9 NEVER SMOKED TOBACCO: ICD-10-CM

## 2024-06-24 DIAGNOSIS — Z79.899 HIGH RISK MEDICATION USE: ICD-10-CM

## 2024-06-24 PROCEDURE — 99214 OFFICE O/P EST MOD 30 MIN: CPT | Performed by: INTERNAL MEDICINE

## 2024-06-24 PROCEDURE — 1159F MED LIST DOCD IN RCRD: CPT | Performed by: INTERNAL MEDICINE

## 2024-06-24 PROCEDURE — 3074F SYST BP LT 130 MM HG: CPT | Performed by: INTERNAL MEDICINE

## 2024-06-24 PROCEDURE — 3078F DIAST BP <80 MM HG: CPT | Performed by: INTERNAL MEDICINE

## 2024-06-24 PROCEDURE — 1036F TOBACCO NON-USER: CPT | Performed by: INTERNAL MEDICINE

## 2024-06-24 RX ORDER — AMLODIPINE BESYLATE 10 MG/1
10 TABLET ORAL DAILY
Qty: 90 TABLET | Refills: 3 | Status: SHIPPED | OUTPATIENT
Start: 2024-06-24 | End: 2025-06-24

## 2024-06-24 NOTE — PROGRESS NOTES
CARDIOLOGY OFFICE VISIT      CHIEF COMPLAINT  Chief Complaint   Patient presents with    Results       HISTORY OF PRESENT ILLNESS  HPI  89-year-old male with a past medical history of near syncope, seizure disorders, chronic atrial fibrillation on rate control strategy, sinus node dysfunction.  Long-term anticoagulant therapy with warfarin.  History of congestive heart failure diastolic.  Coronary artery disease with mild coronary artery disease by cardiac catheterization in 2008 and negative CTA in the past.  Left ventricular ejection fraction in January 2024 with a value of 60 to 65%.  Hypertension, hyperlipidemia.     Patient states that for the last 6 to 9 months, has been complaining of tremors and shakiness in both arms.  Patient was evaluated at [facility] for the symptoms also associated with near syncope and weakness.  No syncopal episodes.  He was noted to have atrial fibrillation with rates as low as 39 bpm.  Since then the dose of AV node blocking agents have been discussed or decrease.     Patient states that he continues having these episodes of shakiness.    Holter monitor 06/2024    This is a Holter monitor for 48 hours.     The underlying rhythm was atrial fibrillation at a rate of 78 bpm.  Atrial fraction was the main rhythm during the study.  The rates are very well-controlled during atrial fibrillation.     The minimum heart rate was 51 bpm, the maximal heart rate was 130 beats minute, with an average heart rate of 59 bpm.     There were occasional sedated PVCs but no evidence sustained ventricular arrhythmias.     No significant pauses or evidence of high-grade AV block were seen during the study.  The longest R-R interval was 2.8 seconds at 2:09 PM.     Patient did not report symptoms during this study.     Conclusion     Underlying rhythm of atrial fibrillation.     Rate control during atrial fibrillation.  Clinical correlation is to be made patient continues having tremors  occasionally.    I have personally reviewed results of the Holter monitor.    Also patient had a dose of amlodipine last time thinking that amlodipine was the cause of shakiness.  No changes in his symptoms.  Blood pressure systolic now almost 140 mmHg.      Past Medical History  History reviewed. No pertinent past medical history.    Social History  Social History     Tobacco Use    Smoking status: Never    Smokeless tobacco: Never   Substance Use Topics    Alcohol use: Never    Drug use: Never       Family History   No family history on file.     Allergies:  Allergies   Allergen Reactions    Penicillins Swelling    Sulfa (Sulfonamide Antibiotics) Swelling        Outpatient Medications:  Current Outpatient Medications   Medication Instructions    amLODIPine (NORVASC) 5 mg, oral, Nightly    aspirin 81 mg EC tablet 1 tablet, oral, Daily    cholecalciferol (Vitamin D-3) 125 MCG (5000 UT) capsule 1 capsule, oral, Daily    digoxin (LANOXIN) 125 mcg, oral, 3 times weekly    fluticasone (Flonase) 50 mcg/actuation nasal spray 1 spray, Each Nostril, Daily PRN    furosemide (LASIX) 20 mg, oral, 3 times weekly    losartan-hydrochlorothiazide (Hyzaar) 50-12.5 mg tablet 1 tablet, oral, Daily    magnesium 250 mg tablet 1 tablet, oral, Daily    potassium chloride CR 20 mEq ER tablet 20 mEq, oral, 3 times weekly    pravastatin (PRAVACHOL) 40 mg, oral, Nightly    tamsulosin (Flomax) 0.4 mg 24 hr capsule 1 capsule, oral, Daily    warfarin (COUMADIN) 5 mg, oral          REVIEW OF SYSTEMS  Review of Systems   All other systems reviewed and are negative.        VITALS  Vitals:    06/24/24 1222   BP: 124/64   Pulse: 86       PHYSICAL EXAM  Constitutional:       Appearance: Healthy appearance. Not in distress.   Neck:      Vascular: No JVR. JVD normal.   Pulmonary:      Effort: Pulmonary effort is normal.      Breath sounds: Normal breath sounds. No wheezing. No rhonchi. No rales.   Chest:      Chest wall: Not tender to palpatation.    Cardiovascular:      PMI at left midclavicular line. Irregularly irregular rhythm. Normal S1. Normal S2.       Murmurs: There is no murmur.      No gallop.  No click. No rub.      Comments:     Pulses:     Intact distal pulses.   Edema:     Peripheral edema absent.   Abdominal:      General: Bowel sounds are normal.      Palpations: Abdomen is soft.      Tenderness: There is no abdominal tenderness.   Musculoskeletal: Normal range of motion.         General: No tenderness. Skin:     General: Skin is warm and dry.   Neurological:      General: No focal deficit present.      Mental Status: Alert and oriented to person, place and time.           ASSESSMENT AND PLAN  Clinical impression     1.  Tremors  2.  Atrial fibrillation persistent-permanent on rate control strategy  3.  Long-term anticoagulant therapy with warfarin  4.  Seizure disorder  5.  Normal left ventricular function per echocardiogram as described above  6.  Mild coronary artery disease per catheterization in the past    Plan-recommendations    From the electrophysiology standpoint he is stable.  Will continue with follow-ups in my office every 6 months or sooner if needed.    Continue rate control therapy for atrial fibrillation.    Patient was instructed to talk to primary care physician regarding shakiness-tremors.    Continue with anticoagulation therapy with warfarin.    Will increase the dose of Norvasc to 10 mg daily.    Risk factor modification and lifestyle modification discussed with patient. Diet , exercise and hydration discussed with patient.    I have personally review with patient during this office visit, laboratory data, echocardiogram results, stress test results, Holter-event monitor results prior and after the last electrophysiology visit. All questions has been answered.    Please excuse any errors in grammar or translation related to this dictation.  Voice recognition software was utilized to prepare this document.        Scribe  Attestation  By signing my name below, I, Marjorie Rosa LPN , Scribe   attest that this documentation has been prepared under the direction and in the presence of Saurav Becker MD.

## 2024-06-24 NOTE — PATIENT INSTRUCTIONS
Increase Amlodipine to 10 mg daily    -Check your blood pressure twice daily. Once in the morning 1-2 hours after morning medication and again at bedtime. Please keep a blood pressure diary and bring readings to your next appointment      -Please bring all medicines, vitamins, and herbal supplements with you in original bottles to every appointment!!!!    -Prescriptions will not be filled unless you are compliant with your follow up appointments or have a follow up appointment scheduled as per instruction of your physician. Refills should be requested at the time of your visit.

## 2024-07-03 ENCOUNTER — HOSPITAL ENCOUNTER (OUTPATIENT)
Dept: PHARMACY | Age: 89
Setting detail: THERAPIES SERIES
Discharge: HOME | End: 2024-07-03
Payer: MEDICARE

## 2024-07-03 DIAGNOSIS — I48.91 ATRIAL FIBRILLATION, UNSPECIFIED TYPE (HCC): Primary | ICD-10-CM

## 2024-07-03 LAB
INTERNATIONAL NORMALIZATION RATIO, POC: 2.8
PROTHROMBIN TIME, POC: NORMAL

## 2024-07-03 PROCEDURE — 85610 PROTHROMBIN TIME: CPT

## 2024-07-03 PROCEDURE — 99211 OFF/OP EST MAY X REQ PHY/QHP: CPT

## 2024-07-03 NOTE — PROGRESS NOTES
Mr. Alex Carlson is a 89 y.o. y/o male with history of Afib who presents today for anticoagulation monitoring and adjustment.  INR 2.8 is therapeutic for this patient (goal range 2-3) and is reflective of 40 mg TWD  Patient verifies current dosing regimen, patient able to verbally recall dose  Patient reports 0 missed doses since last INR   Patient denies s/sx clotting and/or stroke  Patient denies hematuria, epistaxis, rectal bleeding  Patient denies changes in diet, alcohol, or tobacco use  Reviewed medication list and drug allergies with patient, updated any medication additions or modifications accordingly  Patient also denies any pending medical or dental procedures scheduled at this time  Patient was instructed to continue 40 mg TWD and RTC 6 weeks    For Pharmacy Admin Tracking Only    Intervention Detail: Adherence Monitorin  Total # of Interventions Recommended: 1  Total # of Interventions Accepted: 1  Time Spent (min): 15

## 2024-07-12 ENCOUNTER — APPOINTMENT (OUTPATIENT)
Dept: CARDIOLOGY | Facility: CLINIC | Age: 89
End: 2024-07-12
Payer: MEDICARE

## 2024-08-16 ENCOUNTER — ANTI-COAG VISIT (OUTPATIENT)
Age: 89
End: 2024-08-16
Payer: MEDICARE

## 2024-08-16 DIAGNOSIS — I48.0 PAROXYSMAL ATRIAL FIBRILLATION (HCC): Primary | ICD-10-CM

## 2024-08-16 LAB
INTERNATIONAL NORMALIZATION RATIO, POC: 2.8
PROTHROMBIN TIME, POC: 0

## 2024-08-16 PROCEDURE — 99211 OFF/OP EST MAY X REQ PHY/QHP: CPT

## 2024-08-16 PROCEDURE — 85610 PROTHROMBIN TIME: CPT

## 2024-09-16 ENCOUNTER — HOSPITAL ENCOUNTER (OUTPATIENT)
Dept: LAB | Age: 89
Discharge: HOME | End: 2024-09-16
Payer: MEDICARE

## 2024-09-16 LAB
ALBUMIN SERPL-MCNC: 4.2 G/DL (ref 3.5–4.6)
ALP SERPL-CCNC: 94 U/L (ref 35–104)
ALT SERPL-CCNC: 13 U/L (ref 0–41)
ANION GAP SERPL CALCULATED.3IONS-SCNC: 12 MEQ/L (ref 9–15)
AST SERPL-CCNC: 20 U/L (ref 0–40)
BACTERIA URNS QL MICRO: NEGATIVE /HPF
BASOPHILS # BLD: 0.1 K/UL (ref 0–0.2)
BASOPHILS NFR BLD: 0.7 %
BILIRUB SERPL-MCNC: 0.9 MG/DL (ref 0.2–0.7)
BILIRUB UR QL STRIP: NEGATIVE
BUN SERPL-MCNC: 20 MG/DL (ref 8–23)
CALCIUM SERPL-MCNC: 9.4 MG/DL (ref 8.5–9.9)
CHLORIDE SERPL-SCNC: 105 MEQ/L (ref 95–107)
CLARITY UR: ABNORMAL
CO2 SERPL-SCNC: 27 MEQ/L (ref 20–31)
COLOR UR: YELLOW
CREAT SERPL-MCNC: 0.99 MG/DL (ref 0.7–1.2)
EOSINOPHIL # BLD: 0.3 K/UL (ref 0–0.7)
EOSINOPHIL NFR BLD: 3.6 %
EPI CELLS #/AREA URNS AUTO: ABNORMAL /HPF (ref 0–5)
ERYTHROCYTE [DISTWIDTH] IN BLOOD BY AUTOMATED COUNT: 13.1 % (ref 11.5–14.5)
GLOBULIN SER CALC-MCNC: 3.2 G/DL (ref 2.3–3.5)
GLUCOSE SERPL-MCNC: 101 MG/DL (ref 70–99)
GLUCOSE UR STRIP-MCNC: NEGATIVE MG/DL
HCT VFR BLD AUTO: 42.4 % (ref 42–52)
HGB BLD-MCNC: 14.8 G/DL (ref 14–18)
HGB UR QL STRIP: NEGATIVE
HYALINE CASTS #/AREA URNS AUTO: ABNORMAL /HPF (ref 0–5)
IRON % SATURATION: 34 % (ref 20–55)
IRON: 99 UG/DL (ref 61–157)
KETONES UR STRIP-MCNC: NEGATIVE MG/DL
LEUKOCYTE ESTERASE UR QL STRIP: NEGATIVE
LYMPHOCYTES # BLD: 2.5 K/UL (ref 1–4.8)
LYMPHOCYTES NFR BLD: 36.5 %
MAGNESIUM SERPL-MCNC: 2.2 MG/DL (ref 1.7–2.4)
MCH RBC QN AUTO: 35.2 PG (ref 27–31.3)
MCHC RBC AUTO-ENTMCNC: 34.9 % (ref 33–37)
MCV RBC AUTO: 101 FL (ref 79–92.2)
MONOCYTES # BLD: 0.8 K/UL (ref 0.2–0.8)
MONOCYTES NFR BLD: 11.1 %
NEUTROPHILS # BLD: 3.3 K/UL (ref 1.4–6.5)
NEUTS SEG NFR BLD: 47.8 %
NITRITE UR QL STRIP: NEGATIVE
PH UR STRIP: 7.5 [PH] (ref 5–9)
PLATELET # BLD AUTO: 214 K/UL (ref 130–400)
POTASSIUM SERPL-SCNC: 3.5 MEQ/L (ref 3.4–4.9)
PROT SERPL-MCNC: 7.4 G/DL (ref 6.3–8)
PROT UR STRIP-MCNC: 100 MG/DL
RBC # BLD AUTO: 4.2 M/UL (ref 4.7–6.1)
RBC #/AREA URNS AUTO: ABNORMAL /HPF (ref 0–5)
SODIUM SERPL-SCNC: 144 MEQ/L (ref 135–144)
SP GR UR STRIP: 1.02 (ref 1–1.03)
TOTAL IRON BINDING CAPACITY: 293 UG/DL (ref 250–450)
TSH SERPL-MCNC: 2.1 UIU/ML (ref 0.44–3.86)
UNSATURATED IRON BINDING CAPACITY: 194 UG/DL (ref 112–347)
UROBILINOGEN UR STRIP-ACNC: 0.2 E.U./DL
WBC # BLD AUTO: 7 K/UL (ref 4.8–10.8)
WBC #/AREA URNS AUTO: ABNORMAL /HPF (ref 0–5)

## 2024-09-16 PROCEDURE — 81001 URINALYSIS AUTO W/SCOPE: CPT

## 2024-09-16 PROCEDURE — 85025 COMPLETE CBC W/AUTO DIFF WBC: CPT

## 2024-09-16 PROCEDURE — 83540 ASSAY OF IRON: CPT

## 2024-09-16 PROCEDURE — 83735 ASSAY OF MAGNESIUM: CPT

## 2024-09-16 PROCEDURE — 80053 COMPREHEN METABOLIC PANEL: CPT

## 2024-09-16 PROCEDURE — 36415 COLL VENOUS BLD VENIPUNCTURE: CPT

## 2024-09-16 PROCEDURE — 83550 IRON BINDING TEST: CPT

## 2024-09-16 PROCEDURE — 84443 ASSAY THYROID STIM HORMONE: CPT

## 2024-09-23 DIAGNOSIS — I48.91 ATRIAL FIBRILLATION, UNSPECIFIED TYPE (HCC): Primary | ICD-10-CM

## 2024-09-25 ENCOUNTER — ANTI-COAG VISIT (OUTPATIENT)
Age: 89
End: 2024-09-25
Payer: MEDICARE

## 2024-09-25 DIAGNOSIS — I48.91 ATRIAL FIBRILLATION, UNSPECIFIED TYPE (HCC): Primary | ICD-10-CM

## 2024-09-25 LAB
INTERNATIONAL NORMALIZATION RATIO, POC: 2.4
PROTHROMBIN TIME, POC: 0

## 2024-09-25 PROCEDURE — 85610 PROTHROMBIN TIME: CPT

## 2024-09-25 PROCEDURE — 99211 OFF/OP EST MAY X REQ PHY/QHP: CPT

## 2024-09-27 ENCOUNTER — APPOINTMENT (OUTPATIENT)
Dept: CARDIOLOGY | Facility: CLINIC | Age: 89
End: 2024-09-27
Payer: MEDICARE

## 2024-09-27 VITALS
SYSTOLIC BLOOD PRESSURE: 112 MMHG | HEART RATE: 75 BPM | BODY MASS INDEX: 27.85 KG/M2 | HEIGHT: 69 IN | WEIGHT: 188 LBS | DIASTOLIC BLOOD PRESSURE: 82 MMHG

## 2024-09-27 DIAGNOSIS — I50.31 ACUTE DIASTOLIC HEART FAILURE: ICD-10-CM

## 2024-09-27 DIAGNOSIS — R73.9 HYPERGLYCEMIA: ICD-10-CM

## 2024-09-27 DIAGNOSIS — I47.10 PSVT (PAROXYSMAL SUPRAVENTRICULAR TACHYCARDIA) (CMS-HCC): ICD-10-CM

## 2024-09-27 DIAGNOSIS — G47.33 OSA ON CPAP: ICD-10-CM

## 2024-09-27 DIAGNOSIS — R55 NEAR SYNCOPE: ICD-10-CM

## 2024-09-27 DIAGNOSIS — I36.1 NONRHEUMATIC TRICUSPID VALVE REGURGITATION: ICD-10-CM

## 2024-09-27 DIAGNOSIS — I27.20 PULMONARY HYPERTENSION (MULTI): ICD-10-CM

## 2024-09-27 DIAGNOSIS — R06.83 SNORING: ICD-10-CM

## 2024-09-27 DIAGNOSIS — R06.09 DOE (DYSPNEA ON EXERTION): ICD-10-CM

## 2024-09-27 DIAGNOSIS — G47.33 OBSTRUCTIVE SLEEP APNEA, ADULT: ICD-10-CM

## 2024-09-27 DIAGNOSIS — I49.5 SSS (SICK SINUS SYNDROME) (MULTI): ICD-10-CM

## 2024-09-27 DIAGNOSIS — R60.0 LOCALIZED EDEMA: ICD-10-CM

## 2024-09-27 DIAGNOSIS — I25.10 CORONARY ARTERY DISEASE INVOLVING NATIVE CORONARY ARTERY OF NATIVE HEART WITHOUT ANGINA PECTORIS: ICD-10-CM

## 2024-09-27 DIAGNOSIS — Z79.899 HIGH RISK MEDICATION USE: ICD-10-CM

## 2024-09-27 DIAGNOSIS — R94.31 ABNORMAL EKG: ICD-10-CM

## 2024-09-27 DIAGNOSIS — E78.2 MIXED HYPERLIPIDEMIA: ICD-10-CM

## 2024-09-27 DIAGNOSIS — I48.0 PAROXYSMAL ATRIAL FIBRILLATION (MULTI): ICD-10-CM

## 2024-09-27 DIAGNOSIS — I10 PRIMARY HYPERTENSION: ICD-10-CM

## 2024-09-27 DIAGNOSIS — R00.1 BRADYCARDIA: ICD-10-CM

## 2024-09-27 DIAGNOSIS — Z79.01 CURRENT USE OF LONG TERM ANTICOAGULATION: ICD-10-CM

## 2024-09-27 PROCEDURE — 1159F MED LIST DOCD IN RCRD: CPT | Performed by: INTERNAL MEDICINE

## 2024-09-27 PROCEDURE — 99214 OFFICE O/P EST MOD 30 MIN: CPT | Performed by: INTERNAL MEDICINE

## 2024-09-27 PROCEDURE — 3079F DIAST BP 80-89 MM HG: CPT | Performed by: INTERNAL MEDICINE

## 2024-09-27 PROCEDURE — 3074F SYST BP LT 130 MM HG: CPT | Performed by: INTERNAL MEDICINE

## 2024-09-27 RX ORDER — POTASSIUM CHLORIDE 20 MEQ/1
20 TABLET, EXTENDED RELEASE ORAL 3 TIMES WEEKLY
Qty: 36 TABLET | Refills: 3 | Status: SHIPPED | OUTPATIENT
Start: 2024-09-27 | End: 2025-09-27

## 2024-09-27 RX ORDER — DIGOXIN 125 MCG
125 TABLET ORAL 3 TIMES WEEKLY
Qty: 36 TABLET | Refills: 3 | Status: SHIPPED | OUTPATIENT
Start: 2024-09-27 | End: 2025-09-27

## 2024-09-27 RX ORDER — LOSARTAN POTASSIUM AND HYDROCHLOROTHIAZIDE 12.5; 5 MG/1; MG/1
1 TABLET ORAL DAILY
Qty: 90 TABLET | Refills: 3 | Status: SHIPPED | OUTPATIENT
Start: 2024-09-27 | End: 2025-09-27

## 2024-09-27 RX ORDER — FUROSEMIDE 20 MG/1
20 TABLET ORAL 3 TIMES WEEKLY
Qty: 36 TABLET | Refills: 3 | Status: SHIPPED | OUTPATIENT
Start: 2024-09-27 | End: 2025-09-27

## 2024-09-27 RX ORDER — AMLODIPINE BESYLATE 10 MG/1
10 TABLET ORAL DAILY
Qty: 90 TABLET | Refills: 3 | Status: SHIPPED | OUTPATIENT
Start: 2024-09-27 | End: 2025-09-27

## 2024-09-27 RX ORDER — PRAVASTATIN SODIUM 40 MG/1
40 TABLET ORAL NIGHTLY
Qty: 90 TABLET | Refills: 3 | Status: SHIPPED | OUTPATIENT
Start: 2024-09-27

## 2024-09-27 NOTE — PROGRESS NOTES
CARDIOLOGY OFFICE NOTE     Date:   9/27/2024    Patient:    Issa Webb    YOB: 1935    Primary Physician: Octaviano Velasquez DO       Reason for Visit: Cardiology follow-up.    HPI:     Issa Webb was seen in cardiac evaluation at the  Cardiology office September 27, 2024.      The patients problems are listed as in the impression below.    Electronic medical records reviewed.    Patient returns.  Feels well overall.  Has chronic Atrial fibrillation rate controlled currently.  Currently is on warfarin and digoxin for atrial fibrillation.  Patient is seeing Dr. Becker electrophysiology., .  May need loop recorder.    Patient denies Chest Pain, SOB, Lightheadedness, Dizziness, TIA or CVA symptoms.  No CHF or Edema.  No Palpitations.  No GI,  or Bleeding Issues. No Recent Fever or Chills.     Cardiovascular and general review of systems is otherwise negative.    A 14-system review is otherwise negative, other than noted.     PHYSICAL EXAMINATION:      Vitals:    09/27/24 0949   BP: 112/82   Pulse: 75     General: No acute distress.  Alert and oriented.  Head And Neck Examination: No jugular venous distention, no carotid bruits, no mass. Carotid upstrokes preserved. Oral mucosa moist.  No xanthelasma. Head and neck examination otherwise unremarkable.  Lungs: Clear to auscultation and percussion. No wheezes, no rales,  and no rhonchi.  Chest: Excursion appeared to be normal. No chest wall tenderness on palpation.  Heart: Normal S1 and S2. No S3. No S4. No rub. Grade 1/6 systolic murmur, best heard at the left sternal border. Point of maximal impulse was within normal limits.  Abdomen: Soft. Nontender. No organomegaly. No bruits. No masses.   Extremities: No bipedal edema. No clubbing. No cyanosis.  Pulses are strong throughout. No bruits.  Musculoskeletal Exam: No ulcers, otherwise unremarkable.  Neuro: Neurologically appeared grossly intact.     IMPRESSION:       Cardiovascular  status stable  Near syncope with seizure-like symptomatology,   Symptomatic bradycardia, rates in the 30s during daytime, improved with decrease of medication.  Shortness of breath, improved  Palpitations, improved  Chronic atrial fibrillation, rate controlled  Sick sinus syndrome with greater than 2 second pauses, Holter monitor 6/2024 with chronic atrial fibrillation but no significant pauses.  Long-term anticoagulation, warfarin  High risk medications, digoxin  Abnormal EKG  Abnormal 48-hour Holter monitor, see below  Congestive heart failure, diastolic, acute on chronic  Coronary artery disease with mild CAD by catheterization 2008, negative CTA past.  Elevated calcium score, 29  Preserved left ventricular systolic function, LVEF 60 to 65%, echocardiogram 1/2024  LV diastolic dysfunction  Premature ventricular contractions  Hypertension  Hyperlipidemia  Hyperglycemia  Pulmonary hypertension  Tricuspid regurgitation  Obstructive sleep apnea on CPAP treatment  Never smoked  Family history of coronary artery disease  Otherwise as per assessment below.    RECOMMENDATIONS:      Patient continues to do well overall.  Would suggest continuing current medications.  Refills were provided.    Exercise dietary program.  Hydration.    Patient will follow-up with Dr. Becker regarding possible loop recorder implant.    Exercise dietary program.    Hydration.    [employer] portal use was encouraged.    We will plan to see back in 6 months with Laboratory Studies and ECG as ordered.     Patient will follow up with their primary physician for general care.    The patient knows to contact medical care earlier if need be.      ALLERGIES:     Allergies   Allergen Reactions    Penicillins Swelling    Sulfa (Sulfonamide Antibiotics) Swelling        MEDICATIONS:     Current Outpatient Medications   Medication Instructions    amLODIPine (NORVASC) 10 mg, oral, Daily    aspirin 81 mg EC tablet 1 tablet, oral, Daily    cholecalciferol (Vitamin  D-3) 125 MCG (5000 UT) capsule 1 capsule, oral, Daily    digoxin (LANOXIN) 125 mcg, oral, 3 times weekly    fluticasone (Flonase) 50 mcg/actuation nasal spray 1 spray, Each Nostril, Daily PRN    furosemide (LASIX) 20 mg, oral, 3 times weekly    losartan-hydrochlorothiazide (Hyzaar) 50-12.5 mg tablet 1 tablet, oral, Daily    magnesium 250 mg tablet 1 tablet, oral, Daily    potassium chloride CR 20 mEq ER tablet 20 mEq, oral, 3 times weekly    pravastatin (PRAVACHOL) 40 mg, oral, Nightly    tamsulosin (Flomax) 0.4 mg 24 hr capsule 1 capsule, oral, Daily    warfarin (COUMADIN) 5 mg, oral       ELECTROCARDIOGRAM:      None this visit    CARDIAC TESTING:      Holter monitor, 6/2024:  Atrial fibrillation throughout.    Average heart rate 78 bpm.    Minimum and maximal heart rate 51 beats minute and 130 beats minute respectively.  Occasional PVCs.    LABORATORY DATA:      None this visit.            PROBLEM LIST:     Patient Active Problem List   Diagnosis    CAD (coronary artery disease)    Dry eye syndrome, bilateral    History of retinal vein occlusion (CMS-HCC)    History of YAG laser capsulotomy of lens of left eye    Hyperlipidemia    Hyperopia with astigmatism and presbyopia    Hypertension    Hypoxia    Impacted cerumen    Lumbago    Obstructive sleep apnea, adult    DANE on CPAP    Overweight (BMI 25.0-29.9)    Paroxysmal atrial fibrillation (Multi)    Permanent atrial fibrillation (Multi)    Posterior capsular opacification visually significant of left eye    Pseudophakia of both eyes    Posterior vitreous detachment of both eyes    PSVT (paroxysmal supraventricular tachycardia) (CMS-HCC)    Pulmonary hypertension (Multi)    RPE mottling of macula    Sensory hearing loss, bilateral    Sinusitis, chronic    Cough    Snoring    Tinnitus    Tricuspid regurgitation    Vertical strabismus of left eye    Acute diastolic heart failure (Multi)    Localized edema    Hyperglycemia    High risk medication use    MAYNARD (dyspnea  on exertion)    Current use of long term anticoagulation    Near syncope    SSS (sick sinus syndrome) (Multi)    Bradycardia    Abnormal EKG             Christopher Ramsey MD, FACC   [facility] /  Cardiology      Of Note:  [employer] voice recognition dictation software was utilized partially in the preparation of this note, therefore, inaccuracies in spelling, word choice and punctuation may have occurred which were not recognized the time of signing.    Patient was seen and examined with total time of visit including chart preparation, rooming, and chart completion exceeding 40 minutes.      ----

## 2024-09-27 NOTE — PATIENT INSTRUCTIONS
Continue same medications and treatments.     Please bring any lab results from other providers / physicians to your next appointment.     Please bring all medicines, vitamins, and herbal supplements with you when you come to the office.     Prescriptions will not be filled unless you are compliant with your follow up appointments or have a follow up appointment scheduled as per instruction of your physician. Refills should be requested at the time of your visit.      DID YOU KNOW:  We have a pharmacy here in the [facility].  They can fill all prescriptions, not just cardiac medications.  Prescriptions from other pharmacies can easily be transferred to the  pharmacy by the  pharmacist on site.   pharmacies offer FREE HOME DELIVERY on medications to anywhere in [place]. They can sync your medications. Typically prescriptions can be ready in 10 - 15 minutes. If pharmacy is unable to fill your  prescription or if cost is more than your paying now the Pharmacist can easily transfer back to your Pharmacy of choice. Pharmacy phone # [phone].     Scribe Attestation  By signing my name below, IEliezer LPN , Sea   attest that this documentation has been prepared under the direction and in the presence of Christopher Ramsey MD.

## 2024-10-09 ENCOUNTER — OFFICE VISIT (OUTPATIENT)
Dept: PULMONOLOGY | Age: 89
End: 2024-10-09
Payer: MEDICARE

## 2024-10-09 VITALS
BODY MASS INDEX: 25.97 KG/M2 | OXYGEN SATURATION: 98 % | HEART RATE: 69 BPM | SYSTOLIC BLOOD PRESSURE: 122 MMHG | DIASTOLIC BLOOD PRESSURE: 70 MMHG | WEIGHT: 181 LBS

## 2024-10-09 DIAGNOSIS — R06.02 SHORTNESS OF BREATH: ICD-10-CM

## 2024-10-09 DIAGNOSIS — G47.33 OSA ON CPAP: Primary | ICD-10-CM

## 2024-10-09 DIAGNOSIS — I48.20 CHRONIC ATRIAL FIBRILLATION (HCC): ICD-10-CM

## 2024-10-09 DIAGNOSIS — E66.3 OVERWEIGHT (BMI 25.0-29.9): ICD-10-CM

## 2024-10-09 PROCEDURE — 99214 OFFICE O/P EST MOD 30 MIN: CPT | Performed by: INTERNAL MEDICINE

## 2024-10-09 PROCEDURE — 1123F ACP DISCUSS/DSCN MKR DOCD: CPT | Performed by: INTERNAL MEDICINE

## 2024-10-09 RX ORDER — LOSARTAN POTASSIUM AND HYDROCHLOROTHIAZIDE 12.5; 5 MG/1; MG/1
1 TABLET ORAL DAILY
COMMUNITY
Start: 2024-01-09 | End: 2025-09-27

## 2024-10-09 RX ORDER — AMLODIPINE BESYLATE 10 MG/1
10 TABLET ORAL DAILY
COMMUNITY
Start: 2024-09-19

## 2024-10-09 ASSESSMENT — ENCOUNTER SYMPTOMS
SORE THROAT: 0
NAUSEA: 0
CHEST TIGHTNESS: 0
WHEEZING: 0
SHORTNESS OF BREATH: 0
EYE ITCHING: 0
ABDOMINAL PAIN: 0
COUGH: 0
VOMITING: 0
DIARRHEA: 0
RHINORRHEA: 0
VOICE CHANGE: 0

## 2024-10-09 NOTE — PROGRESS NOTES
size. No  pneumothorax. No free air beneath the hemidiaphragms.  Incidental calcified  granuloma is present on the left measuring up to 11 mm.    Impression  No pneumonia or pleural effusion.      Results for orders placed during the hospital encounter of 01/02/24    XR CHEST PORTABLE    Narrative  EXAMINATION:  ONE XRAY VIEW OF THE CHEST    1/2/2024 10:55 am    COMPARISON:  None.    HISTORY:  ORDERING SYSTEM PROVIDED HISTORY: sob  TECHNOLOGIST PROVIDED HISTORY:  Reason for exam:->sob  What reading provider will be dictating this exam?->CRC    FINDINGS:  The heart is enlarged.  Pulmonary vessels are prominent.  No airspace  consolidation.  No pneumothorax or pleural effusion.  Calcified granuloma  projected in the left mid lower lung over the cardiac shadow.    Impression  Cardiomegaly with mild pulmonary vascular congestion.  ]  No results found for this or any previous visit.  ]  No results found for this or any previous visit.  ]  No results found for this or any previous visit.  ]    Assessment/Plan:     1. OSVALDO on CPAP  He is using CPAP for about 8 hours every night. CPAP with 7-10 cm.  He is using CPAP with  nasal  Mask. patient has large air leak   C/o nasal congestion and postnasal dripHe said  sleep is restful with the CPAP use.He is compliant with CPAP therapy and benefiting with CPAP use.No snoring with CPAP use.     I reviewed compliance report with patient regarding CPAP therapy. He is using  CPAP for 30 days out of 30 days  Average usage of days used is 7 hours and 6 min , average AHI 11.2 with CPAP use.      Counseling: CPAP/BiPAP uses, He advised to use CPAP at least 5-6 hours every night.    Driving: He is advised for extreme caution when driving or operating machinery if there is a feeling of drowsiness, especially while driving it is preferable to stop driving and take a brief nap.  Sleep hygiene:Avoid supine sleep, sleep on  sides. Avoid  sleep deprivation.  Explained sleep hygiene.  Advice to

## 2024-11-06 ENCOUNTER — ANTI-COAG VISIT (OUTPATIENT)
Age: 89
End: 2024-11-06
Payer: MEDICARE

## 2024-11-06 DIAGNOSIS — I48.91 ATRIAL FIBRILLATION, UNSPECIFIED TYPE (HCC): Primary | ICD-10-CM

## 2024-11-06 LAB
INTERNATIONAL NORMALIZATION RATIO, POC: 2.4
PROTHROMBIN TIME, POC: 0

## 2024-11-06 PROCEDURE — 85610 PROTHROMBIN TIME: CPT

## 2024-11-06 PROCEDURE — 99211 OFF/OP EST MAY X REQ PHY/QHP: CPT

## 2024-11-06 NOTE — PROGRESS NOTES
Mr. Alex Carlson is a 89 y.o. y/o male with history of Afib who presents today for anticoagulation monitoring and adjustment.  INR 2.4 is therapeutic for this patient (goal range 2-3) and is reflective of 40 mg TWD  Patient verifies current dosing regimen, patient able to verbally recall dose  Patient reports 0 missed doses since last INR   Patient denies s/sx clotting and/or stroke  Patient denies hematuria, epistaxis, rectal bleeding  Patient denies changes in diet, alcohol, or tobacco use  Reviewed medication list and drug allergies with patient, updated any medication additions or modifications accordingly  Patient also denies any pending medical or dental procedures scheduled at this time  Patient was instructed to continue 40 mg TWD and RTC 6 weeks    For Pharmacy Admin Tracking Only    Intervention Detail: Adherence Monitorin  Total # of Interventions Recommended: 1  Total # of Interventions Accepted: 1  Time Spent (min): 15

## 2024-11-25 ENCOUNTER — HOSPITAL ENCOUNTER (EMERGENCY)
Age: 89
Discharge: HOME | End: 2024-11-25
Attending: EMERGENCY MEDICINE
Payer: MEDICARE

## 2024-11-25 ENCOUNTER — APPOINTMENT (OUTPATIENT)
Dept: GENERAL RADIOLOGY | Age: 89
End: 2024-11-25
Payer: MEDICARE

## 2024-11-25 VITALS
BODY MASS INDEX: 27.2 KG/M2 | HEART RATE: 88 BPM | HEIGHT: 70 IN | TEMPERATURE: 98.7 F | OXYGEN SATURATION: 98 % | DIASTOLIC BLOOD PRESSURE: 73 MMHG | RESPIRATION RATE: 19 BRPM | SYSTOLIC BLOOD PRESSURE: 145 MMHG | WEIGHT: 190 LBS
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
## 2024-11-25 DIAGNOSIS — S61.452A: Primary | ICD-10-CM

## 2024-11-25 PROCEDURE — 90471 IMMUNIZATION ADMIN: CPT | Performed by: EMERGENCY MEDICINE

## 2024-11-25 PROCEDURE — 90715 TDAP VACCINE 7 YRS/> IM: CPT | Performed by: EMERGENCY MEDICINE

## 2024-11-25 PROCEDURE — 6360000002 HC RX W HCPCS: Performed by: EMERGENCY MEDICINE

## 2024-11-25 PROCEDURE — 99284 EMERGENCY DEPT VISIT MOD MDM: CPT

## 2024-11-25 PROCEDURE — 6370000000 HC RX 637 (ALT 250 FOR IP): Performed by: EMERGENCY MEDICINE

## 2024-11-25 PROCEDURE — 96372 THER/PROPH/DIAG INJ SC/IM: CPT

## 2024-11-25 PROCEDURE — 73130 X-RAY EXAM OF HAND: CPT

## 2024-11-25 RX ORDER — CIPROFLOXACIN 500 MG/1
500 TABLET, FILM COATED ORAL 2 TIMES DAILY
Qty: 20 TABLET | Refills: 0 | Status: SHIPPED | OUTPATIENT
Start: 2024-11-25 | End: 2024-12-05

## 2024-11-25 RX ORDER — BACITRACIN ZINC AND POLYMYXIN B SULFATE 500; 1000 [USP'U]/G; [USP'U]/G
OINTMENT TOPICAL
Qty: 28.4 G | Refills: 1 | Status: SHIPPED | OUTPATIENT
Start: 2024-11-25 | End: 2024-12-02

## 2024-11-25 RX ORDER — CEFTRIAXONE 1 G/1
1000 INJECTION, POWDER, FOR SOLUTION INTRAMUSCULAR; INTRAVENOUS ONCE
Status: COMPLETED | OUTPATIENT
Start: 2024-11-25 | End: 2024-11-25

## 2024-11-25 RX ORDER — ACETAMINOPHEN 500 MG
1000 TABLET ORAL
Status: COMPLETED | OUTPATIENT
Start: 2024-11-25 | End: 2024-11-25

## 2024-11-25 RX ORDER — CLINDAMYCIN HYDROCHLORIDE 300 MG/1
300 CAPSULE ORAL 4 TIMES DAILY
Qty: 40 CAPSULE | Refills: 0 | Status: SHIPPED | OUTPATIENT
Start: 2024-11-25 | End: 2024-12-05

## 2024-11-25 RX ORDER — CHLORHEXIDINE GLUCONATE 40 MG/ML
SOLUTION TOPICAL DAILY PRN
Qty: 236 ML | Refills: 0 | Status: SHIPPED | OUTPATIENT
Start: 2024-11-25

## 2024-11-25 RX ADMIN — TETANUS TOXOID, REDUCED DIPHTHERIA TOXOID AND ACELLULAR PERTUSSIS VACCINE, ADSORBED 0.5 ML: 5; 2.5; 8; 8; 2.5 SUSPENSION INTRAMUSCULAR at 21:41

## 2024-11-25 RX ADMIN — CEFTRIAXONE 1000 MG: 1 INJECTION, POWDER, FOR SOLUTION INTRAMUSCULAR; INTRAVENOUS at 21:43

## 2024-11-25 RX ADMIN — ACETAMINOPHEN 1000 MG: 500 TABLET ORAL at 21:40

## 2024-11-25 ASSESSMENT — ENCOUNTER SYMPTOMS
DIARRHEA: 0
ABDOMINAL PAIN: 0
WHEEZING: 0
SHORTNESS OF BREATH: 0
COLOR CHANGE: 0
BACK PAIN: 0
EYE PAIN: 0
ABDOMINAL DISTENTION: 0
RHINORRHEA: 0
VOMITING: 0
APNEA: 0
PHOTOPHOBIA: 0
NAUSEA: 0
CONSTIPATION: 0
COUGH: 0
SORE THROAT: 0
SINUS PRESSURE: 0

## 2024-11-25 ASSESSMENT — LIFESTYLE VARIABLES
HOW OFTEN DO YOU HAVE A DRINK CONTAINING ALCOHOL: NEVER
HOW MANY STANDARD DRINKS CONTAINING ALCOHOL DO YOU HAVE ON A TYPICAL DAY: PATIENT DOES NOT DRINK

## 2024-11-25 ASSESSMENT — PAIN - FUNCTIONAL ASSESSMENT: PAIN_FUNCTIONAL_ASSESSMENT: 0-10

## 2024-11-25 ASSESSMENT — PAIN DESCRIPTION - PAIN TYPE: TYPE: ACUTE PAIN

## 2024-11-25 ASSESSMENT — PAIN DESCRIPTION - LOCATION
LOCATION: HAND
LOCATION: HAND

## 2024-11-25 ASSESSMENT — PAIN DESCRIPTION - ORIENTATION
ORIENTATION: LEFT
ORIENTATION: LEFT

## 2024-11-25 ASSESSMENT — PAIN DESCRIPTION - FREQUENCY: FREQUENCY: CONTINUOUS

## 2024-11-25 ASSESSMENT — PAIN SCALES - GENERAL
PAINLEVEL_OUTOF10: 6
PAINLEVEL_OUTOF10: 5
PAINLEVEL_OUTOF10: 6

## 2024-11-26 NOTE — ED PROVIDER NOTES
to cat bite          DISPOSITION/PLAN   DISPOSITION Decision To Discharge 11/25/2024 10:14:15 PM           PATIENT REFERRED TO:  William Simon DO  [facility]  [address], [address]  [place] [address]  [phone]    In 3 days        DISCHARGE MEDICATIONS:  Discharge Medication List as of 11/25/2024 10:16 PM        START taking these medications    Details   clindamycin (CLEOCIN) 300 MG capsule Take 1 capsule by mouth 4 times daily for 10 days, Disp-40 capsule, R-0Normal      ciprofloxacin (CIPRO) 500 MG tablet Take 1 tablet by mouth 2 times daily for 10 days, Disp-20 tablet, R-0Normal      bacitracin-polymyxin b (POLYSPORIN) [id number] UNIT/GM ointment Apply topically 2 times daily., Disp-28.4 g, R-1, Normal      chlorhexidine gluconate (HIBICLENS) 4 % SOLN external solution Apply topically daily as needed (Left hand wound), Topical, DAILY PRN Starting Mon 11/25/2024, Disp-236 mL, R-0, Normal                (Please note that portions of this note were completed with a voice recognitionprogram.  Efforts were made to edit the dictations but occasionally words are mis-transcribed.)    Robert Fierro MD (electronically signed)  Attending Emergency Physician          Robert Fierro MD  11/25/24 2216       Robert Fierro MD  11/26/24 2023

## 2024-11-26 NOTE — ED TRIAGE NOTES
Patient states approx 2 days ago he got bit by his cat on left hand. Patient's left hand appears red, and swollen. Patient states pain is 6/10. States all of the cats shots are up to date.

## 2024-12-09 ENCOUNTER — APPOINTMENT (OUTPATIENT)
Dept: CARDIOLOGY | Facility: CLINIC | Age: 89
End: 2024-12-09
Payer: MEDICARE

## 2024-12-09 VITALS
DIASTOLIC BLOOD PRESSURE: 74 MMHG | SYSTOLIC BLOOD PRESSURE: 108 MMHG | WEIGHT: 190.2 LBS | BODY MASS INDEX: 28.17 KG/M2 | HEART RATE: 79 BPM | HEIGHT: 69 IN

## 2024-12-09 DIAGNOSIS — Z78.9 NEVER SMOKED TOBACCO: ICD-10-CM

## 2024-12-09 DIAGNOSIS — Z51.81 ANTICOAGULATION MANAGEMENT ENCOUNTER: ICD-10-CM

## 2024-12-09 DIAGNOSIS — Z79.899 HIGH RISK MEDICATION USE: ICD-10-CM

## 2024-12-09 DIAGNOSIS — I48.19 PERSISTENT ATRIAL FIBRILLATION (MULTI): ICD-10-CM

## 2024-12-09 DIAGNOSIS — I25.10 CORONARY ARTERY DISEASE INVOLVING NATIVE CORONARY ARTERY OF NATIVE HEART WITHOUT ANGINA PECTORIS: ICD-10-CM

## 2024-12-09 DIAGNOSIS — Z79.01 ANTICOAGULATION MANAGEMENT ENCOUNTER: ICD-10-CM

## 2024-12-09 PROCEDURE — 3074F SYST BP LT 130 MM HG: CPT | Performed by: INTERNAL MEDICINE

## 2024-12-09 PROCEDURE — 3078F DIAST BP <80 MM HG: CPT | Performed by: INTERNAL MEDICINE

## 2024-12-09 PROCEDURE — 1159F MED LIST DOCD IN RCRD: CPT | Performed by: INTERNAL MEDICINE

## 2024-12-09 PROCEDURE — 1036F TOBACCO NON-USER: CPT | Performed by: INTERNAL MEDICINE

## 2024-12-09 PROCEDURE — 99214 OFFICE O/P EST MOD 30 MIN: CPT | Performed by: INTERNAL MEDICINE

## 2024-12-09 RX ORDER — METOPROLOL SUCCINATE 25 MG/1
25 TABLET, EXTENDED RELEASE ORAL DAILY
COMMUNITY
End: 2024-12-09 | Stop reason: WASHOUT

## 2024-12-09 NOTE — PROGRESS NOTES
CARDIOLOGY OFFICE VISIT      CHIEF COMPLAINT  Chief Complaint   Patient presents with    Follow-up     6 month        HISTORY OF PRESENT ILLNESS  HPI  89-year-old male with a past medical history of near syncope, seizure disorders, chronic atrial fibrillation on rate control strategy, sinus node dysfunction. Long-term anticoagulant therapy with warfarin. History of congestive heart failure diastolic. Coronary artery disease with mild coronary artery disease by cardiac catheterization in 2008 and negative CTA in the past. Left ventricular ejection fraction in January 2024 with a value of 60 to 65%. Hypertension, hyperlipidemia.       Holter monitor 06/2024     This is a Holter monitor for 48 hours.     The underlying rhythm was atrial fibrillation at a rate of 78 bpm.  Atrial fibrillation was the main rhythm during the study.  The rates are very well-controlled during atrial fibrillation.     The minimum heart rate was 51 bpm, the maximal heart rate was 130 beats minute, with an average heart rate of 59 bpm.     There were occasional sedated PVCs but no evidence sustained ventricular arrhythmias.     No significant pauses or evidence of high-grade AV block were seen during the study.  The longest R-R interval was 2.8 seconds at 2:09 PM.     Patient did not report symptoms during this study.     Conclusion     Underlying rhythm of atrial fibrillation.     Rate control during atrial fibrillation.  Clinical correlation is to be made patient continues having tremors occasionally.    Since the last office visit, he has been doing well.  He denies any symptoms of chest pain or shortness of breath or palpitations.    Patient has been off metoprolol since May 2024 due to episodes of bradycardia.          Past Medical History  History reviewed. No pertinent past medical history.    Social History  Social History     Tobacco Use    Smoking status: Never    Smokeless tobacco: Never   Substance Use Topics    Alcohol use: Never     Drug use: Never       Family History   No family history on file.     Allergies:  Allergies   Allergen Reactions    Penicillins Swelling    Sulfa (Sulfonamide Antibiotics) Swelling        Outpatient Medications:  Current Outpatient Medications   Medication Instructions    amLODIPine (NORVASC) 10 mg, oral, Daily    aspirin 81 mg EC tablet 1 tablet, Daily    cholecalciferol (Vitamin D-3) 125 MCG (5000 UT) capsule 1 capsule, Daily    digoxin (LANOXIN) 125 mcg, oral, 3 times weekly    fluticasone (Flonase) 50 mcg/actuation nasal spray 1 spray, Daily PRN    furosemide (LASIX) 20 mg, oral, 3 times weekly    losartan-hydrochlorothiazide (Hyzaar) 50-12.5 mg tablet 1 tablet, oral, Daily    magnesium 250 mg tablet 1 tablet, Daily    metoprolol succinate XL (TOPROL-XL) 25 mg, Daily    potassium chloride CR 20 mEq ER tablet 20 mEq, oral, 3 times weekly    pravastatin (PRAVACHOL) 40 mg, oral, Nightly    tamsulosin (Flomax) 0.4 mg 24 hr capsule 1 capsule, Daily    warfarin (COUMADIN) 5 mg          REVIEW OF SYSTEMS  Review of Systems   All other systems reviewed and are negative.        VITALS  Vitals:    12/09/24 0851   BP: 108/74   Pulse: 79       PHYSICAL EXAM  Constitutional:       Appearance: Healthy appearance. Not in distress.   Neck:      Vascular: No JVR. JVD normal.   Pulmonary:      Effort: Pulmonary effort is normal.      Breath sounds: Normal breath sounds. No wheezing. No rhonchi. No rales.   Chest:      Chest wall: Not tender to palpatation.   Cardiovascular:      PMI at left midclavicular line. Normal rate. Irregularly irregular rhythm. Normal S1. Normal S2.       Murmurs: There is no murmur.      No gallop.  No click. No rub.   Pulses:     Intact distal pulses.   Edema:     Peripheral edema absent.   Abdominal:      General: Bowel sounds are normal.      Palpations: Abdomen is soft.      Tenderness: There is no abdominal tenderness.   Musculoskeletal: Normal range of motion.         General: No tenderness.  Skin:     General: Skin is warm and dry.   Neurological:      General: No focal deficit present.      Mental Status: Alert and oriented to person, place and time.           ASSESSMENT AND PLAN  Clinical impression     1.  Tremors  2.  Atrial fibrillation persistent-permanent on rate control strategy  3.  Long-term anticoagulant therapy with warfarin  4.  Seizure disorder  5.  Normal left ventricular function per echocardiogram as described above  6.  Mild coronary artery disease per catheterization in the past      Plan recommendations    Patient is doing well from the electrophysiology standpoint.  Will continue rate control strategy for atrial fibrillation.    Follow my office every 6 months to a year or sooner if needed.    Will continue off AV heber blocking agents.    Continue with warfarin.    Patient understood that in case he started having symptoms of near syncope or syncope he needs to be reevaluated for possible pacemaker implantation.    Risk factor modification and lifestyle modification discussed with patient. Diet , exercise and hydration discussed with patient.  I have personally review with patient during this office visit, laboratory data, echocardiogram results, stress test results, Holter-event monitor results prior and after the last electrophysiology visit. All questions has been answered.    Please excuse any errors in grammar or translation related to this dictation.  Voice recognition software was utilized to prepare this document.        Scribe Attestation  By signing my name below, I, Martha Morrison MA  , Scribe   attest that this documentation has been prepared under the direction and in the presence of Saurav Becker MD.

## 2024-12-18 ENCOUNTER — ANTI-COAG VISIT (OUTPATIENT)
Age: 89
End: 2024-12-18
Payer: MEDICARE

## 2024-12-18 DIAGNOSIS — I48.91 ATRIAL FIBRILLATION, UNSPECIFIED TYPE (HCC): Primary | ICD-10-CM

## 2024-12-18 LAB
INTERNATIONAL NORMALIZATION RATIO, POC: 2.5
PROTHROMBIN TIME, POC: 0

## 2024-12-18 PROCEDURE — 99211 OFF/OP EST MAY X REQ PHY/QHP: CPT

## 2024-12-18 PROCEDURE — 85610 PROTHROMBIN TIME: CPT

## 2024-12-18 NOTE — PROGRESS NOTES
Mr. Alex Carlson is a 89 y.o. y/o male with history of Afib who presents today for anticoagulation monitoring and adjustment.  INR 2.5 is therapeutic for this patient (goal range 2-3) and is reflective of 40 mg TWD  Patient verifies current dosing regimen, patient able to verbally recall dose  Patient reports 0 missed doses since last INR   Patient denies s/sx clotting and/or stroke  Patient denies hematuria, epistaxis, rectal bleeding  Patient denies changes in diet, alcohol, or tobacco use  Reviewed medication list and drug allergies with patient, updated any medication additions or modifications accordingly  Patient also denies any pending medical or dental procedures scheduled at this time  Patient was instructed to continue 40 mg TWD and RTC 6 weeks      For Pharmacy Admin Tracking Only    Intervention Detail: Adherence Monitorin  Total # of Interventions Recommended: 1  Total # of Interventions Accepted: 1  Time Spent (min): 15

## 2025-01-29 ENCOUNTER — ANTI-COAG VISIT (OUTPATIENT)
Age: 89
End: 2025-01-29
Payer: MEDICARE

## 2025-01-29 DIAGNOSIS — I48.0 PAROXYSMAL ATRIAL FIBRILLATION (HCC): Primary | ICD-10-CM

## 2025-01-29 LAB
INTERNATIONAL NORMALIZATION RATIO, POC: 1.6
PROTHROMBIN TIME, POC: 0

## 2025-01-29 PROCEDURE — 99212 OFFICE O/P EST SF 10 MIN: CPT

## 2025-01-29 PROCEDURE — 99211 OFF/OP EST MAY X REQ PHY/QHP: CPT

## 2025-01-29 PROCEDURE — 85610 PROTHROMBIN TIME: CPT

## 2025-01-29 RX ORDER — WARFARIN SODIUM 5 MG/1
TABLET ORAL
Qty: 120 TABLET | Refills: 1 | Status: SHIPPED | OUTPATIENT
Start: 2025-01-29

## 2025-01-29 NOTE — PROGRESS NOTES
Mr. Alex Carlson is a 89 y.o. y/o male with history of Afib who presents today with his wife for anticoagulation monitoring and adjustment.  INR 1.6 is sub therapeutic for this patient (goal range 2-3) and is reflective of 40 mg TWD  Patient verifies current dosing regimen, patient able to verbally recall dose  Patient reports 0  missed doses since last INR   Patient denies s/sx clotting and/or stroke  Patient denies hematuria, epistaxis, rectal bleeding  Patient denies changes in diet, alcohol, or tobacco use  Reviewed medication list and drug allergies with patient, updated any medication additions or modifications accordingly  Patient also denies any pending medical or dental procedures scheduled at this time  Patient was instructed to boost today to 10 mg and tomorrow to 7.5 mg (usual 5 mg), then resume 40 mg TWD and RTC 2 weeks  For Pharmacy Admin Tracking Only    Intervention Detail: Dose Adjustment: 1, reason: Therapy Optimization  Total # of Interventions Recommended: 2  Total # of Interventions Accepted: 2  Time Spent (min): 15

## 2025-02-12 ENCOUNTER — ANTI-COAG VISIT (OUTPATIENT)
Age: 89
End: 2025-02-12
Payer: MEDICARE

## 2025-02-12 DIAGNOSIS — I48.0 PAROXYSMAL ATRIAL FIBRILLATION (HCC): Primary | ICD-10-CM

## 2025-02-12 LAB
INTERNATIONAL NORMALIZATION RATIO, POC: 2.3
PROTHROMBIN TIME, POC: 0

## 2025-02-12 PROCEDURE — 85610 PROTHROMBIN TIME: CPT

## 2025-02-12 PROCEDURE — 99211 OFF/OP EST MAY X REQ PHY/QHP: CPT

## 2025-02-12 NOTE — PROGRESS NOTES
Mr. Alex Carlson is a 89 y.o. y/o male with history of Afib who presents today for anticoagulation monitoring and adjustment.  INR 2.3 is therapeutic for this patient (goal range 2-3) and is reflective of 40 mg TWD  Patient verifies current dosing regimen, patient able to verbally recall dose  Patient reports 0  missed doses since last INR   Patient denies s/sx clotting and/or stroke  Patient denies hematuria, epistaxis, rectal bleeding  Patient denies changes in diet, alcohol, or tobacco use  Reviewed medication list and drug allergies with patient, updated any medication additions or modifications accordingly  Patient also denies any pending medical or dental procedures scheduled at this time  Patient was instructed to continue 40 mg TWD and RTC 4 weeks (usual 6 weeks)  For Pharmacy Admin Tracking Only    Intervention Detail: Adherence Monitorin  Total # of Interventions Recommended: 1  Total # of Interventions Accepted: 1  Time Spent (min): 15

## 2025-02-26 ENCOUNTER — OFFICE VISIT (OUTPATIENT)
Dept: PULMONOLOGY | Age: 89
End: 2025-02-26
Payer: MEDICARE

## 2025-02-26 VITALS
DIASTOLIC BLOOD PRESSURE: 82 MMHG | OXYGEN SATURATION: 98 % | BODY MASS INDEX: 27.26 KG/M2 | WEIGHT: 190 LBS | SYSTOLIC BLOOD PRESSURE: 132 MMHG | HEART RATE: 92 BPM

## 2025-02-26 DIAGNOSIS — G47.33 OSA ON CPAP: Primary | ICD-10-CM

## 2025-02-26 DIAGNOSIS — I48.20 CHRONIC ATRIAL FIBRILLATION (HCC): ICD-10-CM

## 2025-02-26 PROCEDURE — 99214 OFFICE O/P EST MOD 30 MIN: CPT | Performed by: INTERNAL MEDICINE

## 2025-02-26 PROCEDURE — 1123F ACP DISCUSS/DSCN MKR DOCD: CPT | Performed by: INTERNAL MEDICINE

## 2025-02-26 PROCEDURE — 1159F MED LIST DOCD IN RCRD: CPT | Performed by: INTERNAL MEDICINE

## 2025-02-26 ASSESSMENT — ENCOUNTER SYMPTOMS
EYE ITCHING: 0
WHEEZING: 0
SHORTNESS OF BREATH: 0
NAUSEA: 0
RHINORRHEA: 0
DIARRHEA: 0
SORE THROAT: 0
VOMITING: 0
VOICE CHANGE: 0
COUGH: 0
CHEST TIGHTNESS: 0
ABDOMINAL PAIN: 0

## 2025-02-26 NOTE — PROGRESS NOTES
Subjective:             Alex Carlson is a 89 y.o. male who complains today of:     Chief Complaint   Patient presents with    Follow-up     4m f/u on OSVALDO. C/o feeling shaky and weak, trouble with memory       HPI  He is using CPAP for about 2-4 hours every night. CPAP with 7-10 cm.  He is using CPAP with  nasal  Mask. patient has large air leak   C/o nasal congestion and postnasal drip. He said  sleep is restful with the CPAP use.  He is compliant with CPAP therapy and benefiting with CPAP use. No snoring with CPAP use.  No complaint of daytime sleepiness or tiredness with CPAP use. He denies taking naps.  No sleepiness with driving. He denies difficulty falling asleep or staying asleep.     I reviewed compliance report with patient regarding CPAP therapy. He is using  CPAP for 29days out of 30 days  Average usage of days used is  hours and 6 min , average AHI 11.2 with CPAP use.      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    Tinnitus 06/28/2010     Past Surgical History:   Procedure Laterality Date    CATARACT REMOVAL  2011     Family History   Problem Relation Age of Onset    Heart Disease Father      Social History     Socioeconomic History    Marital status:      Spouse name: Not on file    Number of children: Not on file    Years of education: Not on file    Highest education level: Not on file   Occupational History    Not on file   Tobacco Use    Smoking status: Never    Smokeless tobacco: Never   Vaping Use    Vaping status: Never Used   Substance and Sexual Activity    Alcohol use: Not on file    Drug use: Not on file    Sexual activity: Not on file   Other Topics Concern    Not on file   Social History Narrative    Not on file     Social Determinants of Health     Financial Resource Strain: Low Risk  (3/7/2023)    Overall

## 2025-03-12 ENCOUNTER — ANTI-COAG VISIT (OUTPATIENT)
Age: 89
End: 2025-03-12
Payer: MEDICARE

## 2025-03-12 DIAGNOSIS — I48.0 PAROXYSMAL ATRIAL FIBRILLATION (HCC): Primary | ICD-10-CM

## 2025-03-12 LAB
INTERNATIONAL NORMALIZATION RATIO, POC: 3.3
PROTHROMBIN TIME, POC: 0

## 2025-03-12 PROCEDURE — 99212 OFFICE O/P EST SF 10 MIN: CPT

## 2025-03-12 PROCEDURE — 85610 PROTHROMBIN TIME: CPT

## 2025-03-12 NOTE — PROGRESS NOTES
Mr. Alex Carlson is a 89 y.o. y/o male with history of Afib who presents today for anticoagulation monitoring and adjustment.  INR 3.3 is supra therapeutic for this patient (goal range 2-3) and is reflective of 40 mg TWD  Patient verifies current dosing regimen, patient able to verbally recall dose  Patient reports possibly 1  missed doses since last INR (patient said forgot meds one day, but may have been morning and takes warfarin in evening)  Patient denies s/sx clotting and/or stroke  Patient denies hematuria, epistaxis, rectal bleeding  Patient denies changes in diet, alcohol, or tobacco use  Reviewed medication list and drug allergies with patient, updated any medication additions or modifications accordingly  Patient also denies any pending medical or dental procedures scheduled at this time  Patient was instructed to hold warfarin today, then resume 40 mg TWD and RTC 3 weeks (usual 6 weeks)  For Pharmacy Admin Tracking Only    Intervention Detail: Dose Adjustment: 1, reason: Therapy Optimization  Total # of Interventions Recommended: 1  Total # of Interventions Accepted: 1  Time Spent (min): 15

## 2025-03-17 ENCOUNTER — HOSPITAL ENCOUNTER (OUTPATIENT)
Dept: LAB | Age: 89
Discharge: HOME | End: 2025-03-17
Payer: MEDICARE

## 2025-03-17 LAB
ALBUMIN SERPL-MCNC: 4.2 G/DL (ref 3.5–4.6)
ALP SERPL-CCNC: 87 U/L (ref 35–104)
ALT SERPL-CCNC: 12 U/L (ref 0–41)
ANION GAP SERPL CALCULATED.3IONS-SCNC: 13 MEQ/L (ref 9–15)
AST SERPL-CCNC: 20 U/L (ref 0–40)
BACTERIA URNS QL MICRO: NEGATIVE /HPF
BASOPHILS # BLD: 0 K/UL (ref 0–0.2)
BASOPHILS NFR BLD: 0.6 %
BILIRUB SERPL-MCNC: 0.6 MG/DL (ref 0.2–0.7)
BILIRUB UR QL STRIP: NEGATIVE
BUN SERPL-MCNC: 16 MG/DL (ref 8–23)
CALCIUM SERPL-MCNC: 9.3 MG/DL (ref 8.5–9.9)
CHLORIDE SERPL-SCNC: 104 MEQ/L (ref 95–107)
CLARITY UR: CLEAR
CO2 SERPL-SCNC: 25 MEQ/L (ref 20–31)
COLOR UR: ABNORMAL
CREAT SERPL-MCNC: 0.95 MG/DL (ref 0.7–1.2)
EOSINOPHIL # BLD: 0.1 K/UL (ref 0–0.7)
EOSINOPHIL NFR BLD: 1.5 %
EPI CELLS #/AREA URNS AUTO: ABNORMAL /HPF (ref 0–5)
ERYTHROCYTE [DISTWIDTH] IN BLOOD BY AUTOMATED COUNT: 12.9 % (ref 11.5–14.5)
GLOBULIN SER CALC-MCNC: 3.2 G/DL (ref 2.3–3.5)
GLUCOSE SERPL-MCNC: 116 MG/DL (ref 70–99)
GLUCOSE UR STRIP-MCNC: NEGATIVE MG/DL
HCT VFR BLD AUTO: 42.7 % (ref 42–52)
HGB BLD-MCNC: 15.5 G/DL (ref 14–18)
HGB UR QL STRIP: NEGATIVE
HYALINE CASTS #/AREA URNS AUTO: ABNORMAL /HPF (ref 0–5)
IRON % SATURATION: 38 % (ref 20–55)
IRON: 104 UG/DL (ref 61–157)
KETONES UR STRIP-MCNC: ABNORMAL MG/DL
LEUKOCYTE ESTERASE UR QL STRIP: ABNORMAL
LYMPHOCYTES # BLD: 2.1 K/UL (ref 1–4.8)
LYMPHOCYTES NFR BLD: 31.5 %
MCH RBC QN AUTO: 36.1 PG (ref 27–31.3)
MCHC RBC AUTO-ENTMCNC: 36.3 % (ref 33–37)
MCV RBC AUTO: 99.5 FL (ref 79–92.2)
MONOCYTES # BLD: 0.7 K/UL (ref 0.2–0.8)
MONOCYTES NFR BLD: 11 %
NEUTROPHILS # BLD: 3.6 K/UL (ref 1.4–6.5)
NEUTS SEG NFR BLD: 54.6 %
NITRITE UR QL STRIP: NEGATIVE
PH UR STRIP: 6 [PH] (ref 5–9)
PLATELET # BLD AUTO: 230 K/UL (ref 130–400)
POTASSIUM SERPL-SCNC: 3.7 MEQ/L (ref 3.4–4.9)
PROSTATE SPECIFIC ANTIGEN: 2.65 NG/ML (ref 0–4)
PROT SERPL-MCNC: 7.4 G/DL (ref 6.3–8)
PROT UR STRIP-MCNC: 100 MG/DL
PSA FREE MFR SERPL: 11.3 %
PSA FREE SERPL-MCNC: 0.3 UG/L
RBC # BLD AUTO: 4.29 M/UL (ref 4.7–6.1)
RBC #/AREA URNS AUTO: ABNORMAL /HPF (ref 0–5)
SODIUM SERPL-SCNC: 142 MEQ/L (ref 135–144)
SP GR UR STRIP: 1.03 (ref 1–1.03)
TOTAL IRON BINDING CAPACITY: 277 UG/DL (ref 250–450)
TSH SERPL-MCNC: 2.41 UIU/ML (ref 0.44–3.86)
UNSATURATED IRON BINDING CAPACITY: 173 UG/DL (ref 112–347)
UROBILINOGEN UR STRIP-ACNC: 1 E.U./DL
WBC # BLD AUTO: 6.6 K/UL (ref 4.8–10.8)
WBC #/AREA URNS AUTO: ABNORMAL /HPF (ref 0–5)

## 2025-03-17 PROCEDURE — 84443 ASSAY THYROID STIM HORMONE: CPT

## 2025-03-17 PROCEDURE — 83550 IRON BINDING TEST: CPT

## 2025-03-17 PROCEDURE — 81001 URINALYSIS AUTO W/SCOPE: CPT

## 2025-03-17 PROCEDURE — 83540 ASSAY OF IRON: CPT

## 2025-03-17 PROCEDURE — 85025 COMPLETE CBC W/AUTO DIFF WBC: CPT

## 2025-03-17 PROCEDURE — 84154 ASSAY OF PSA FREE: CPT

## 2025-03-17 PROCEDURE — 80053 COMPREHEN METABOLIC PANEL: CPT

## 2025-03-17 PROCEDURE — 36415 COLL VENOUS BLD VENIPUNCTURE: CPT

## 2025-03-25 ENCOUNTER — TRANSCRIBE ORDERS (OUTPATIENT)
Dept: ADMINISTRATIVE | Age: 89
End: 2025-03-25

## 2025-03-25 DIAGNOSIS — F03.90 DEMENTIA, UNSPECIFIED DEMENTIA SEVERITY, UNSPECIFIED DEMENTIA TYPE, UNSPECIFIED WHETHER BEHAVIORAL, PSYCHOTIC, OR MOOD DISTURBANCE OR ANXIETY (HCC): Primary | ICD-10-CM

## 2025-03-28 ENCOUNTER — APPOINTMENT (OUTPATIENT)
Dept: CARDIOLOGY | Facility: CLINIC | Age: 89
End: 2025-03-28
Payer: MEDICARE

## 2025-03-28 VITALS
WEIGHT: 187 LBS | DIASTOLIC BLOOD PRESSURE: 70 MMHG | SYSTOLIC BLOOD PRESSURE: 118 MMHG | HEIGHT: 69 IN | BODY MASS INDEX: 27.7 KG/M2 | HEART RATE: 75 BPM

## 2025-03-28 DIAGNOSIS — I10 PRIMARY HYPERTENSION: ICD-10-CM

## 2025-03-28 DIAGNOSIS — R73.9 HYPERGLYCEMIA: ICD-10-CM

## 2025-03-28 DIAGNOSIS — E78.2 MIXED HYPERLIPIDEMIA: ICD-10-CM

## 2025-03-28 DIAGNOSIS — G47.33 OBSTRUCTIVE SLEEP APNEA, ADULT: ICD-10-CM

## 2025-03-28 DIAGNOSIS — R94.31 ABNORMAL EKG: ICD-10-CM

## 2025-03-28 DIAGNOSIS — I47.10 PSVT (PAROXYSMAL SUPRAVENTRICULAR TACHYCARDIA) (CMS-HCC): ICD-10-CM

## 2025-03-28 DIAGNOSIS — I48.0 PAROXYSMAL ATRIAL FIBRILLATION (MULTI): ICD-10-CM

## 2025-03-28 DIAGNOSIS — Z79.899 HIGH RISK MEDICATION USE: ICD-10-CM

## 2025-03-28 DIAGNOSIS — R06.83 SNORING: ICD-10-CM

## 2025-03-28 DIAGNOSIS — I49.5 SSS (SICK SINUS SYNDROME) (MULTI): ICD-10-CM

## 2025-03-28 DIAGNOSIS — R00.1 BRADYCARDIA: ICD-10-CM

## 2025-03-28 DIAGNOSIS — I36.1 NONRHEUMATIC TRICUSPID VALVE REGURGITATION: ICD-10-CM

## 2025-03-28 DIAGNOSIS — I50.31 ACUTE DIASTOLIC HEART FAILURE: ICD-10-CM

## 2025-03-28 DIAGNOSIS — R06.09 DOE (DYSPNEA ON EXERTION): ICD-10-CM

## 2025-03-28 DIAGNOSIS — F03.90 DEMENTIA, UNSPECIFIED DEMENTIA SEVERITY, UNSPECIFIED DEMENTIA TYPE, UNSPECIFIED WHETHER BEHAVIORAL, PSYCHOTIC, OR MOOD DISTURBANCE OR ANXIETY (MULTI): Primary | ICD-10-CM

## 2025-03-28 DIAGNOSIS — G47.33 OSA ON CPAP: ICD-10-CM

## 2025-03-28 DIAGNOSIS — Z79.01 CURRENT USE OF LONG TERM ANTICOAGULATION: ICD-10-CM

## 2025-03-28 DIAGNOSIS — R55 NEAR SYNCOPE: ICD-10-CM

## 2025-03-28 DIAGNOSIS — I27.20 PULMONARY HYPERTENSION (MULTI): ICD-10-CM

## 2025-03-28 DIAGNOSIS — I25.10 CORONARY ARTERY DISEASE INVOLVING NATIVE CORONARY ARTERY OF NATIVE HEART WITHOUT ANGINA PECTORIS: ICD-10-CM

## 2025-03-28 DIAGNOSIS — R60.0 LOCALIZED EDEMA: ICD-10-CM

## 2025-03-28 PROCEDURE — 93000 ELECTROCARDIOGRAM COMPLETE: CPT | Performed by: INTERNAL MEDICINE

## 2025-03-28 PROCEDURE — 3074F SYST BP LT 130 MM HG: CPT | Performed by: INTERNAL MEDICINE

## 2025-03-28 PROCEDURE — 1036F TOBACCO NON-USER: CPT | Performed by: INTERNAL MEDICINE

## 2025-03-28 PROCEDURE — 1159F MED LIST DOCD IN RCRD: CPT | Performed by: INTERNAL MEDICINE

## 2025-03-28 PROCEDURE — 99214 OFFICE O/P EST MOD 30 MIN: CPT | Performed by: INTERNAL MEDICINE

## 2025-03-28 PROCEDURE — 3078F DIAST BP <80 MM HG: CPT | Performed by: INTERNAL MEDICINE

## 2025-03-28 RX ORDER — PRAVASTATIN SODIUM 40 MG/1
40 TABLET ORAL NIGHTLY
Qty: 90 TABLET | Refills: 3 | Status: SHIPPED | OUTPATIENT
Start: 2025-03-28 | End: 2026-03-28

## 2025-03-28 RX ORDER — BUPROPION HYDROCHLORIDE 150 MG/1
1 TABLET ORAL DAILY
COMMUNITY
Start: 2025-03-24 | End: 2025-04-23

## 2025-03-28 RX ORDER — AMLODIPINE BESYLATE 10 MG/1
10 TABLET ORAL DAILY
Qty: 90 TABLET | Refills: 3 | Status: SHIPPED | OUTPATIENT
Start: 2025-03-28 | End: 2026-03-28

## 2025-03-28 RX ORDER — DIGOXIN 125 MCG
125 TABLET ORAL 3 TIMES WEEKLY
Qty: 36 TABLET | Refills: 3 | Status: SHIPPED | OUTPATIENT
Start: 2025-03-28 | End: 2026-03-28

## 2025-03-28 RX ORDER — FUROSEMIDE 20 MG/1
20 TABLET ORAL 3 TIMES WEEKLY
Qty: 36 TABLET | Refills: 3 | Status: SHIPPED | OUTPATIENT
Start: 2025-03-28 | End: 2026-03-28

## 2025-03-28 RX ORDER — LOSARTAN POTASSIUM AND HYDROCHLOROTHIAZIDE 12.5; 5 MG/1; MG/1
1 TABLET ORAL DAILY
Qty: 90 TABLET | Refills: 3 | Status: SHIPPED | OUTPATIENT
Start: 2025-03-28 | End: 2026-03-28

## 2025-03-28 RX ORDER — POTASSIUM CHLORIDE 20 MEQ/1
20 TABLET, EXTENDED RELEASE ORAL 3 TIMES WEEKLY
Qty: 36 TABLET | Refills: 3 | Status: SHIPPED | OUTPATIENT
Start: 2025-03-28 | End: 2026-03-28

## 2025-03-28 NOTE — PATIENT INSTRUCTIONS
6 month follow up appointment   Please have Fasting Labs done 1 week before your next cardiology appointment     Dr. Ramsey would like you to watch your diet, exercise and hydrate at least 64 oz of fluid a day    DID YOU KNOW  We have a pharmacy here in the [facility].  They can fill all prescriptions, not just cardiac medications.  Prescriptions from other pharmacies can easily be transferred to the  pharmacy by the  pharmacist on site.   pharmacies offer FREE HOME DELIVERY on medications to anywhere in [place]. They can sync your medications. Typically prescriptions can be ready in 10 - 15 minutes. If pharmacy is unable to fill your  prescription or if cost is more than your paying now the Pharmacist can easily transfer back to your Pharmacy of choice. Pharmacy phone # [phone].     Please bring all medicines, vitamins, and herbal supplements with you in original bottles to every appointment  Prescriptions will not be filled unless you are compliant with your follow up appointments or have a follow up appointment scheduled as per instruction of your physician. Refills should be requested at the time of your visit.

## 2025-03-28 NOTE — PROGRESS NOTES
CARDIOLOGY OFFICE NOTE     Date:   3/28/2025    Patient:    Issa Webb    YOB: 1935    Primary Physician: Octaviano Velasquez DO         REASON FOR VISIT / CHIEF COMPLAINT:     Cardiology 6-month follow-up.    HPI:     Issa Webb was seen in cardiac evaluation at the [facility] Cardiology office March 28, 2025.      The patients problems are listed as in the impression below.    Electronic medical records reviewed.    Patient returns.  He has atrial fibrillation rate controlled on warfarin.  He is doing well overall he sees Dr. Octaviano Velasquez for his general care    Overall he is feeling well from a cardiovascular standpoint.  He has no new complaints.    He is using his CPAP night.  His wife states that states that he does have night sweats.  He has had no infectious illnesses noted.  Recent UA however notes of protein and RBCs in his urine he has a follow-up with his primary care physician.    Patient denies Chest Pain, SOB, Lightheadedness, Dizziness, TIA or CVA symptoms.  No CHF or Edema.  No Palpitations.  No GI,  or Bleeding Issues. No Recent Fever or Chills.     Cardiovascular and general review of systems is otherwise negative.    A 14-system review is otherwise negative, other than noted.     PHYSICAL EXAMINATION:      Vitals:    03/28/25 1019   BP: 118/70   Pulse: 75     General: No acute distress.  Alert and oriented.  Head And Neck Examination: No jugular venous distention, no carotid bruits, no mass. Carotid upstrokes preserved. Oral mucosa moist.  No xanthelasma. Head and neck examination otherwise unremarkable.  Lungs: Clear to auscultation and percussion. No wheezes, no rales,  and no rhonchi.  Chest: Excursion appeared to be normal. No chest wall tenderness on palpation.  Heart: Normal S1 and S2. No S3. No S4. No rub. Grade 1/6 systolic murmur, best heard at the left sternal border. Point of maximal impulse was within normal limits.  Abdomen: Soft. Nontender. No  organomegaly. No bruits. No masses.   Extremities: No bipedal edema. No clubbing. No cyanosis.  Pulses are strong throughout. No bruits.  Musculoskeletal Exam: No ulcers, otherwise unremarkable.  Neuro: Neurologically appeared grossly intact.     IMPRESSION:       Cardiovascular status stable  Near syncope with seizure-like symptomatology, no recurrence, no recurrence.  Symptomatic bradycardia, rates in the 30s during daytime, improved with decreased of medication.  Shortness of breath, improved  Palpitations, improved  Chronic atrial fibrillation, rate controlled  Sick sinus syndrome with greater than 2 second pauses, Holter monitor 6/2024 with chronic atrial fibrillation but no significant pauses.  Long-term anticoagulation, warfarin  High risk medications, digoxin  Abnormal EKG  Abnormal 48-hour Holter monitor, see below  Congestive heart failure, diastolic, acute on chronic  Coronary artery disease with mild CAD by catheterization 2008, negative CTA past.  Elevated calcium score, 29  Preserved left ventricular systolic function, LVEF 60 to 65%, echocardiogram 1/2024  LV diastolic dysfunction  Premature ventricular contractions  Hypertension  Hyperlipidemia  Hyperglycemia  Pulmonary hypertension  Tricuspid regurgitation  Obstructive sleep apnea on CPAP treatment  Memory issues.  Never smoked  Family history of coronary artery disease  Otherwise as per assessment below.    RECOMMENDATIONS:      Patient from cardiovascular standpoint appears to be doing well overall.  He has controlled atrial fibrillation.  Would suggest continuing his current medications including warfarin.  Refills were provided.    He does have some hematuria for which he will follow-up with his primary care physician.    Exercise dietary program.    Hydration.    TIMPIK portal use was encouraged.    We will plan to see back in 6 months with Laboratory Studies and ECG as ordered.     Patient will follow up with their primary physician for  general care.    The patient knows to contact medical care earlier if need be.      ALLERGIES:     Allergies   Allergen Reactions    Penicillins Swelling    Sulfa (Sulfonamide Antibiotics) Swelling        MEDICATIONS:     Current Outpatient Medications   Medication Instructions    amLODIPine (NORVASC) 10 mg, oral, Daily    aspirin 81 mg EC tablet 1 tablet, Daily    buPROPion XL (Wellbutrin XL) 150 mg 24 hr tablet 1 capsule, Daily    cholecalciferol (Vitamin D-3) 125 MCG (5000 UT) capsule 1 capsule, Daily    digoxin (LANOXIN) 125 mcg, oral, 3 times weekly    fluticasone (Flonase) 50 mcg/actuation nasal spray 1 spray, Daily PRN    furosemide (LASIX) 20 mg, oral, 3 times weekly    losartan-hydrochlorothiazide (Hyzaar) 50-12.5 mg tablet 1 tablet, oral, Daily    magnesium 250 mg tablet 1 tablet, Daily    potassium chloride CR 20 mEq ER tablet 20 mEq, oral, 3 times weekly    pravastatin (PRAVACHOL) 40 mg, oral, Nightly    tamsulosin (Flomax) 0.4 mg 24 hr capsule 1 capsule, Daily    warfarin (COUMADIN) 5 mg       ELECTROCARDIOGRAM:      Atrial fibrillation, poor R wave anterior progression.  Decreased voltage.  Rate 75.    CARDIAC TESTING:      None this visit    LABORATORY DATA:        3/2025:  Chem-7, CBC, TSH, PSA normal except for glucose 116.  Urine analysis with RBCs and protein noted.        PROBLEM LIST:     Patient Active Problem List   Diagnosis    CAD (coronary artery disease)    Dry eye syndrome, bilateral    History of retinal vein occlusion    History of YAG laser capsulotomy of lens of left eye    Hyperlipidemia    Hyperopia with astigmatism and presbyopia    Hypertension    Hypoxia    Impacted cerumen    Lumbago    Obstructive sleep apnea, adult    DANE on CPAP    Overweight (BMI 25.0-29.9)    Paroxysmal atrial fibrillation (Multi)    Permanent atrial fibrillation (Multi)    Posterior capsular opacification visually significant of left eye    Pseudophakia of both eyes    Posterior vitreous detachment of both  eyes    PSVT (paroxysmal supraventricular tachycardia) (CMS-HCC)    Pulmonary hypertension (Multi)    RPE mottling of macula    Sensory hearing loss, bilateral    Sinusitis, chronic    Cough    Snoring    Tinnitus    Tricuspid regurgitation    Vertical strabismus of left eye    Acute diastolic heart failure    Localized edema    Hyperglycemia    High risk medication use    MAYNARD (dyspnea on exertion)    Current use of long term anticoagulation    Near syncope    SSS (sick sinus syndrome) (Multi)    Bradycardia    Abnormal EKG             Christopher Ramsey MD, FACC   [facility] /  Cardiology      Of Note:  [employer] voice recognition dictation software was utilized partially in the preparation of this note, therefore, inaccuracies in spelling, word choice and punctuation may have occurred which were not recognized at the time of signing.    Patient was seen and examined with total time of visit including chart preparation, rooming, and chart completion exceeding 40 minutes.      I, GIOVANNY RIBERA RN am scribing for, and in the presence of Dr. Christopher Ramsey MD, FACC.    I, Dr. Christopher Ramsey MD, FACC, personally performed the services described in the documentation as scribed by GIOVANNY RIBERA RN in my presence, and confirm it is both accurate and complete.

## 2025-04-01 ENCOUNTER — HOSPITAL ENCOUNTER (OUTPATIENT)
Dept: CT IMAGING | Age: 89
Discharge: HOME | End: 2025-04-03
Attending: INTERNAL MEDICINE
Payer: MEDICARE

## 2025-04-01 DIAGNOSIS — F03.90 DEMENTIA, UNSPECIFIED DEMENTIA SEVERITY, UNSPECIFIED DEMENTIA TYPE, UNSPECIFIED WHETHER BEHAVIORAL, PSYCHOTIC, OR MOOD DISTURBANCE OR ANXIETY (HCC): ICD-10-CM

## 2025-04-01 PROCEDURE — 70450 CT HEAD/BRAIN W/O DYE: CPT

## 2025-04-02 ENCOUNTER — ANTI-COAG VISIT (OUTPATIENT)
Age: 89
End: 2025-04-02
Payer: MEDICARE

## 2025-04-02 DIAGNOSIS — I48.0 PAROXYSMAL ATRIAL FIBRILLATION (HCC): Primary | ICD-10-CM

## 2025-04-02 LAB
INTERNATIONAL NORMALIZATION RATIO, POC: 2.6
PROTHROMBIN TIME, POC: 0

## 2025-04-02 PROCEDURE — 99211 OFF/OP EST MAY X REQ PHY/QHP: CPT

## 2025-04-02 PROCEDURE — 85610 PROTHROMBIN TIME: CPT

## 2025-04-02 NOTE — PROGRESS NOTES
Mr. lAex Carlson is a 89 y.o. y/o male with history of Afib who presents today for anticoagulation monitoring and adjustment.  INR 2.6 is therapeutic for this patient (goal range 2-3) and is reflective of 40 mg Total Weekly Dose  Patient verifies current dosing regimen, patient able to verbally recall dose  Patient reports 0  missed doses since last INR   Patient denies s/sx clotting and/or stroke  Patient denies hematuria, epistaxis, rectal bleeding  Patient denies changes in diet, alcohol, or tobacco use  Reviewed medication list and drug allergies with patient, updated any medication additions or modifications accordingly  Patient also denies any pending medical or dental procedures scheduled at this time  Patient was instructed to continue 40 mg TWD and Return To Clinic in 4 weeks  For Pharmacy Admin Tracking Only    Intervention Detail: Adherence Monitorin  Total # of Interventions Recommended: 1  Total # of Interventions Accepted: 1  Time Spent (min): 15

## 2025-04-30 ENCOUNTER — ANTI-COAG VISIT (OUTPATIENT)
Age: 89
End: 2025-04-30
Payer: MEDICARE

## 2025-04-30 DIAGNOSIS — I48.0 PAROXYSMAL ATRIAL FIBRILLATION (HCC): Primary | ICD-10-CM

## 2025-04-30 LAB
INTERNATIONAL NORMALIZATION RATIO, POC: 2.1
PROTHROMBIN TIME, POC: 0

## 2025-04-30 PROCEDURE — 85610 PROTHROMBIN TIME: CPT

## 2025-04-30 PROCEDURE — 99211 OFF/OP EST MAY X REQ PHY/QHP: CPT

## 2025-04-30 NOTE — PROGRESS NOTES
Mr. Alex Carlson is a 89 y.o. y/o male with history of Afib who presents today for anticoagulation monitoring and adjustment.  INR 2.1 is therapeutic for this patient (goal range 2-3) and is reflective of 40 mg Total Weekly Dose  Patient verifies current dosing regimen, patient able to verbally recall dose  Patient reports 0  missed doses since last INR   Patient denies s/sx clotting and/or stroke  Patient denies hematuria, epistaxis, rectal bleeding  Patient denies changes in diet, alcohol, or tobacco use  Reviewed medication list and drug allergies with patient, updated any medication additions or modifications accordingly  Patient also denies any pending medical or dental procedures scheduled at this time  Patient was instructed to continue 40 mg TWD and Return To Clinic in 4 weeks

## 2025-05-28 ENCOUNTER — ANTI-COAG VISIT (OUTPATIENT)
Age: 89
End: 2025-05-28
Payer: MEDICARE

## 2025-05-28 DIAGNOSIS — I48.0 PAROXYSMAL ATRIAL FIBRILLATION (HCC): Primary | ICD-10-CM

## 2025-05-28 LAB
INTERNATIONAL NORMALIZATION RATIO, POC: 2.3
PROTHROMBIN TIME, POC: 0

## 2025-05-28 PROCEDURE — 85610 PROTHROMBIN TIME: CPT

## 2025-05-28 PROCEDURE — 99211 OFF/OP EST MAY X REQ PHY/QHP: CPT

## 2025-05-28 RX ORDER — WARFARIN SODIUM 5 MG/1
TABLET ORAL
Qty: 120 TABLET | Refills: 1 | Status: SHIPPED | OUTPATIENT
Start: 2025-05-28

## 2025-05-28 NOTE — PROGRESS NOTES
Mr. Alex Carlson is a 89 y.o. y/o male with history of Afib who presents today for anticoagulation monitoring and adjustment.  INR 2.3 is therapeutic for this patient (goal range 2-3) and is reflective of 40 mg Total Weekly Dose  Patient verifies current dosing regimen, patient able to verbally recall dose  Patient reports 0  missed doses since last INR   Patient denies s/sx clotting and/or stroke  Patient denies hematuria, epistaxis, rectal bleeding  Patient denies changes in diet, alcohol, or tobacco use  Reviewed medication list and drug allergies with patient, updated any medication additions or modifications accordingly  Patient also denies any pending medical or dental procedures scheduled at this time  Patient was instructed to continue 40 mg TWD and Return To Clinic in 4 weeks  For Pharmacy Admin Tracking Only    Intervention Detail: Adherence Monitorin  Total # of Interventions Recommended: 1  Total # of Interventions Accepted: 1  Time Spent (min): 15

## 2025-06-25 ENCOUNTER — ANTI-COAG VISIT (OUTPATIENT)
Age: 89
End: 2025-06-25
Payer: MEDICARE

## 2025-06-25 DIAGNOSIS — I48.91 ATRIAL FIBRILLATION, UNSPECIFIED TYPE (HCC): Primary | ICD-10-CM

## 2025-06-25 LAB
INTERNATIONAL NORMALIZATION RATIO, POC: 2.3
PROTHROMBIN TIME, POC: 0

## 2025-06-25 PROCEDURE — 85610 PROTHROMBIN TIME: CPT

## 2025-06-25 PROCEDURE — 99211 OFF/OP EST MAY X REQ PHY/QHP: CPT

## 2025-06-25 NOTE — PROGRESS NOTES
Mr. Alex Carlson is a 89 y.o. y/o male with history of Afib who presents today for anticoagulation monitoring and adjustment.  INR 2.3 is therapeutic for this patient (goal range 2-3) and is reflective of 40 mg Total Weekly Dose  Patient verifies current dosing regimen, patient able to verbally recall dose  Patient reports 0  missed doses since last INR   Patient denies s/sx clotting and/or stroke  Patient denies hematuria, epistaxis, rectal bleeding  Patient denies changes in diet, alcohol, or tobacco use  Reviewed medication list and drug allergies with patient, updated any medication additions or modifications accordingly  Patient also denies any pending medical or dental procedures scheduled at this time  Patient was instructed to continue 40 mg TWD and Return To Clinic 5 weeks  For Pharmacy Admin Tracking Only    Intervention Detail: Adherence Monitorin  Total # of Interventions Recommended: 1  Total # of Interventions Accepted: 1  Time Spent (min): 15

## 2025-07-02 ENCOUNTER — OFFICE VISIT (OUTPATIENT)
Dept: PULMONOLOGY | Age: 89
End: 2025-07-02
Payer: MEDICARE

## 2025-07-02 VITALS
SYSTOLIC BLOOD PRESSURE: 110 MMHG | WEIGHT: 190 LBS | OXYGEN SATURATION: 97 % | HEART RATE: 94 BPM | BODY MASS INDEX: 27.26 KG/M2 | DIASTOLIC BLOOD PRESSURE: 64 MMHG

## 2025-07-02 DIAGNOSIS — I48.20 CHRONIC ATRIAL FIBRILLATION (HCC): ICD-10-CM

## 2025-07-02 DIAGNOSIS — R09.81 NASAL CONGESTION: ICD-10-CM

## 2025-07-02 DIAGNOSIS — G47.33 OSA ON CPAP: Primary | ICD-10-CM

## 2025-07-02 PROCEDURE — 1159F MED LIST DOCD IN RCRD: CPT | Performed by: INTERNAL MEDICINE

## 2025-07-02 PROCEDURE — 99213 OFFICE O/P EST LOW 20 MIN: CPT | Performed by: INTERNAL MEDICINE

## 2025-07-02 PROCEDURE — 1123F ACP DISCUSS/DSCN MKR DOCD: CPT | Performed by: INTERNAL MEDICINE

## 2025-07-02 RX ORDER — BUPROPION HYDROCHLORIDE 150 MG/1
150 TABLET ORAL DAILY
COMMUNITY

## 2025-07-02 ASSESSMENT — ENCOUNTER SYMPTOMS
COUGH: 0
WHEEZING: 0
EYE ITCHING: 0
VOMITING: 0
CHEST TIGHTNESS: 0
ABDOMINAL PAIN: 0
SORE THROAT: 0
SHORTNESS OF BREATH: 0
DIARRHEA: 0
NAUSEA: 0
VOICE CHANGE: 0
RHINORRHEA: 1

## 2025-07-02 NOTE — PROGRESS NOTES
Subjective:             Alex Carlson is a 89 y.o. male who complains today of:     Chief Complaint   Patient presents with    Follow-up     4m f/u on OSVALDO       HPI  He is using CPAP  with 7-10 cm.for about 2-4 hours , he is not using every night.   He is using CPAP with  nasal  Mask. patient has large air leak   C/o nasal congestion and postnasal drip+.   He said  sleep is restful with the CPAP use.  He is not compliant with CPAP therapy and benefiting with CPAP use.   No snoring with CPAP use.  No complaint of daytime sleepiness or tiredness with CPAP use. He denies taking naps.  No sleepiness with driving. He denies difficulty falling asleep or staying asleep.     I reviewed compliance report with patient regarding CPAP therapy. He is using  CPAP for 18 days out of 30 days. Average usage of days used is 2 hours and 7 min , average AHI 10.1 with CPAP use.     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    Tinnitus 06/28/2010     Past Surgical History:   Procedure Laterality Date    CATARACT REMOVAL  2011     Family History   Problem Relation Age of Onset    Heart Disease Father      Social History     Socioeconomic History    Marital status:      Spouse name: Not on file    Number of children: Not on file    Years of education: Not on file    Highest education level: Not on file   Occupational History    Not on file   Tobacco Use    Smoking status: Never    Smokeless tobacco: Never   Vaping Use    Vaping status: Never Used   Substance and Sexual Activity    Alcohol use: Not on file    Drug use: Not on file    Sexual activity: Not on file   Other Topics Concern    Not on file   Social History Narrative    Not on file     Social Drivers of Health     Financial Resource Strain: Low Risk  (3/7/2023)    Overall Financial Resource Strain ([facility])

## 2025-07-30 ENCOUNTER — ANTI-COAG VISIT (OUTPATIENT)
Age: 89
End: 2025-07-30
Payer: MEDICARE

## 2025-07-30 DIAGNOSIS — I48.0 PAROXYSMAL ATRIAL FIBRILLATION (HCC): Primary | ICD-10-CM

## 2025-07-30 LAB
INTERNATIONAL NORMALIZATION RATIO, POC: 2.6
PROTHROMBIN TIME, POC: 0

## 2025-07-30 PROCEDURE — 99211 OFF/OP EST MAY X REQ PHY/QHP: CPT

## 2025-07-30 PROCEDURE — 85610 PROTHROMBIN TIME: CPT

## 2025-07-30 NOTE — PROGRESS NOTES
Mr. Alex Carlson is a 89 y.o. y/o male with history of Afib who presents today for anticoagulation monitoring and adjustment.  INR 2.6 is therapeutic for this patient (goal range 2-3) and is reflective of 40 mg Total Weekly Dose  Patient verifies current dosing regimen, patient able to verbally recall dose  Patient reports 0  missed doses since last INR   Patient denies s/sx clotting and/or stroke  Patient denies hematuria, epistaxis, rectal bleeding  Patient denies changes in diet, alcohol, or tobacco use  Reviewed medication list and drug allergies with patient, updated any medication additions or modifications accordingly  Patient also denies any pending medical or dental procedures scheduled at this time  Patient was instructed to continue 40 mg TWD and Return To Clinic in 6 weeks  For Pharmacy Admin Tracking Only    Intervention Detail: Adherence Monitorin  Total # of Interventions Recommended: 1  Total # of Interventions Accepted: 1  Time Spent (min): 15

## 2025-10-03 ENCOUNTER — APPOINTMENT (OUTPATIENT)
Dept: CARDIOLOGY | Facility: CLINIC | Age: 89
End: 2025-10-03
Payer: MEDICARE

## 2025-12-08 ENCOUNTER — APPOINTMENT (OUTPATIENT)
Dept: CARDIOLOGY | Facility: CLINIC | Age: 89
End: 2025-12-08
Payer: MEDICARE